# Patient Record
Sex: MALE | Race: WHITE | Employment: OTHER | URBAN - NONMETROPOLITAN AREA
[De-identification: names, ages, dates, MRNs, and addresses within clinical notes are randomized per-mention and may not be internally consistent; named-entity substitution may affect disease eponyms.]

---

## 2017-11-14 ENCOUNTER — ANCILLARY PROCEDURE (OUTPATIENT)
Dept: RADIOLOGY | Facility: CLINIC | Age: 67
End: 2017-11-14
Payer: COMMERCIAL

## 2017-11-14 PROCEDURE — 73552 X-RAY EXAM OF FEMUR 2/>: CPT | Mod: TC,LT | Performed by: PHYSICIAN ASSISTANT

## 2017-11-14 PROCEDURE — 73552 X-RAY EXAM OF FEMUR 2/>: CPT | Mod: 26,LT | Performed by: ORTHOPAEDIC SURGERY

## 2017-12-07 PROBLEM — S66.212D: Status: ACTIVE | Noted: 2017-12-07

## 2017-12-14 ENCOUNTER — APPOINTMENT (OUTPATIENT)
Dept: CARDIOLOGY | Facility: CLINIC | Age: 67
End: 2017-12-14
Payer: OTHER MISCELLANEOUS

## 2017-12-14 PROBLEM — I25.10 ATHEROSCLEROTIC HEART DISEASE OF NATIVE CORONARY ARTERY WITHOUT ANGINA PECTORIS: Status: ACTIVE | Noted: 2017-12-14

## 2017-12-14 PROCEDURE — 93000 ELECTROCARDIOGRAM COMPLETE: CPT | Performed by: FAMILY MEDICINE

## 2017-12-14 PROCEDURE — 85025 COMPLETE CBC W/AUTO DIFF WBC: CPT | Performed by: FAMILY MEDICINE

## 2017-12-14 PROCEDURE — 80053 COMPREHEN METABOLIC PANEL: CPT | Performed by: FAMILY MEDICINE

## 2017-12-26 ASSESSMENT — ACTIVITIES OF DAILY LIVING (ADL)
ASSISTIVE_DEVICES: EYEGLASSES;DENTURES PARTIAL
ADEQUATE_TO_COMPLETE_ADL: YES

## 2017-12-26 NOTE — PRE-PROCEDURE INSTRUCTIONS
Pre-Surgery Instructions:   Medication Instructions   • aspirin 81 mg EC tablet Stop taking 1 week prior to surgery/procedure   • atorvastatin (LIPITOR) 20 mg tablet Take morning of surgery/procedure   • metoprolol succinate XL (TOPROL-XL) 25 mg 24 hr tablet Take morning of surgery/procedure   • ramipril (ALTACE) 2.5 mg capsule Take morning of surgery/procedure   • UBIDECARENONE (COENZYME Q10 ORAL) Stop taking 1 week prior to surgery/procedure     Pre-op instructions and teaching completed with patient. Patient friend Frederick will accompany patient day of surgery. Patient instructed to take Lipitor, metoptolol, and Altace DOS. Will hold NSAIDS, vitamins, herbals, and any other blood thinning products until after surgery and md instruction, last dose to be 12/28/17. Reviewed orthopedic protocol and Alexis wipe instructions with patient. Call by surgery on arrival and NPO time 2-3 business days prior to procedure. Instructed to contact surgeon office if any illness or infection occurs and with any additional questions or concerns. Patient verbalizes understanding.

## 2018-01-02 ENCOUNTER — ANESTHESIA EVENT (OUTPATIENT)
Dept: GASTROENTEROLOGY | Facility: HOSPITAL | Age: 68
End: 2018-01-02
Payer: COMMERCIAL

## 2018-01-02 ENCOUNTER — HOSPITAL ENCOUNTER (OUTPATIENT)
Dept: PHYSICAL THERAPY | Facility: HOSPITAL | Age: 68
Setting detail: THERAPIES SERIES
Discharge: HOME | End: 2018-01-02
Payer: COMMERCIAL

## 2018-01-02 DIAGNOSIS — Z48.89 ENCOUNTER FOR OTHER SPECIFIED SURGICAL AFTERCARE (CODE): Primary | ICD-10-CM

## 2018-01-02 PROCEDURE — 97110 THERAPEUTIC EXERCISES: CPT | Performed by: PHYSICAL THERAPIST

## 2018-01-02 PROCEDURE — 97116 GAIT TRAINING THERAPY: CPT | Performed by: PHYSICAL THERAPIST

## 2018-01-02 NOTE — ANESTHESIA PREPROCEDURE EVALUATION
"Pre-Procedure Assessment    Patient: Juan Corona, male, 67 y.o.    Ht Readings from Last 1 Encounters:   12/14/17 1.676 m (5' 6\")     Wt Readings from Last 1 Encounters:   12/14/17 80.1 kg (176 lb 8 oz)       Last Vitals  BP      Temp      Pulse     Resp      SpO2         Problem list reviewed and Medical history reviewed    History of anesthetic complications: PONV         Airway   Mallampati: II  TM distance: >3 FB  Neck ROM: full      Dental    (+) lower dentures and partials    Pulmonary     breath sounds clear to auscultation  Cardiovascular   Exercise tolerance: good (4-7 METS)  (+) hypertension well controlled, CAD, DVT    ECG reviewed  Rhythm: regular  Rate: normal  ROS comment: 12/14/17  . Sinus rhythm  . Abnormal R-wave progression, early transition  * Diffuse ST elevation    10/17 Stress Test  Negative, EF 60%    Mental Status/Neuro/Psych    Pt is alert.      (+) arthritis,     GI/Hepatic/Renal    (+) GERD poorly controlled,     Endo/Other    (+) history of blood transfusion,   Abdominal                    Hematology   Lab Results   Component Value Date/Time    WBC 5.5 12/14/2017 09:35 AM    RBC 5.14 12/14/2017 09:35 AM    MCV 85.6 12/14/2017 09:35 AM    HGB 14.8 12/14/2017 09:35 AM    HCT 44.0 12/14/2017 09:35 AM     12/14/2017 09:35 AM      Coagulation No results found for: PT, APTT, INR   General Chemistry   Lab Results   Component Value Date/Time    CALCIUM 9.7 12/14/2017 09:35 AM    BUN 17 12/14/2017 09:35 AM    CREATININE 0.6 (L) 12/14/2017 09:35 AM    GLUCOSE 97 12/14/2017 09:35 AM     12/14/2017 09:35 AM    K 4.4 12/14/2017 09:35 AM    CO2 28 12/14/2017 09:35 AM     12/14/2017 09:35 AM     Anesthesia Plan    ASA 3   NPO status reviewed: > 8 hours    General         Induction: intravenous   Airway Planning: LMA          Plan for postoperative opioid use.    Anesthetic plan and risks discussed with patient.  Use of blood products discussed with patient whom consented to blood " products.

## 2018-01-02 NOTE — INTERDISCIPLINARY/THERAPY
[facility] PT Daily Treatment Note      Patient Name: Juan Corona  Date of Service: 1/2/2018  Visit Number: Visit Number: 13      Subjective:   Oleg continues to c/o pain in the region of the L hip flexor during activities that require hip flexion AROM.    Pain:  Pain Assessment  Pain Assessment: 0-10  Pain Score: 4 (0/10 at rest, 4-6/10 with repetitive hp flexion)      Objective:  HIP PROM:  · Extension: Right = unable to achieve neutral position prior to Tx, able to reach neutral position after ther ex, Left = 5 deg    Treatment:  THER EX:  · Hip flexor stretch: pt lying supine with proximal 2/3 of Left thigh on table, and remainder of Left leg off table, and Right leg flexed with foot on table.  From here contract-relax stretching and prolonged low load stretching was applied to the Left hip flexor  · NK Table knee extension, 3 sets, each set weight and knee flexion ROM increased  GAIT TRAINING:  · Verbal and visual cueing to facilitate greater Left knee flexion, and inhibit excessive Left hip flexion/circumduction       Rehab Potential/Assessment/Plan:  Oleg continues to c/o anterior Left hip discomfort during activities involving hip flexion.  His lack of hip extension + lack of knee extension causes him to both compensate and over-use his Left hip flexor.  Today I modified his ther ex to improve hip flexor flexibility, knee flexion ROM, and overall gait quality.      Patient continues to benefit from skilled PT.    At next session: improve hip extension ROM/flexibility, improve knee flexion ROM, improve gait quality.             Thank you for allowing us to share in the care of this patient. Please be advised the order we received does not include a specified duration of treatment, therefore, this order will only be good for 30 days. If you have any questions, recommendations, or further concerns regarding this patient, please feel free to contact me at [phone].  Signed by: JOSSIE ORTIZ, PT   1/2/2018  10:25 AM

## 2018-01-05 ENCOUNTER — ANESTHESIA (OUTPATIENT)
Dept: GASTROENTEROLOGY | Facility: HOSPITAL | Age: 68
End: 2018-01-05
Payer: COMMERCIAL

## 2018-01-05 ENCOUNTER — HOSPITAL ENCOUNTER (OUTPATIENT)
Facility: HOSPITAL | Age: 68
Setting detail: OUTPATIENT SURGERY
Discharge: HOME | End: 2018-01-05
Attending: ORTHOPAEDIC SURGERY | Admitting: ORTHOPAEDIC SURGERY
Payer: COMMERCIAL

## 2018-01-05 VITALS
SYSTOLIC BLOOD PRESSURE: 123 MMHG | BODY MASS INDEX: 28.28 KG/M2 | RESPIRATION RATE: 14 BRPM | OXYGEN SATURATION: 93 % | HEIGHT: 66 IN | HEART RATE: 74 BPM | DIASTOLIC BLOOD PRESSURE: 78 MMHG | TEMPERATURE: 98 F | WEIGHT: 176 LBS

## 2018-01-05 DIAGNOSIS — Z48.89 ENCOUNTER FOR POSTOPERATIVE CARE: Primary | ICD-10-CM

## 2018-01-05 PROCEDURE — (BLANK) HC RECOVERY PHASE-2 1ST 1/2 HOUR ACUITY LEVEL 1: Performed by: ORTHOPAEDIC SURGERY

## 2018-01-05 PROCEDURE — 6360000200 HC RX 636 W HCPCS (ALT 250 FOR IP): Performed by: NURSE ANESTHETIST, CERTIFIED REGISTERED

## 2018-01-05 PROCEDURE — (BLANK) HC RECOVERY PHASE-2 EACH ADDITIONAL 1/2 HOUR ACUITY LEVEL 1: Performed by: ORTHOPAEDIC SURGERY

## 2018-01-05 PROCEDURE — 6360000200 HC RX 636 W HCPCS (ALT 250 FOR IP): Performed by: NURSE PRACTITIONER

## 2018-01-05 PROCEDURE — 6360000200 HC RX 636 W HCPCS (ALT 250 FOR IP)

## 2018-01-05 PROCEDURE — 2500000500 HC SEVOFLURANE (15 MINS): Performed by: ORTHOPAEDIC SURGERY

## 2018-01-05 PROCEDURE — (BLANK) HC RECOVERY PHASE-1 1ST  HOUR ACUITY LEVEL 2: Performed by: ORTHOPAEDIC SURGERY

## 2018-01-05 PROCEDURE — 01810 ANES PX NRV MUSC F/ARM WRST: CPT | Performed by: NURSE ANESTHETIST, CERTIFIED REGISTERED

## 2018-01-05 PROCEDURE — 26412 REPAIR/GRAFT HAND TENDON: CPT | Mod: FA | Performed by: ORTHOPAEDIC SURGERY

## 2018-01-05 PROCEDURE — 6370000100 HC RX 637 (ALT 250 FOR IP): Performed by: PHYSICIAN ASSISTANT

## 2018-01-05 PROCEDURE — 26412 REPAIR/GRAFT HAND TENDON: CPT | Mod: AS,FA | Performed by: PHYSICIAN ASSISTANT

## 2018-01-05 PROCEDURE — 6370000100 HC RX 637 (ALT 250 FOR IP): Performed by: NURSE ANESTHETIST, CERTIFIED REGISTERED

## 2018-01-05 PROCEDURE — (BLANK) HC OR LEVEL 2 PROC EACH ADDITIONAL MIN: Performed by: ORTHOPAEDIC SURGERY

## 2018-01-05 PROCEDURE — 2580000300 HC RX 258: Performed by: NURSE PRACTITIONER

## 2018-01-05 PROCEDURE — (BLANK) HC OR LEVEL 2 PROC 1ST 15MIN: Performed by: ORTHOPAEDIC SURGERY

## 2018-01-05 PROCEDURE — 2500000200 HC RX 250 WO HCPCS: Performed by: ORTHOPAEDIC SURGERY

## 2018-01-05 RX ORDER — DIPHENHYDRAMINE HCL 25 MG
25 CAPSULE ORAL EVERY 4 HOURS PRN
Status: DISCONTINUED | OUTPATIENT
End: 2018-01-05 | Stop reason: HOSPADM

## 2018-01-05 RX ORDER — MORPHINE SULFATE 2 MG/ML
1-2 INJECTION, SOLUTION INTRAMUSCULAR; INTRAVENOUS
Status: DISCONTINUED | OUTPATIENT
Start: 2018-01-05 | End: 2018-01-05 | Stop reason: HOSPADM

## 2018-01-05 RX ORDER — MIDAZOLAM HYDROCHLORIDE 1 MG/ML
INJECTION, SOLUTION INTRAMUSCULAR; INTRAVENOUS AS NEEDED
Status: DISCONTINUED | OUTPATIENT
Start: 2018-01-05 | End: 2018-01-05 | Stop reason: SURG

## 2018-01-05 RX ORDER — FENTANYL CITRATE/PF 100MCG/2ML
SYRINGE (ML) INTRAVENOUS AS NEEDED
Status: DISCONTINUED | OUTPATIENT
Start: 2018-01-05 | End: 2018-01-05 | Stop reason: SURG

## 2018-01-05 RX ORDER — LIDOCAINE HYDROCHLORIDE 20 MG/ML
INJECTION, SOLUTION EPIDURAL; INFILTRATION; INTRACAUDAL; PERINEURAL AS NEEDED
Status: DISCONTINUED | OUTPATIENT
Start: 2018-01-05 | End: 2018-01-05 | Stop reason: SURG

## 2018-01-05 RX ORDER — BUPIVACAINE HYDROCHLORIDE 2.5 MG/ML
INJECTION, SOLUTION EPIDURAL; INFILTRATION; INTRACAUDAL AS NEEDED
Status: DISCONTINUED | OUTPATIENT
Start: 2018-01-05 | End: 2018-01-05 | Stop reason: HOSPADM

## 2018-01-05 RX ORDER — OXYCODONE AND ACETAMINOPHEN 5; 325 MG/1; MG/1
2 TABLET ORAL EVERY 4 HOURS PRN
Status: DISCONTINUED | OUTPATIENT
Start: 2018-01-05 | End: 2018-01-05 | Stop reason: HOSPADM

## 2018-01-05 RX ORDER — SODIUM CHLORIDE, SODIUM LACTATE, POTASSIUM CHLORIDE, CALCIUM CHLORIDE 600; 310; 30; 20 MG/100ML; MG/100ML; MG/100ML; MG/100ML
100 INJECTION, SOLUTION INTRAVENOUS CONTINUOUS
Status: DISCONTINUED | OUTPATIENT
Start: 2018-01-05 | End: 2018-01-05 | Stop reason: HOSPADM

## 2018-01-05 RX ORDER — FENTANYL CITRATE 50 UG/ML
50 INJECTION, SOLUTION INTRAMUSCULAR; INTRAVENOUS EVERY 5 MIN PRN
Status: CANCELLED | OUTPATIENT
Start: 2018-01-05

## 2018-01-05 RX ORDER — DIPHENHYDRAMINE HYDROCHLORIDE 50 MG/ML
25 INJECTION INTRAMUSCULAR; INTRAVENOUS ONCE AS NEEDED
Status: CANCELLED | OUTPATIENT
Start: 2018-01-05

## 2018-01-05 RX ORDER — ONDANSETRON 4 MG/1
4 TABLET, FILM COATED ORAL EVERY 6 HOURS PRN
Status: DISCONTINUED | OUTPATIENT
End: 2018-01-05 | Stop reason: HOSPADM

## 2018-01-05 RX ORDER — DEXAMETHASONE SODIUM PHOSPHATE 4 MG/ML
INJECTION, SOLUTION INTRA-ARTICULAR; INTRALESIONAL; INTRAMUSCULAR; INTRAVENOUS; SOFT TISSUE AS NEEDED
Status: DISCONTINUED | OUTPATIENT
Start: 2018-01-05 | End: 2018-01-05 | Stop reason: SURG

## 2018-01-05 RX ORDER — ONDANSETRON 4 MG/1
4 TABLET, ORALLY DISINTEGRATING ORAL EVERY 6 HOURS PRN
Status: DISCONTINUED | OUTPATIENT
End: 2018-01-05 | Stop reason: HOSPADM

## 2018-01-05 RX ORDER — ONDANSETRON HYDROCHLORIDE 2 MG/ML
INJECTION, SOLUTION INTRAVENOUS AS NEEDED
Status: DISCONTINUED | OUTPATIENT
Start: 2018-01-05 | End: 2018-01-05 | Stop reason: SURG

## 2018-01-05 RX ORDER — ONDANSETRON HYDROCHLORIDE 2 MG/ML
4 INJECTION, SOLUTION INTRAVENOUS EVERY 6 HOURS PRN
Status: DISCONTINUED | OUTPATIENT
End: 2018-01-05 | Stop reason: HOSPADM

## 2018-01-05 RX ORDER — ONDANSETRON HYDROCHLORIDE 2 MG/ML
4 INJECTION, SOLUTION INTRAVENOUS ONCE AS NEEDED
Status: CANCELLED | OUTPATIENT
Start: 2018-01-05

## 2018-01-05 RX ORDER — DIPHENHYDRAMINE HYDROCHLORIDE 50 MG/ML
25 INJECTION INTRAMUSCULAR; INTRAVENOUS EVERY 4 HOURS PRN
Status: DISCONTINUED | OUTPATIENT
End: 2018-01-05 | Stop reason: HOSPADM

## 2018-01-05 RX ORDER — HYDROMORPHONE HYDROCHLORIDE 1 MG/ML
0.5 INJECTION, SOLUTION INTRAMUSCULAR; INTRAVENOUS; SUBCUTANEOUS EVERY 5 MIN PRN
Status: CANCELLED | OUTPATIENT
Start: 2018-01-05

## 2018-01-05 RX ORDER — PROPOFOL 10 MG/ML
INJECTION, EMULSION INTRAVENOUS AS NEEDED
Status: DISCONTINUED | OUTPATIENT
Start: 2018-01-05 | End: 2018-01-05 | Stop reason: SURG

## 2018-01-05 RX ORDER — OXYCODONE AND ACETAMINOPHEN 5; 325 MG/1; MG/1
1-2 TABLET ORAL EVERY 4 HOURS PRN
Status: DISCONTINUED | OUTPATIENT
Start: 2018-01-05 | End: 2018-01-05 | Stop reason: HOSPADM

## 2018-01-05 RX ORDER — IPRATROPIUM BROMIDE AND ALBUTEROL SULFATE 2.5; .5 MG/3ML; MG/3ML
3 SOLUTION RESPIRATORY (INHALATION) ONCE AS NEEDED
Status: CANCELLED | OUTPATIENT
Start: 2018-01-05

## 2018-01-05 RX ORDER — LABETALOL HYDROCHLORIDE 5 MG/ML
5 INJECTION, SOLUTION INTRAVENOUS EVERY 5 MIN PRN
Status: CANCELLED | OUTPATIENT
Start: 2018-01-05

## 2018-01-05 RX ORDER — PROMETHAZINE HYDROCHLORIDE 25 MG/ML
12.5 INJECTION, SOLUTION INTRAMUSCULAR; INTRAVENOUS ONCE AS NEEDED
Status: CANCELLED | OUTPATIENT
Start: 2018-01-05

## 2018-01-05 RX ORDER — OXYCODONE AND ACETAMINOPHEN 5; 325 MG/1; MG/1
TABLET ORAL
Qty: 40 TABLET | Refills: 0 | Status: SHIPPED | OUTPATIENT
Start: 2018-01-05 | End: 2018-02-20 | Stop reason: WASHOUT

## 2018-01-05 RX ORDER — CEFAZOLIN SODIUM 10 G/1
1000 INJECTION, POWDER, FOR SOLUTION INTRAVENOUS ONCE
Status: COMPLETED | OUTPATIENT
Start: 2018-01-05 | End: 2018-01-05

## 2018-01-05 RX ORDER — DEXAMETHASONE SODIUM PHOSPHATE 4 MG/ML
4 INJECTION, SOLUTION INTRA-ARTICULAR; INTRALESIONAL; INTRAMUSCULAR; INTRAVENOUS; SOFT TISSUE ONCE AS NEEDED
Status: CANCELLED | OUTPATIENT
Start: 2018-01-05

## 2018-01-05 RX ORDER — MIDAZOLAM HYDROCHLORIDE 1 MG/ML
1 INJECTION, SOLUTION INTRAMUSCULAR; INTRAVENOUS EVERY 5 MIN PRN
Status: CANCELLED | OUTPATIENT
Start: 2018-01-05

## 2018-01-05 RX ORDER — SCOPOLAMINE 1 MG/3D
1 PATCH, EXTENDED RELEASE TRANSDERMAL
Status: DISCONTINUED | OUTPATIENT
Start: 2018-01-05 | End: 2018-01-05 | Stop reason: HOSPADM

## 2018-01-05 RX ORDER — SODIUM CHLORIDE, SODIUM LACTATE, POTASSIUM CHLORIDE, AND CALCIUM CHLORIDE .6; .31; .03; .02 G/100ML; G/100ML; G/100ML; G/100ML
500 INJECTION, SOLUTION INTRAVENOUS ONCE AS NEEDED
Status: CANCELLED | OUTPATIENT
Start: 2018-01-05

## 2018-01-05 RX ORDER — NALOXONE HYDROCHLORIDE 0.4 MG/ML
0.2 INJECTION, SOLUTION INTRAMUSCULAR; INTRAVENOUS; SUBCUTANEOUS AS NEEDED
Status: CANCELLED | OUTPATIENT
Start: 2018-01-05

## 2018-01-05 RX ORDER — ONDANSETRON 4 MG/1
4 TABLET, FILM COATED ORAL 4 TIMES DAILY PRN
Qty: 20 TABLET | Refills: 1 | Status: SHIPPED | OUTPATIENT
Start: 2018-01-05 | End: 2018-01-12

## 2018-01-05 RX ORDER — ACETAMINOPHEN 325 MG/1
325-650 TABLET ORAL EVERY 4 HOURS PRN
Status: DISCONTINUED | OUTPATIENT
Start: 2018-01-05 | End: 2018-01-05 | Stop reason: HOSPADM

## 2018-01-05 RX ADMIN — LIDOCAINE HYDROCHLORIDE 50 MG: 20 INJECTION, SOLUTION EPIDURAL; INFILTRATION; INTRACAUDAL; PERINEURAL at 07:27

## 2018-01-05 RX ADMIN — OXYCODONE HYDROCHLORIDE AND ACETAMINOPHEN 1 TABLET: 5; 325 TABLET ORAL at 09:45

## 2018-01-05 RX ADMIN — SODIUM CHLORIDE, POTASSIUM CHLORIDE, SODIUM LACTATE AND CALCIUM CHLORIDE: 600; 310; 30; 20 INJECTION, SOLUTION INTRAVENOUS at 08:25

## 2018-01-05 RX ADMIN — FENTANYL CITRATE 50 MCG: 50 INJECTION, SOLUTION INTRAMUSCULAR; INTRAVENOUS at 07:27

## 2018-01-05 RX ADMIN — DEXAMETHASONE SODIUM PHOSPHATE 8 MG: 4 INJECTION, SOLUTION INTRAMUSCULAR; INTRAVENOUS at 07:30

## 2018-01-05 RX ADMIN — SODIUM CHLORIDE, POTASSIUM CHLORIDE, SODIUM LACTATE AND CALCIUM CHLORIDE 100 ML/HR: 600; 310; 30; 20 INJECTION, SOLUTION INTRAVENOUS at 07:09

## 2018-01-05 RX ADMIN — FENTANYL CITRATE 50 MCG: 50 INJECTION, SOLUTION INTRAMUSCULAR; INTRAVENOUS at 07:47

## 2018-01-05 RX ADMIN — ONDANSETRON 4 MG: 2 INJECTION INTRAMUSCULAR; INTRAVENOUS at 07:18

## 2018-01-05 RX ADMIN — MIDAZOLAM HYDROCHLORIDE 2 MG: 1 INJECTION, SOLUTION INTRAMUSCULAR; INTRAVENOUS at 07:27

## 2018-01-05 RX ADMIN — SCOPALAMINE 1 PATCH: 1 PATCH, EXTENDED RELEASE TRANSDERMAL at 06:59

## 2018-01-05 RX ADMIN — CEFAZOLIN 1 G: 10 INJECTION, POWDER, FOR SOLUTION INTRAVENOUS at 07:30

## 2018-01-05 RX ADMIN — PROPOFOL 200 MG: 10 INJECTION, EMULSION INTRAVENOUS at 07:27

## 2018-01-05 ASSESSMENT — ENCOUNTER SYMPTOMS: EXERCISE TOLERANCE: GOOD (4-7 METS)

## 2018-01-05 NOTE — ANESTHESIA PROCEDURE NOTES
Airway  Date/Time: 1/5/2018 7:28 AM  Urgency: elective    Airway not difficult    General Information and Staff    Patient location during procedure: OR  CRNA: HUSSEIN PELAYO  Other anesthesia staff: KATTY VAUGHN  Performed: CRNA and other anesthesia staff     Indications and Patient Condition  Indications for airway management: anesthesia  Spontaneous Ventilation: absent  Sedation level: deep  Preoxygenated: yes  MILS maintained throughout  Mask difficulty assessment: 0 - not attempted    Final Airway Details  Final airway type: supraglottic airway      Successful airway: unique  Size 4    Placement verified by: chest auscultation, capnometry and palpation of cuff   Number of attempts at approach: 1

## 2018-01-05 NOTE — OP NOTE
DATE OF SERVICE:  January 5, 2018    PREOPERATIVE DIAGNOSIS:  Left thumb extensor pollicis longus rupture, traumatic.    POSTOPERATIVE DIAGNOSIS:  Left thumb extensor pollicis longus rupture, traumatic.    PROCEDURES PERFORMED:  Left thumb tendon transfer of extensor indices to extensor pollicis longus.    SURGEON:  Nabil Wolfe DO    ASSISTANT:  SHAUNNA Lindo provided essential assistance during the case including some  or all of the following critical tasks:  preparation of graft materials, careful  retractions of vital structures, safe manipulation of anatomic structures,  suctioning of body fluids, and wound closure.      COMPLICATIONS:  None.    ESTIMATED BLOOD LOSS:  Minimal.    ANESTHESIA:  General.    INDICATIONS FOR PROCEDURE:  Mr. Corona is a pleasant 67-year-old gentleman who is well known to me.  He  sustained a significant work-related injury, which he had a distal radius  fracture, which he underwent open reduction internal fixation.  Unfortunately,  sometime after his fracture, he was using his thumb.  He felt instant pain and a  pop, and after that time, he had inability to extend his thumb and use it for  his daily activities.  He was seen and evaluated and diagnosed with the above.   We discussed operative intervention, risks, benefits and alternatives.  No  guarantees were given and we proceeded as he wished.    PROCEDURE DETAILS:  Mr. Corona was visited in the preoperative holding area by myself.  All of his  questions were answered.  Again, no change from previous visits.  He was taken  back to the operating suite, placed in the supine position, and anesthetized  without difficulty.  His left upper extremity was prepped and draped in the  usual sterile manner.  An upper arm tourniquet was placed and insufflated when  appropriate.  I made 3 separate incisions over the hand and wrist.  The first  incision was over the extensor pollicis longus tendon.  Careful dissection was  taken down,  and it was freed up and found to have some significant laxity.  I  then made an incision over Andrew tubercle.  Careful dissection was taken down  to the extensor tendons.  The extensor pollicis longus tendon was then freed up  and verified have obvious rupture.  I then located the extensor indices and  extensor common tendons to the first digit.  Careful evaluation and localization  of that tendon was performed.  I made an incision over the index MCP joint.   Careful dissection was taken down verified the tendon.  I resected the extensor  indices tendon at the level of the MCP joint and retracted that out of the  dorsal wrist incision.  I then used a tendon passer to pass it to the primary  first incision.  I then used the pulvertaft weave technique at 90-degree  angles about 5 passes and sutured down with 4-0 Vicryl, several mattress-type  stitches and tenodesis stitches at the densities tendon.  The remaining tendons  were resected.  I placed him at maximum tension to the thumb.  He had good  excursion of the wrist and movement of the thumb with the wrist extension and  flexion.  I feel he had appropriate tension.  The incisions were then washed and  closed in the usual and sterile manner.  He was placed into a thumb spica splint  appropriately.  We will see him back in the office in 2 weeks' time.  At 2  weeks, we will take amount of the thumb Spika splint.  He will transition with  physical therapy to a removable thumb splint where he could do passive range of  motion to the thumb.

## 2018-01-05 NOTE — PERIOPERATIVE NURSING NOTE
Patient's friend that is taking him home is in the lobby waiting and didn't want to come back to pre-op bay when asked. Friend would still like to talk to Dr. Wolfe after surgery.

## 2018-01-05 NOTE — PERIOPERATIVE NURSING NOTE
PATIENT VSS THROUGHOUT RECOVERY. OXYGEN SATURATION STAYED 92% OR ABOVE ON RA.  PATIENT TOLERATED FOOD AND DRINK WELL. DENIED NAUSEA. PATIENT WAS RATING PAIN AS A 5. TOOK ONE PAIN PILL YET PATIENT STATED HIS PAIN WAS TOLERABLE. PATIENT COMMUNICATED WITH STAFF AND RESTED COMFORTABLY THROUGHOUT.    DISCHARGE INSTRUCTIONS WERE GIVEN TO PATIENT AND FRIEND. BOTH VERBALIZED UNDERSTANDING AND HAD NO FURTHER QUESTIONS.

## 2018-01-05 NOTE — ANESTHESIA POSTPROCEDURE EVALUATION
Patient: Juan Corona    Procedure Summary     Date:  01/05/18 Room / Location:  [facility] OR 01 / [facility] OR    Anesthesia Start:  0722 Anesthesia Stop:  0905    Procedure:  TENDON TRANSFER OF EXTENSOR INDICIS TO EXTENSOR POLLICIS LONGUS OF LEFT THUMB (Left ) Diagnosis:       Strain of extensor muscle, fascia and tendon of left thumb at wrist and hand level, subsequent encounter      (Strain of extensor muscle, fascia and tendon of left thumb at wrist and hand level, subsequent encounter [R22.492C])    Surgeon:  Nabil Wolfe DO Responsible Provider:  Rosalba Yeung CRNA    Anesthesia Type:  general ASA Status:  3          Anesthesia Type: general  Last vitals  /79 (01/05/18 0902)    Temp 36.8 °C (98.3 °F) (01/05/18 0902)    Pulse     Resp      SpO2      Pain Score        Anesthesia Post Evaluation    Patient location during evaluation: PACU  Patient participation: complete - patient participated  Level of consciousness: awake and alert  Pain management: adequate  Airway patency: patent  Anesthetic complications: no  Cardiovascular status: acceptable  Respiratory status: acceptable  Hydration status: acceptable  May dismiss recovered patient based on consultation with the appropriate physicians and/or meeting appropriate discharge criteria

## 2018-01-05 NOTE — DISCHARGE INSTRUCTIONS
Specific Instructions from your Doctor:  -Keep splint on the left hand at all times until your 1st post-op visit.   -No lifting, pushing, pulling, or grasping of more than 3 lbs with the left hand  -Work on range of motion of your fingers on the left hand 6x daily for 10 minutes to prevent swelling and stiffness      General Anesthesia Post-Operative Instructions    Due to surgery, you may feel tired or lightheaded and your judgment and motor abilities are going to be impaired for the next 24 hours. For your safety, please follow these instructions:  · DO NOT drive or operate heavy machinery for the next 24 hours.  · DO NOT use alcohol or sleeping pills for the next 24 hours.  · DO NOT make any critical decisions or sign any legal documents for the next 24 hours.  · A responsible adult needs to stay with you for the next 24 hours. Do not stay home alone.  · Limit your activity for the next 24 hours. You may return to your normal activities when your doctor gives you permission.     Anesthesia may cause nausea and vomiting in some individuals. To help prevent this, follow these tips:  · Start with water, clear liquids, and light foods, such as toast, crackers, or jello.  · If you are tolerating light foods without any problems, you may gradually increase diet as desired.  · If nausea or vomiting persists, call your doctor or come into the ER after clinic hours.    To help prevent blood clots, move your feet in a [place] or up and down 10-30 times an hour while sitting or lying down.    Call your doctor if you experience:  · A fever over a 100.4 F.   · If you notice pus or red streaks coming from your wound.  · If you notice increased redness or swelling around the wound.  · If you notice increasing bleeding from your wound.       Medications    You were given:    You may have more:      If you have any problems or questions you may call:    [facility]:  [phone]  [facility]:   [phone]  [facility]:  [phone]  [facility]:  [phone]  [facility]:  [phone]    Post Operative Instructions for Tendon Transfer Thumb    Wound Care  · Your surgical site is covered with a splint or cast. The splint or cast needs to remain clean, dry, and intact until your post operative appointment with your doctor.  · When taking a shower, you will need to wrap the cast or splint in a large plastic bag and then securely tape it in place so water will not get into the bag.   · If your cast or splint gets a little wet or damp, you may use a hair dryer on a low setting and sweep the hair dryer back and forth across the cast or splint to help it dry. If your cast or splint gets really wet, call your doctor.  · The skin that is inside the cast or splint may start to itch. DO NOT stick anything inside your cast or splint as it may cause the stitches to break or loosen up causing bleeding. You might also scratch your skin which could become infected and may spread to the surgical site.    Medications  · It is common to encounter more pain on the first or second day after surgery due to swelling.  · Using pain medication as directed will help to control the pain.  · It is important not to drink alcohol or drive while taking narcotic medications, such as Norco (hydrocodone) or Percocet (oxycodone).  · If you are taking Tylenol, don't take narcotics, such as Norco or Percocet, as these medications contain Tylenol.  · Pain medications can cause constipation. To help prevent constipation, you may take an over the counter stool softener or try a high fiber diet with lots of fluids.     Activity  · Elevation of the affected body part is very important after surgery to prevent fluid from pooling, which can cause swelling and pain.  · You may also apply ice to the affected area over the splint or cast. Apply the ice for 20-40 minutes and then  remove the ice for 20-40 minutes several times a day.   · To help keep blood circulating and prevent blood clots, wiggle your fingers or toes on the affected limb 10 times an hour. If you are able, you may pump your fingers into a fist 10 times an hour.  · If you notice that the cast or splint is getting very tight and painful, immediately elevate the affected body part and place ice on as well. If it doesn't get better within 15-20 minutes despite ice and elevation, call your doctor.  · If you notice that the fingers on the affected side become pale and cold, call your doctor right away.     Preventing Blood Clots  · Walking around will help the blood circulate and prevent blood clots.  · While sitting or lying down, move your feet in a [place] or pump your feet up and down 10-30 times an hour.  · If you notice redness, tenderness, or swelling in your calf muscles, call your doctor right away.  · If you are NOT taking a daily blood thinner, you may take a baby 81mg Aspirin.  · If you ARE taking a daily blood thinner, consult your doctor.     Call your Doctor if:  · You experience a fever over 100.4 F.  · You notice drainage from your incisions, or red streaks around your wound.  · Increased bleeding from the incisions.      Scopalamine Patch:  · Must be removed within 3 days post-op. May be removed sooner.  · Wear gloves or wash hands well with soap and water after removing patch.  · Dispose patch away from children/pets.

## 2018-01-05 NOTE — H&P (VIEW-ONLY)
Subjective      Chief Complaint   Patient presents with   • Pre-op Exam     pre-op visit for left thumb surgery with Dr. Wolfe on 1/5/17 at [facility].       Juan Corona is a 67 y.o. male and is here for a pre-operative risk assessment. Pre-op Exam (pre-op visit for left thumb surgery with Dr. Wolfe on 1/5/17 at [facility].)    Pt denies any specific concerns or complaints. Pt denies dyspnea on exertion, chest pain, or palpitations.  No hx of MI, CVA, or DVT.  Pt does have a hx of stent x 1 five years ago after having some episodes of angina.    Review of Systems   Constitutional: Negative for appetite change, chills, diaphoresis, fatigue, fever and unexpected weight change.   HENT: Positive for sore throat. Negative for congestion, dental problem, ear discharge, ear pain, hearing loss, mouth sores, nosebleeds, rhinorrhea, sinus pressure, tinnitus, trouble swallowing and voice change.    Eyes: Positive for visual disturbance. Negative for pain, discharge and redness.   Respiratory: Negative for cough, chest tightness, shortness of breath and wheezing.    Cardiovascular: Negative for chest pain, palpitations and leg swelling.   Gastrointestinal: Negative for abdominal pain, blood in stool, constipation, diarrhea, nausea and vomiting.        Heartburn   Endocrine: Negative for cold intolerance, heat intolerance, polydipsia and polyuria.   Genitourinary: Negative for difficulty urinating, dysuria, frequency and hematuria.   Musculoskeletal: Positive for joint swelling. Negative for arthralgias, back pain and myalgias.   Skin: Negative for rash and wound.   Allergic/Immunologic: Negative for environmental allergies.   Neurological: Negative for dizziness, weakness, numbness and headaches.   Hematological: Negative for adenopathy. Does not bruise/bleed easily.   Psychiatric/Behavioral: Negative for behavioral problems, dysphoric mood and sleep disturbance. The patient is not nervous/anxious.        The following portions of the  "patient's history were reviewed and updated as appropriate: allergies, current medications, past family history, past medical history, past social history, past surgical history and problem list.    Objective   /68 (BP Location: Left arm, Patient Position: Sitting, Cuff Size: Reg)   Pulse 68   Temp 37 °C (98.6 °F) (Temporal)   Resp 16   Ht 1.676 m (5' 6\")   Wt 80.1 kg (176 lb 8 oz)   SpO2 97%   BMI 28.49 kg/m²     Physical Exam   Constitutional: He is oriented to person, place, and time. He appears well-developed and well-nourished. No distress.   HENT:   Head: Normocephalic and atraumatic.   Right Ear: External ear normal.   Left Ear: External ear normal.   Nose: Nose normal.   Mouth/Throat: Oropharynx is clear and moist. No oropharyngeal exudate.   Eyes: Conjunctivae and EOM are normal. Pupils are equal, round, and reactive to light. Right eye exhibits no discharge. Left eye exhibits no discharge. No scleral icterus.   Neck: Neck supple. No tracheal deviation present. No thyromegaly present.   Cardiovascular: Normal rate, regular rhythm, normal heart sounds and intact distal pulses.  Exam reveals no gallop and no friction rub.    No murmur heard.  Pulmonary/Chest: Effort normal and breath sounds normal. No respiratory distress. He has no wheezes. He has no rales.   Abdominal: Soft. Bowel sounds are normal. He exhibits no distension and no mass. There is no tenderness.   Musculoskeletal: Normal range of motion. He exhibits no edema, tenderness or deformity.   Lymphadenopathy:     He has no cervical adenopathy.   Neurological: He is alert and oriented to person, place, and time. He has normal reflexes. He displays normal reflexes. No cranial nerve deficit.   Skin: Skin is warm and dry. No rash noted. He is not diaphoretic. No erythema. No pallor.   Psychiatric: He has a normal mood and affect. His behavior is normal. Judgment and thought content normal.   Nursing note and vitals reviewed.       Results " for orders placed or performed in visit on 12/14/17   CBC w/auto differential Blood, Venous   Result Value Ref Range    WBC 5.5 3.7 - 9.6 10*3/uL    RBC 5.14 4.10 - 5.80 10*6/µL    Hemoglobin 14.8 13.2 - 17.2 g/dL    Hematocrit 44.0 38.0 - 50.0 %    MCV 85.6 82.0 - 97.0 fL    MCH 28.9 (L) 29.0 - 34.0 pg    MCHC 33.7 32.0 - 36.0 g/dL    RDW 14.8 11.5 - 15.0 %    Platelets 132 130 - 350 10*3/uL    MPV 11.1 (H) 6.9 - 10.8 fL    Neutrophils% 58 46 - 70 %    Lymphocytes% 30 15 - 47 %    Monocytes% 10 5 - 13 %    Eosinophils% 2 0 - 3 %    Basophils% 1 0 - 2 %    Neutrophils Absolute 3.20 10*3/uL    Lymphocytes Absolute 1.70 10*3/uL    Monocytes Absolute 0.50 10*3/uL    Eosinophils Absolute 0.10 10*3/uL    Basophils Absolute 0.00 10*3/uL        Assessment/Plan          Diagnoses and all orders for this visit:    Preop examination  -     ECG 12 lead    Atherosclerosis of native coronary artery of native heart without angina pectoris  -     Comprehensive metabolic panel Blood, Venous  -     CBC w/auto differential Blood, Venous          1. Revised Cardiac Risk Index: 1 (0.9% risk)     2 . Post Op Respiratory Failure Risk: 0.26%    3. [facility] Activity Status Index: >6.6 METS    4. EKG: Sinus rhythm, abnormal R wave progression, diffuse ST elevation; abnormal EKG.     5. CBC/CMP: Normal    6. No further risk stratification needed prior to surgery      Wisam Seymour MD

## 2018-01-08 ENCOUNTER — HOSPITAL ENCOUNTER (OUTPATIENT)
Dept: PHYSICAL THERAPY | Facility: HOSPITAL | Age: 68
Setting detail: THERAPIES SERIES
Discharge: HOME | End: 2018-01-08
Payer: COMMERCIAL

## 2018-01-08 DIAGNOSIS — Z48.89 ENCOUNTER FOR OTHER SPECIFIED SURGICAL AFTERCARE (CODE): Primary | ICD-10-CM

## 2018-01-08 PROCEDURE — 97110 THERAPEUTIC EXERCISES: CPT | Mod: GP | Performed by: PHYSICAL THERAPIST

## 2018-01-08 NOTE — INTERDISCIPLINARY/THERAPY
[facility] PT Daily Treatment Note      Patient Name: Juan Corona  Date of Service: 1/8/2018  Visit Number: Visit Number: 14      Subjective:   Oleg states the pain in his Left hip is nearly resolved.  He reports low level pain in the Left thumb following recent surgery for a EHL tendon transfer.      Pain:  Pain Assessment  Pain Assessment: 0-10  Pain Score: 2  Pain Type: Surgical pain  Pain Location: Other (Comment) (Left thumb)  Pain Orientation: Left       Objective:  HIP PROM:  · Extension: Right = unable to achieve neutral position prior to Tx, able to reach neutral position after ther ex, Left = 5 deg  KNEE PROM:  · Seated with PT PROM : Left = 102 deg flexion  · Total Gym PROM: Left= 100 deg    Treatment:  THER EX:  · Hip flexor stretch: pt lying supine with proximal 2/3 of Left thigh on table, and remainder of Left leg off table, and Right leg flexed with foot on table.  From here contract-relax stretching and prolonged low load stretching was applied to the Left hip flexor  · Prone knee flexion: x 20 each  · Prone hip extension: 2 x 10 each  · SABA x 2 min  NK Table knee extension, 3 sets, 10 reps, 7.5 pounds    GAIT TRAINING:  · Verbal and visual cueing to facilitate greater Left knee flexion, and inhibit excessive Left hip flexion/circumduction       Rehab Potential/Assessment/Plan:  Oleg has responded very well to a combination of ther ex and activity avoidance to resolve Left hip flexor strain.  He continues to be mildly symptomatic, but the strain is resolving.  Today I taught him a simple stretch of the hip flexor in standing which he performed and tolerated well.  He has also recovered his knee flexion PROM which helps with his gait pattern.      Patient continues to benefit from skilled PT through 1/22/18.  Will complete PN on 1/22/18.      At next session: Resume quad, hamstring, and glut strengthening.  Give Oleg updated schedule.       Thank you for allowing us to share in the care of this  patient. Please be advised the order we received does not include a specified duration of treatment, therefore, this order will only be good for 30 days. If you have any questions, recommendations, or further concerns regarding this patient, please feel free to contact me at [phone].  Signed by: JOSSIE ORTIZ, PT  1/8/2018  8:34 AM

## 2018-01-12 ENCOUNTER — HOSPITAL ENCOUNTER (OUTPATIENT)
Dept: PHYSICAL THERAPY | Facility: HOSPITAL | Age: 68
Setting detail: THERAPIES SERIES
Discharge: HOME | End: 2018-01-12
Payer: COMMERCIAL

## 2018-01-12 DIAGNOSIS — Z48.89 ENCOUNTER FOR OTHER SPECIFIED SURGICAL AFTERCARE (CODE): Primary | ICD-10-CM

## 2018-01-12 PROCEDURE — 97110 THERAPEUTIC EXERCISES: CPT | Mod: GP | Performed by: PHYSICAL THERAPIST

## 2018-01-12 NOTE — INTERDISCIPLINARY/THERAPY
[facility] PT Daily Treatment Note      Patient Name: Juan Corona  Date of Service: 1/12/2018  Visit Number: Visit Number: 15      Subjective:   Oleg states his left hip continues to feel better.       Pain:  Pain Assessment  Pain Assessment: No/denies pain       Objective:  HIP PROM:  · Extension: Right = unable to achieve neutral position prior to Tx, able to reach neutral position after ther ex, Left = 5 deg  KNEE PROM:  · Seated with PT PROM : Left = 102 deg flexion  · Total Gym PROM: Left= 100 deg    Treatment:  THER EX:  · Hip flexor stretch: pt lying supine with proximal 2/3 of Left thigh on table, and remainder of Left leg off table, and Right leg flexed with foot on table.  From here contract-relax stretching and prolonged low load stretching was applied to the Left hip flexor  · Hip Abd in sidelying: 2 x 10  · Prone knee flexion: x 20 each  · Prone hip extension: 2 x 10 each  · SABA x 2 min  TOTAL GYM:  · 20 Reps, bilat LEs,   · L LE on, 2 x 10 reps  NK Table knee extension, 3 sets, 10 reps, 7.5 pounds    GAIT TRAINING:      Rehab Potential/Assessment/Plan:  I was able to resume Oleg's LE strengthening exercises today, as his Left hip flexor continues to feel better.       Patient continues to benefit from skilled PT through 1/22/18.  Will complete PN on 1/22/18.      At next session: Resume quad, hamstring, and glut strengthening.  Give Oleg updated schedule.       Thank you for allowing us to share in the care of this patient. Please be advised the order we received does not include a specified duration of treatment, therefore, this order will only be good for 30 days. If you have any questions, recommendations, or further concerns regarding this patient, please feel free to contact me at [phone].  Signed by: JOSSIE ORTIZ, PT  1/12/2018  8:32 AM

## 2018-01-15 ENCOUNTER — HOSPITAL ENCOUNTER (OUTPATIENT)
Dept: PHYSICAL THERAPY | Facility: HOSPITAL | Age: 68
Setting detail: THERAPIES SERIES
Discharge: HOME | End: 2018-01-15
Payer: COMMERCIAL

## 2018-01-15 DIAGNOSIS — Z48.89 ENCOUNTER FOR OTHER SPECIFIED SURGICAL AFTERCARE (CODE): Primary | ICD-10-CM

## 2018-01-15 PROCEDURE — 97110 THERAPEUTIC EXERCISES: CPT | Mod: GP | Performed by: PHYSICAL THERAPIST

## 2018-01-15 PROCEDURE — 97116 GAIT TRAINING THERAPY: CPT | Mod: GP | Performed by: PHYSICAL THERAPIST

## 2018-01-15 NOTE — INTERDISCIPLINARY/THERAPY
[facility] PT Daily Treatment Note      Patient Name: Juan Corona  Date of Service: 1/15/2018  Visit Number: Visit Number: 16      Subjective:   Oleg denies any discomfort at the moment.      Pain:  Pain Assessment  Pain Assessment: No/denies pain       Objective:  HIP PROM:  · Extension: Right = unable to achieve neutral position prior to Tx, able to reach neutral position after ther ex, Left = 5 deg  KNEE PROM:  · Seated with PT PROM : Left = 102 deg flexion  · Total Gym PROM: Left= 100 deg    Treatment:  THER EX:  · Hip flexor stretch: pt lying supine with proximal 2/3 of Left thigh on table, and remainder of Left leg off table, and Right leg flexed with foot on table.  From here contract-relax stretching and prolonged low load stretching was applied to the Left hip flexor  · Hip Abd in sidelying: 2 x 10  · Clamshell with orange band loop: 2 x 10  · Prone knee flexion: x 20 each  · Prone hip extension: 2 x 10 each  · SABA x 2 min  · Sidestepping in // bars w/o and w/ small hurdles   · Backward walking in // bars w/o and w/ small hurdles  TOTAL GYM: Level 7  · 20 Reps, bilat LEs,   · L LE on, 2 x 10 reps  NK TABLE:  · knee extension, 2 sets, 12 reps, 7.5 pounds    GAIT TRAINING:    Rehab Potential/Assessment/Plan:  Oleg continues to gradually improve.  I was able to progress strengthening & dynamic balance.  While completing dynamic balance in the // bars he reported provocation of his anterior Left hip pain.      Patient continues to benefit from skilled PT through 1/22/18.  Will complete PN on 1/22/18.      At next session: Cont current POC     Thank you for allowing us to share in the care of this patient. Please be advised the order we received does not include a specified duration of treatment, therefore, this order will only be good for 30 days. If you have any questions, recommendations, or further concerns regarding this patient, please feel free to contact me at [phone].  Signed by: JOSSIE  ANGEL, PT  1/15/2018  12:29 PM

## 2018-01-17 PROBLEM — S66.212D: Status: RESOLVED | Noted: 2017-12-07 | Resolved: 2018-01-17

## 2018-01-17 NOTE — PROGRESS NOTES
Subjective   Patient ID: Juan Corona is a 67 y.o. male.    Chief Complaint:  Chief Complaint   Patient presents with   • Left Wrist - Post-op     Presents for S/P Left thumb tendon transfer of extensor indices to extensor pollicis longus on 1/5/18 by Christopher   • Left Hand - Post-op   • Left Leg - Pain     Oleg is a pleasant 67-year-old man who comes in today for a 2 week postop visit after having a left thumb tendon transfer of the extensor indices to extensor pollicis longus by Dr. Nabil Wolfe on 1/5/2018.    Patient is currently rating his pain today at a 0/10.  He is taking nothing orally for pain.  Patient denies having any fevers, chills, night sweats, erythema, warmth, swelling, or drainage from his incisions over the left thumb and wrist.  Patient denies any numbness/tingling or paresthesias the left upper extremity.  Overall, he is doing quite well from his surgery 2 weeks ago.    Oleg also is 5+ months post-op from an ORIF of the left distal femur from a periprosthetic fracture and an ORIF of the left distal radius with Dr. Nabil Wolfe on 8/30/2017.     Patient sustained a workman's comp. injury to his left wrist and left distal femur after being struck by a log while working for the [place] [employer] on 8/30/2017.     Patient is currently rating his pain today at a 0/10. He is taking nothing orally for pain. Patient denies any new injury or trauma to his left leg or left arm.    Patient is describing some left groin pain specifically with hip flexion.  Patient denies any numbness/tingling or paresthesias of the left upper or lower extremities. He denies any pain over the left femur with ambulation. He has been working on passive range of motion of the left knee from 0-90° of flexion with Alejandro Henning DPT.  He has been seen John Bosworth, OT for his left distal radius fracture.  He is ambulating without the use of an assisted device.  He is planning a trip down to [place] in early March 2018.  He is voicing no  other complaints at this time.      Social History     Occupational History   • Not on file.     Social History Main Topics   • Smoking status: Never Smoker   • Smokeless tobacco: Never Used   • Alcohol use No   • Drug use: No   • Sexual activity: Not on file      Review of Systems   Constitutional: Positive for activity change. Negative for appetite change, chills, diaphoresis, fatigue and fever.   Musculoskeletal: Positive for arthralgias and myalgias. Negative for back pain, gait problem and joint swelling.   Skin: Negative for color change, pallor, rash and wound.   Neurological: Positive for weakness (left hip flexion). Negative for numbness.   Psychiatric/Behavioral: Negative for agitation, behavioral problems, confusion, decreased concentration, dysphoric mood and sleep disturbance. The patient is not nervous/anxious.              Objective   Ortho Exam        Oleg is a pleasant 67-year-old man who looks his stated age.  He is alert and cooperative in no acute distress secondary to pain.  Patient is well nourished and appropriately dressed.  Gait is normal.    Left thumb examination:  Thumb spica splint was removed today without difficulty.  Incisions were examined showing no erythema, warmth, swelling, or drainage.  There is mild ecchymosis noted.  Patient is able to hold his IP joint of the left thumb in full extension against light manual resistance today.  Sutures removed today without difficulty.  Patient is neurovascularly intact the left upper extremity.  He has normal active range of motion of the left elbow.  Patient's  strength is 4/5 in the left hand.     Left wrist examination:  Incision is healing well over the left volar wrist. There is no erythema, warmth, swelling, or drainage appreciated. Patient is neurovascularly intact in the left upper extremity. Patient has normal active range of motion of the left elbow, shoulder, and wrist.     Left knee/hip examination:  Incisions are well-healed  over the left lateral knee and thigh. There is no erythema, warmth, swelling, or drainage appreciated. Patient is neurovascularly intact the left lower extremity with a negative Homans sign bilaterally. Passive range of motion of the left knee is from terminal extension up through 100° of flexion. Collateral ligaments are stable at 0 and 30° of flexion with valgus/varus stressing. There is atrophy noted over the left quadriceps.  Patient has no pain with internal/external rotation of the left hip in a supine or seated position.  He has 5/5 strength with hip adduction and abduction.  Patient has 4/5 strength with hip flexion of the left hip against manual resistance. Passive range of motion of the right knee is from 0-110° of flexion.    Assessment   Diagnoses and all orders for this visit:    Aftercare following surgery of the musculoskeletal system  Comments:  Left thumb tendon transfer of the extensor indices to extensor pollicis longus by Dr. Nabil Wolfe on 1/5/2018    Encounter for removal of sutures  Comments:  Left thumb    Periprosthetic fracture around prosthetic joint, subsequent encounter  Comments:  Left distal femur fracture, proximal to Left TKA from workman's comp. injury on 8/30/2017  Orders:  -     X-ray femur 2 views left    Other closed extra-articular fracture of distal end of left radius with routine healing, subsequent encounter  Comments:  Closed left distal radius and ulna styloid fracture from workman's comp. injury on 8/30/2017  Orders:  -     X-ray wrist 3 or more views left; Future    Strain of extensor muscle, fascia and tendon of left thumb at wrist and hand level, subsequent encounter  Comments:  rupture of EPL on 9/8/2017 of the left thumb              Plan    Today, patient will be referred to occupational therapy for an Orthoplast splint of the left thumb with John Bosworth, OT.  He will wear the splint for approximately 6 weeks after surgery.  Patient will work on range of motion  exercises with OT.    Patient will continue to be full weightbearing on left lower extremity.  He will continue with Alejandro Henning PT for outpatient physical therapy for his left knee and left hip.  He will follow-up at 1 year out from his surgery for new x-rays of the left femur or sooner if he is having any problems or questions.  He will not meet his maximum medical improvement until 1 year out from his surgery for his left femur and left wrist.    Patient will continue to be off work for another month.    Follow-up here in the office in 1 month for recheck of your left thumb.  All questions were answered today.

## 2018-01-19 ENCOUNTER — HOSPITAL ENCOUNTER (OUTPATIENT)
Dept: OCCUPATIONAL THERAPY | Facility: HOSPITAL | Age: 68
Setting detail: THERAPIES SERIES
Discharge: HOME | End: 2018-01-19
Payer: COMMERCIAL

## 2018-01-19 ENCOUNTER — OFFICE VISIT NO LOS (OUTPATIENT)
Dept: ORTHOPEDIC SURGERY | Facility: CLINIC | Age: 68
End: 2018-01-19
Payer: COMMERCIAL

## 2018-01-19 ENCOUNTER — ANCILLARY PROCEDURE (OUTPATIENT)
Dept: RADIOLOGY | Facility: CLINIC | Age: 68
End: 2018-01-19
Payer: COMMERCIAL

## 2018-01-19 ENCOUNTER — HOSPITAL ENCOUNTER (OUTPATIENT)
Dept: PHYSICAL THERAPY | Facility: HOSPITAL | Age: 68
Setting detail: THERAPIES SERIES
Discharge: HOME | End: 2018-01-19
Payer: COMMERCIAL

## 2018-01-19 VITALS — SYSTOLIC BLOOD PRESSURE: 124 MMHG | BODY MASS INDEX: 28.73 KG/M2 | WEIGHT: 178 LBS | DIASTOLIC BLOOD PRESSURE: 76 MMHG

## 2018-01-19 DIAGNOSIS — Z47.89 AFTERCARE FOLLOWING SURGERY OF THE MUSCULOSKELETAL SYSTEM: ICD-10-CM

## 2018-01-19 DIAGNOSIS — S52.552D OTHER CLOSED EXTRA-ARTICULAR FRACTURE OF DISTAL END OF LEFT RADIUS WITH ROUTINE HEALING, SUBSEQUENT ENCOUNTER: ICD-10-CM

## 2018-01-19 DIAGNOSIS — M97.9XXD PERIPROSTHETIC FRACTURE AROUND PROSTHETIC JOINT, SUBSEQUENT ENCOUNTER: ICD-10-CM

## 2018-01-19 DIAGNOSIS — Z47.89 AFTERCARE FOLLOWING SURGERY OF THE MUSCULOSKELETAL SYSTEM: Primary | ICD-10-CM

## 2018-01-19 DIAGNOSIS — Z48.89 ENCOUNTER FOR OTHER SPECIFIED SURGICAL AFTERCARE (CODE): Primary | ICD-10-CM

## 2018-01-19 DIAGNOSIS — S66.212D STRAIN OF EXTENSOR MUSCLE, FASCIA AND TENDON OF LEFT THUMB AT WRIST AND HAND LEVEL, SUBSEQUENT ENCOUNTER: ICD-10-CM

## 2018-01-19 DIAGNOSIS — Z48.02 ENCOUNTER FOR REMOVAL OF SUTURES: ICD-10-CM

## 2018-01-19 PROCEDURE — 97140 MANUAL THERAPY 1/> REGIONS: CPT | Mod: GP | Performed by: PHYSICAL THERAPIST

## 2018-01-19 PROCEDURE — 97110 THERAPEUTIC EXERCISES: CPT | Mod: GP | Performed by: PHYSICAL THERAPIST

## 2018-01-19 PROCEDURE — 73110 X-RAY EXAM OF WRIST: CPT | Mod: 26 | Performed by: ORTHOPAEDIC SURGERY

## 2018-01-19 PROCEDURE — 99024 POSTOP FOLLOW-UP VISIT: CPT | Performed by: PHYSICIAN ASSISTANT

## 2018-01-19 PROCEDURE — 73110 X-RAY EXAM OF WRIST: CPT | Mod: TC,LT | Performed by: PHYSICIAN ASSISTANT

## 2018-01-19 PROCEDURE — 73552 X-RAY EXAM OF FEMUR 2/>: CPT | Mod: 26 | Performed by: ORTHOPAEDIC SURGERY

## 2018-01-19 PROCEDURE — 97165 OT EVAL LOW COMPLEX 30 MIN: CPT | Mod: GO | Performed by: OCCUPATIONAL THERAPIST

## 2018-01-19 PROCEDURE — 73552 X-RAY EXAM OF FEMUR 2/>: CPT | Mod: TC,LT | Performed by: PHYSICIAN ASSISTANT

## 2018-01-19 PROCEDURE — 29126 APPL SHORT ARM SPLINT DYN: CPT | Mod: GO | Performed by: OCCUPATIONAL THERAPIST

## 2018-01-19 ASSESSMENT — ENCOUNTER SYMPTOMS
DIAPHORESIS: 0
MYALGIAS: 1
CONFUSION: 0
SLEEP DISTURBANCE: 0
FEVER: 0
AGITATION: 0
CHILLS: 0
WEAKNESS: 1
APPETITE CHANGE: 0
FATIGUE: 0
DYSPHORIC MOOD: 0
DECREASED CONCENTRATION: 0
ARTHRALGIAS: 1
BACK PAIN: 0
JOINT SWELLING: 0
NERVOUS/ANXIOUS: 0
COLOR CHANGE: 0
NUMBNESS: 0
WOUND: 0
ACTIVITY CHANGE: 1

## 2018-01-19 ASSESSMENT — PAIN - FUNCTIONAL ASSESSMENT: PAIN_FUNCTIONAL_ASSESSMENT: NO/DENIES PAIN

## 2018-01-19 NOTE — INTERDISCIPLINARY/THERAPY
[address]  [place] [address]  [phone]    [facility] Occupational Therapy Initial Evaluation    Patient Name: Juan Corona  Referring Doctor: SHAUNNA Garcia  Date of Service: 1/19/2018    PRIMARY MEDICAL DIAGNOSIS: Z47.89 (ICD-10-CM) - Aftercare following surgery of the musculoskeletal system.    TREATMENT DIAGNOSIS:  Patient is status post left thumb tendon transfer from the extensor indices to the extensor pollicis longus with Dr Wolfe on 1-5-18.    SUBJECTIVE:     Profile and History: On 1/5/2018 patient had a surgical intervention with the transfer of his extensor indicates tendon to his extensor pollicis longus of his left hand and thumb.  Patient had this surgery secondary to a fall that was work-related.       Activities limited by Patient's compliant: Patient is limited with his functional use of his bilateral  upper extremity status post above-stated surgical intervention      Objective: Current treatment provided:  -fabricated  Thermoplastic splint for protection of above stated surgical intervention  -reviewed OT plan of care.    -Reviewed extensor indicis tendon to extensor pollicis longus tendon transfer protocol with the patient      Prior Level of Function: Independent    Pain: Patient reported no pain on this date    Past Medical History: See chart review    Medications: See chart review    Allergies: See chart review    Social, Occupational, and Leisure: Patient enjoys socializing with friends and family, watching TV.    Lifestyle and Living Environment: Patient lives alone in his own home    Activities Limited by Patient’s Complaint Include: Difficulties with bilateral upper extremity function secondary above-stated surgical intervention    Patient’s Goals for Therapy: Improve patient's use of his left hand.    OBJECTIVE:       ASSESSMENT OF OCCUPATIONAL PERFORMANCE:      ADL’S: Patient has difficulties with ADLs with but zippers and fasteners as he is unable to utilize  his left thumb at this time due to the above-stated surgical intervention          IADL’S: Patient has difficulties with IADL tasks as he has imminent functional use of his left hand at this time as stated above          UPPER EXTREMITY STATUS:  HAND DOMINANCE: Right    ROM: Right range of motion is within functional limits left was not tested at this time as he is only 2 weeks post surgical intervention for tendon transfer.    STRENGTH TESTING: Right within functional limits left not tested at this time as he is only 2 weeks post surgical intervention    SENSATION: Intact patient reports no sensory loss    MUSCLE TONE: Intact    VISION: Intact    COGNITION: Intact    PSYCHOSOCIAL:  Motivated to participate in rehab. Program    CURRENT EQUIPMENT AND NEEDS: Patient is in need of a custom fit thermoplasty splint    EDUCATION:    Patient educated on extensor indicis to extensor pollicis longus transfer protocol  Splint wearing and skin integrity evaluation    EVAL TIME DURATION: 15 min  TREATMENT TIME DURATION: splint 30 min  REHAB POTENTIAL: Good      PLAN OF CARE/CLINICAL DECISION MAKING/ASSESSMENT: Patient was seen for initial Occupational Therapy evaluation on this date secondary to surgical procedure for an extensor indicis to extensor pollicis longus transfer.  Patient was fit with a custom fit thermoplasty splint per physician order patient was instructed in wearing of splint as well as monitoring of skin integrity.  Reviewed and instructed patient on extensor indicis to extensor pollicis longus transfer protocol.  Patient is currently 2 weeks postop, he will need therapy 1 time a week for 2 weeks for monitoring of his splint positioning of his hand.  Began light passive range of motion.  Patient will then need therapeutic interventions 2-3 times per week per  protocol for range of motion, strengthening, and physical agent modalities as deemed appropriate.       PLAN:   Therapeutic exercise  Activities of daily  living  Therapeutic dynamic activity  Splinting  Manual therapy techniques  Physical agent modalities  Iontophoresis    FREQUENCY AND DURATION OF TREATMENT:  The patient will be seen for 30-45 minutes 1-3 times per week for 6-8 weeks.    FUNCTIONAL GOALS:  The following goals were established with the patient and agreed upon:    Short-term Goals:  Goals to be met in 2-3weeks  1.  Patient to be fit with custom fit thermoplasty splint per physician order.  2.  Patient to be educated on splint wearing and monitoring of skin integrity  3.  Patient to be educated on extensor indicis to extensor pollicis longus transfer protocol      Long-term Goals:  Goals to be met in 6-8 weeks  1.  Patient to regain full range of motion of his thumb and all movement patterns.  2.  Patient ability uses left hand for ADL tasks such as but zippers and fasteners with no difficulties  3.  Patient will regain power of grasp to 20 pounds or greater in his left hand  4.  Patient will regain functional strength in his left hand to be able to hold a full cup of coffee.    Thank you for allowing us to share in the care of this patient.  If you have any questions, recommendations, or further concerns regarding this patient, please feel free to contact me at [phone]    Respectfully,  Jon Bosworth, OTR/ANGE

## 2018-01-19 NOTE — PATIENT INSTRUCTIONS
-You may continue to be full weightbearing on the left lower extremity.  -Start occupational therapy with John Bosworth, OT for an Orthoplast splint on the left thumb and for range of motion exercises of the left thumb, hand, and wrist.  -Continue with outpatient physical therapy with Alejandro Henning DPT for left knee range of motion and left hip strengthening and range of motion.  -You are off work for another month.    -Follow-up here in the office in 1 month for a 6 week postop visit and recheck of your left thumb.

## 2018-01-19 NOTE — INTERDISCIPLINARY/THERAPY
[address] [place] [address]  [phone]    [facility] Physical Therapy Progress Note     Patient Name: Juan Corona  Referring practitioner: Nabil Wolfe DO; SHAUNNA Garcia  Date of Service: 1/19/2018   HICN: [id number]    Visits from Start of Care: Visit Number: 17    Primary Medical Diagnosis: Encounter for other specified surgical aftercare (CODE) [Z48.89]     Treatment Diagnosis: Imparied Left hip and knee ROM and strength causing altered gait and loss of function due to distal left femure Fx with ORIF on 8/30/17.      Subjective:  Oleg is pleased with the improvement in his left knee.  He continues to complain of left anterior hip discomfort during the motion of hip flexion such as when walking fast or ambulating up stairs.  Pain:  Pain Assessment  Pain Assessment: No/denies pain    Objective:  OBSERVATION:  · Integumentary: incisions well healed.  No swelling.    · Gait:  · Short Distance: normal pattern  · 6 min Walk:   · Stairs: normal pattern, w/ c/o discomfort during Left hip flexion    RANGE OF MOTION  · Flexion: Right = 100 deg, Left = 99 deg  · Knee Extension: Left = 8 deg    MANUAL STRENGTH:  · Hip Flexion: Right = 5/5, Left = 4+/5  · Hip Abduction: Right = 5/5, Left = 4+/5  · Hip Extension: Left active = cannot achieve neutral, PROM able to achieve 0 deg  · Knee Extension: Right = 5/5, Left = 4+/5  · Knee Flexion: Right = 4/5, Left = 4/5    Treatment:  MANUAL THERAPY: I provided passive hip flexor stretch, as well as anterior glides of the coxofemoral joint with patient prone  THERAPEUTIC EXERCISE: Repeated prior there ex from prior session.    Rehab Potential/Assessment/Plan:  Juan is progressing well following left distal fibular fracture with ORIF.  At this time he appears to have recovered his left knee range of motion.  He is nearly recovered his left knee strength for flexion and extension.  His endurance should continue to slowly improve in the lower extremity as well.   Because of his impaired left knee range of motion following the injury and surgery, he reaggravated his left hip flexor which had been previously strained.  The strain is improving over time.  Oleg should continuephysical therapy for the next 4 weeks to resolve the hip flexor strain.     Prognosis  Assessment: Decreased LE ROM, Decreased LE strength  Prognosis: Good  Plan  PT Frequency: 1-2x/wk  Treatment Duration : 4 weeks  Short Term PT Goals  Short Term PT Goal 1: Will be compliant with home program  Short Term PT Goal Progress 1: Met  Short Term PT Goal 2: Oleg will be compliant with activity restriction  Short Term PT Goal Progress 2: Met  Additional Short Term PT Goals: Yes  Long Term PT Goals  Long Term PT Goal 1: Oleg will display 5/5 left knee extension strength during manual muscle test  Long Term PT Goal Progress 1: Progressing  Long Term PT Goal 2: Oleg will ambulate 1 flight of stairs without an assistive device using an alternating gait pattern  Long Term PT Goal Progress 2: Progressing  Long Term PT Goal 3: Oleg will balance 30 seconds during left single leg stance  Long Term PT Goal Progress 3: Met  Long Term PT Goal 4: Oleg will display 95° of left knee active range of motion  Long Term PT Goal Progress 4: Met      Thank you for allowing us to share in the care of this patient. Please be advised the order we received does not include a specified duration of treatment, therefore, this order will only be good for 30 days. If you have any questions, recommendations, or further concerns regarding this patient, please feel free to contact me at [phone].  Signed by: JOSSIE ORTIZ, PT  1/19/2018  9:09 AM

## 2018-01-19 NOTE — LETTER
ORTHOPEDICS & SPORTS MEDICINE ORTHOPEDIC SURGERY  [address] [place] [address]  [phone]  Dept: [phone]  January 19, 2018     Patient: Juan Corona   YOB: 1950   Date of Visit: 1/19/2018       To Whom it May Concern:    As of 1/9/18, Juan is to remain medically off of work until assessment at his next appointment on, or around 2/19/18.          Sincerely,         SHAUNNA ROSALES        CC: No Recipients

## 2018-01-22 ENCOUNTER — HOSPITAL ENCOUNTER (OUTPATIENT)
Dept: PHYSICAL THERAPY | Facility: HOSPITAL | Age: 68
Setting detail: THERAPIES SERIES
Discharge: HOME | End: 2018-01-22
Payer: COMMERCIAL

## 2018-01-22 DIAGNOSIS — Z48.89 ENCOUNTER FOR OTHER SPECIFIED SURGICAL AFTERCARE (CODE): Primary | ICD-10-CM

## 2018-01-22 PROCEDURE — 97110 THERAPEUTIC EXERCISES: CPT | Mod: GP | Performed by: PHYSICAL THERAPIST

## 2018-01-22 PROCEDURE — 97140 MANUAL THERAPY 1/> REGIONS: CPT | Mod: GP | Performed by: PHYSICAL THERAPIST

## 2018-01-22 NOTE — INTERDISCIPLINARY/THERAPY
[facility] PT Daily Treatment Note      Patient Name: Juan Corona  Date of Service: 1/22/2018  Visit Number: Visit Number: 18      Subjective:   Oelg denies any discomfort today.  He completed his appointment with orthopedics last week as planned.      Pain:  Pain Assessment  Pain Assessment: No/denies pain       Objective:  HIP PROM:  · Extension: Right = unable to achieve neutral position prior to Tx, able to reach neutral position after ther ex, Left = 5 deg  KNEE PROM:  · Seated with PT PROM : Left = 102 deg flexion  · Total Gym PROM: Left= 100 deg    Treatment:  THER EX:  · Hip flexor stretch: pt lying supine with proximal 2/3 of Left thigh on table, and remainder of Left leg off table, and Right leg flexed with foot on table.  From here contract-relax stretching and prolonged low load stretching was applied to the Left hip flexor  · Hip Abd in sidelying: x 0  · Clamshell with orange band loop: x 0  · Prone knee flexion: x 0  · Prone hip extension: 2 x 10 L LE only  · SABA x 2 min  · Sidestepping in // bars- Not performed  · Backward walking in // bars w/o and w/ small hurdles  · TOTAL GYM: Level 7  · 20 Reps, bilat LEs,   · L LE on, 2 x 10 reps  · NK TABLE:  · knee extension, 2 sets, 10 reps, 10 pounds    MANUAL THERAPY:  · Prone Left hip flexor stretch with PA mob of Left hip, 10 x 10 sec hold  · Passive hip extension with pt prone x 20    Rehab Potential/Assessment/Plan:  Oleg continues benefit from ther ex and manual therapy to reduce Left hip flexor strain.  He should continue PT per my plan of care from the progress note on 1/19/22.    Patient continues to benefit from skilled PT    At next session: Cont current POC     Thank you for allowing us to share in the care of this patient. Please be advised the order we received does not include a specified duration of treatment, therefore, this order will only be good for 30 days. If you have any questions, recommendations, or further concerns regarding  this patient, please feel free to contact me at [phone].  Signed by: JOSSIE ORTIZ, PT  1/22/2018  8:37 AM

## 2018-01-24 ENCOUNTER — HOSPITAL ENCOUNTER (OUTPATIENT)
Dept: PHYSICAL THERAPY | Facility: HOSPITAL | Age: 68
Setting detail: THERAPIES SERIES
Discharge: HOME | End: 2018-01-24
Payer: COMMERCIAL

## 2018-01-24 ENCOUNTER — HOSPITAL ENCOUNTER (OUTPATIENT)
Dept: OCCUPATIONAL THERAPY | Facility: HOSPITAL | Age: 68
Setting detail: THERAPIES SERIES
Discharge: HOME | End: 2018-01-24
Payer: COMMERCIAL

## 2018-01-24 DIAGNOSIS — Z48.89 ENCOUNTER FOR OTHER SPECIFIED SURGICAL AFTERCARE (CODE): Primary | ICD-10-CM

## 2018-01-24 PROCEDURE — 97140 MANUAL THERAPY 1/> REGIONS: CPT | Mod: GO | Performed by: OCCUPATIONAL THERAPIST

## 2018-01-24 PROCEDURE — 97530 THERAPEUTIC ACTIVITIES: CPT | Mod: GO | Performed by: OCCUPATIONAL THERAPIST

## 2018-01-24 PROCEDURE — 97140 MANUAL THERAPY 1/> REGIONS: CPT | Mod: GP | Performed by: PHYSICAL THERAPIST

## 2018-01-24 PROCEDURE — 97110 THERAPEUTIC EXERCISES: CPT | Mod: GP | Performed by: PHYSICAL THERAPIST

## 2018-01-24 NOTE — INTERDISCIPLINARY/THERAPY
[facility] OT Daily Treatment Note     Patient Name: Juan Corona  Date of Service: 1/24/2018  Visit number 2  Subjective:  My splint is fitting well and I don't have any pain.    Pain:  Pain Assessment  Pain Assessment: No/denies pain    Objective:  -splint assessment: provided new strapping, splint fitting well with no skin irritation noted.  -light scar massage  -light AAROM IP joint of thumb      Assessment:   Splint fitting well, no skin irritation noted, reinforced Protocol to remain in splint 24/7 with only sensory/light massage and light scar massage recommended at this time.    Plan:  Splinting, there ex, there dyn, physical agent modalities as deemed appropriate.     Thank you for allowing us to share in the care of this patient.  If you have any questions, recommendations, or further concerns regarding this patient, please feel free to contact me at [phone]  Signed by: JON BOSWORTH, OTR  1/24/2018  3:17 PM

## 2018-01-24 NOTE — INTERDISCIPLINARY/THERAPY
[facility] PT Daily Treatment Note      Patient Name: Juan Corona  Date of Service: 1/24/2018  Visit Number: Visit Number: 19      Subjective:   Oleg states his Left hip flexor felt good after the prior PT Tx session, but the next day it felt the same again.      Pain:  Pain Assessment  Pain Assessment: No/denies pain       Objective:  HIP PROM:  · Extension: Left = 10 deg  KNEE PROM:  · Seated with PT PROM : Left = NT  · Total Gym PROM: Left= NT    Treatment:  THER EX:  · Hip flexor stretch: pt lying supine with proximal 2/3 of Left thigh on table, and remainder of Left leg off table, and Right leg flexed with foot on table.  From here contract-relax stretching and prolonged low load stretching was applied to the Left hip flexor  · Hip Abd in sidelying: x 0  · Clamshell with orange band loop: x 0  · Prone knee flexion: x 0  · Prone hip extension: 2 x 10 L LE only  · SBAA x 2 min  · Sidestepping in // bars- Not performed  · Backward walking in // bars w/o and w/ small hurdles  · TOTAL GYM: Level 7  · 20 Reps, bilat LEs,   · L LE on, 2 x 10 reps  · NK TABLE:  · knee extension, 2 sets, 10 reps, 10 pounds    MANUAL THERAPY:  · Prone Left hip flexor stretch with PA mob of Left hip, 10 x 10 sec hold  · Passive hip extension with pt prone x 20    Rehab Potential/Assessment/Plan:  Oleg continues to benefit from ther ex and manual therapy to reduce Left hip flexor strain.  After MT today his hip extension PROM increased to 10 deg.  Oleg denies pain with ambulation.  He only exercises hip flexor discomfort when the thigh is lifted higher, such as to ascend stairs.      Patient continues to benefit from skilled PT    At next session: next week attempt AAROM hip flexion in supine and standing.       Thank you for allowing us to share in the care of this patient. Please be advised the order we received does not include a specified duration of treatment, therefore, this order will only be good for 30 days. If you have any  questions, recommendations, or further concerns regarding this patient, please feel free to contact me at [phone].  Signed by: JOSSIE ORTIZ, PT  1/24/2018  3:12 PM

## 2018-01-30 ENCOUNTER — HOSPITAL ENCOUNTER (OUTPATIENT)
Dept: PHYSICAL THERAPY | Facility: HOSPITAL | Age: 68
Setting detail: THERAPIES SERIES
Discharge: HOME | End: 2018-01-30
Payer: COMMERCIAL

## 2018-01-30 DIAGNOSIS — Z48.89 ENCOUNTER FOR OTHER SPECIFIED SURGICAL AFTERCARE (CODE): Primary | ICD-10-CM

## 2018-01-30 PROCEDURE — 97110 THERAPEUTIC EXERCISES: CPT | Mod: GP | Performed by: PHYSICAL THERAPIST

## 2018-01-30 NOTE — INTERDISCIPLINARY/THERAPY
[facility] PT Daily Treatment Note      Patient Name: Juan Corona  Date of Service: 1/30/2018  Visit Number: Visit Number: 20      Subjective:   Oleg states his Left hip continues to feel better.      Pain:  Pain Assessment  Pain Assessment: No/denies pain       Objective:  HIP PROM:  · Extension: Left = 10 deg  KNEE PROM:  · Seated with PT PROM : Left = NT  · Total Gym PROM: Left= NT    Treatment:  THER EX:  Access Code: [email or web address]   URL: [email or web address]   Date: 01/30/2018   Prepared by: Alejandro Pineda     Exercises  Supine Hamstring Stretch - 5 hold - 20 reps - 1 sets - 1x daily  Prone Knee Flexion - 20 reps - 1 sets - 1x daily  Prone Hip Extension - 20 reps - 1 sets - 1x daily  Livan Stretch on Table - 10 reps - 1 sets - 1x daily  Sidelying Clamshell in Neutral - 20 reps - 1 sets - 1x daily  Sidelying Hip Abduction - 20 reps - 1 sets - 1x daily  Standing Heel Raise with Support - 20 reps - 1 sets - 1x daily  Single Leg Stance - 2 reps - 30 seconds - 1x daily  Standing Hip Flexion March - 10 reps - 1 sets - 1x daily  Squat with Chair Touch - 10 reps - 1 sets - 1x daily  Standing Hip Flexor Stretch - 5 hold - 10 reps - 1 sets - 1x daily  · TOTAL GYM: Level 7  · 20 Reps, bilat LEs,   · L LE on, 2 x 10 reps  · NK TABLE:  · knee extension, 2 sets, 10 reps, 10 pounds    MANUAL THERAPY:  · Prone Left hip flexor stretch with PA mob of Left hip, 10 x 10 sec hold  · Passive hip extension with pt prone x 20    Rehab Potential/Assessment/Plan:  Today I was able to progress Oleg's ther ex as planned.  I had him begin hip flexion AAROM, prone hip ext and prone knee flexion.      Patient continues to benefit from skilled PT    At next session: discuss response to ther ex.       Thank you for allowing us to share in the care of this patient. Please be advised the order we received does not include a specified duration of treatment, therefore, this order will only be good for 30 days. If you have any  questions, recommendations, or further concerns regarding this patient, please feel free to contact me at [phone].  Signed by: JOSSIE ORTIZ, PT  1/30/2018  9:23 AM

## 2018-02-02 ENCOUNTER — HOSPITAL ENCOUNTER (OUTPATIENT)
Dept: PHYSICAL THERAPY | Facility: HOSPITAL | Age: 68
Setting detail: THERAPIES SERIES
Discharge: HOME | End: 2018-02-02
Payer: COMMERCIAL

## 2018-02-02 ENCOUNTER — HOSPITAL ENCOUNTER (OUTPATIENT)
Dept: OCCUPATIONAL THERAPY | Facility: HOSPITAL | Age: 68
Setting detail: THERAPIES SERIES
Discharge: HOME | End: 2018-02-02
Payer: COMMERCIAL

## 2018-02-02 DIAGNOSIS — Z48.89 ENCOUNTER FOR OTHER SPECIFIED SURGICAL AFTERCARE (CODE): Primary | ICD-10-CM

## 2018-02-02 PROCEDURE — 97530 THERAPEUTIC ACTIVITIES: CPT | Mod: GO | Performed by: OCCUPATIONAL THERAPIST

## 2018-02-02 PROCEDURE — 97110 THERAPEUTIC EXERCISES: CPT | Mod: GO | Performed by: OCCUPATIONAL THERAPIST

## 2018-02-02 PROCEDURE — 97140 MANUAL THERAPY 1/> REGIONS: CPT | Mod: GP | Performed by: PHYSICAL THERAPIST

## 2018-02-02 PROCEDURE — 97110 THERAPEUTIC EXERCISES: CPT | Mod: GP | Performed by: PHYSICAL THERAPIST

## 2018-02-02 NOTE — INTERDISCIPLINARY/THERAPY
"[facility] PT Daily Treatment Note      Patient Name: Juan Corona  Date of Service: 2/2/2018  Visit Number: Visit Number: 21      Subjective:   Oleg states he completed the updated HEP w/o problems.  \"I know I'm doing better, b/c I don't have to awkwardly swing my leg to walk up the stairs.\"    Pain:  Pain Assessment  Pain Assessment: No/denies pain       Objective:  HIP PROM:  · Extension: Left = 10 deg  KNEE PROM:  · Seated with PT PROM : Left = NT  · Total Gym PROM: Left= NT    Treatment:  THER EX & GAIT TRAINING:  Access Code: [email or web address]   URL: [email or web address]   Date: 01/30/2018   Prepared by: Alejandro Pineda     Exercises  Supine Hamstring Stretch - 5 hold - 20 reps - 1 sets - 1x daily  Prone Knee Flexion - 20 reps - 1 sets - 1x daily  Prone Hip Extension - 20 reps - 1 sets - 1x daily  Livan Stretch on Table - 10 reps - 1 sets - 1x daily  Sidelying Clamshell in Neutral - 20 reps - 1 sets - 1x daily  Sidelying Hip Abduction - 20 reps - 1 sets - 1x daily  Standing Heel Raise with Support - 20 reps - 1 sets - 1x daily  Single Leg Stance - 2 reps - 30 seconds - 1x daily  Standing Hip Flexion March - 10 reps - 1 sets - 1x daily  Squat with Chair Touch - 10 reps - 1 sets - 1x daily  Standing Hip Flexor Stretch - 5 hold - 10 reps - 1 sets - 1x daily  · TOTAL GYM: Level 7  · 20 Reps, bilat LEs,   · L LE on, 2 x 10 reps  · NK TABLE:  · knee extension, 2 sets, 10 reps, 10 pounds  · Gait  · 100 meters on track  · 1 flight stairs w/o HRs    MANUAL THERAPY:  · Prone Left hip flexor stretch with PA mob of Left hip, 10 x 10 sec hold  · Passive hip extension with pt prone x 20    Rehab Potential/Assessment/Plan:  Today I continued Oleg's Tx as his Left hip flexor continues to improve.        Patient continues to benefit from skilled PT    At next session: cont Tx 1 time per week      Thank you for allowing us to share in the care of this patient. Please be advised the order we received does not include a " specified duration of treatment, therefore, this order will only be good for 30 days. If you have any questions, recommendations, or further concerns regarding this patient, please feel free to contact me at [phone].  Signed by: JOSSIE ORTIZ, PT  2/2/2018  9:47 AM

## 2018-02-02 NOTE — INTERDISCIPLINARY/THERAPY
[facility] OT Daily Treatment Note     Patient Name: Juan Corona  Date of Service: 2/2/2018  Visit number 3  Subjective:  I am at the end of 4 week post op today     Pain:  Pain Assessment  Pain Assessment: No/denies pain    Objective:  -light scar massage  -light AROM thumb ab/adduction, thumb opposition,   -Provided pt the extensor indicis to extensor pollicis longus transfer protocol. See scanned documents  -Moist heat (l) hand per protocol      Assessment:   Progressed to week 5 protocol with light AROM as indicated above.    Plan:  Splinting, there ex, there dyn, physical agent modalities as deemed appropriate.     Thank you for allowing us to share in the care of this patient.  If you have any questions, recommendations, or further concerns regarding this patient, please feel free to contact me at [phone]  Signed by: JON BOSWORTH, OTR  2/2/2018  10:21 AM

## 2018-02-05 ENCOUNTER — HOSPITAL ENCOUNTER (OUTPATIENT)
Dept: PHYSICAL THERAPY | Facility: HOSPITAL | Age: 68
Setting detail: THERAPIES SERIES
Discharge: HOME | End: 2018-02-05
Payer: COMMERCIAL

## 2018-02-05 ENCOUNTER — HOSPITAL ENCOUNTER (OUTPATIENT)
Dept: OCCUPATIONAL THERAPY | Facility: HOSPITAL | Age: 68
Setting detail: THERAPIES SERIES
Discharge: HOME | End: 2018-02-05
Payer: COMMERCIAL

## 2018-02-05 DIAGNOSIS — Z48.89 ENCOUNTER FOR OTHER SPECIFIED SURGICAL AFTERCARE (CODE): Primary | ICD-10-CM

## 2018-02-05 PROCEDURE — 97530 THERAPEUTIC ACTIVITIES: CPT | Mod: GO | Performed by: OCCUPATIONAL THERAPIST

## 2018-02-05 PROCEDURE — 97110 THERAPEUTIC EXERCISES: CPT | Mod: GO | Performed by: OCCUPATIONAL THERAPIST

## 2018-02-05 PROCEDURE — 97140 MANUAL THERAPY 1/> REGIONS: CPT | Mod: GP | Performed by: PHYSICAL THERAPIST

## 2018-02-05 PROCEDURE — 97110 THERAPEUTIC EXERCISES: CPT | Mod: GP | Performed by: PHYSICAL THERAPIST

## 2018-02-05 NOTE — INTERDISCIPLINARY/THERAPY
[facility] PT Daily Treatment Note      Patient Name: Juan Corona  Date of Service: 2/5/2018  Visit Number: Visit Number: 22      Subjective:   Oleg continues to feel better walking.      Pain:  Pain Assessment  Pain Assessment: No/denies pain       Objective:  HIP ROM:  · Extension: Left = 10 deg PROM  · Extension: Left = 0 deg AROM  · Flexion: Left = 90 deg PROM supine  · Flexion: Left = 60 deg AAROM standing  KNEE PROM:  · Seated with PT PROM : Left = NT  · Total Gym PROM: Left= NT    Treatment:  THER EX & GAIT TRAINING:  Access Code: [email or web address]   URL: [email or web address]   Date: 01/30/2018   Prepared by: Alejandro Pineda     Exercises  Supine Hamstring Stretch - 5 hold - 20 reps - 1 sets - 1x daily  Prone Knee Flexion - 20 reps - 1 sets - 1x daily  Prone Hip Extension - 20 reps - 1 sets - 1x daily  Livan Stretch on Table - 10 reps - 1 sets - 1x daily  Sidelying Clamshell in Neutral - 20 reps - 1 sets - 1x daily  Sidelying Hip Abduction - 20 reps - 1 sets - 1x daily  Standing Heel Raise with Support - 20 reps - 1 sets - 1x daily  Single Leg Stance - 2 reps - 30 seconds - 1x daily  Standing Hip Flexion March - 10 reps - 1 sets - 1x daily  Squat with Chair Touch - 10 reps - 1 sets - 1x daily  Standing Hip Flexor Stretch - 5 hold - 10 reps - 1 sets - 1x daily  · TOTAL GYM: Level 7  · 20 Reps, bilat LEs,   · L LE on, 2 x 10 reps  · NK TABLE:  · knee extension, 2 sets, 10 reps, 10 pounds  · Knee flexion, 2 sets, 10 reps, 15 lbs  · Gait  · 100 meters on track  · 1 flight stairs w/o HRs    MANUAL THERAPY:  · Prone Left hip flexor stretch with PA mob of Left hip, 10 x 10 sec hold  · Passive hip extension with pt prone x 20    Rehab Potential/Assessment/Plan:  Oleg's active hip flexion ROM is significantly improved.        Patient continues to benefit from skilled PT    At next session: cont Tx 1 time per week      Thank you for allowing us to share in the care of this patient. Please be advised the order we  received does not include a specified duration of treatment, therefore, this order will only be good for 30 days. If you have any questions, recommendations, or further concerns regarding this patient, please feel free to contact me at [phone].  Signed by: JOSSIE ORTIZ, PT  2/5/2018  8:31 AM

## 2018-02-05 NOTE — INTERDISCIPLINARY/THERAPY
[facility] OT Daily Treatment Note     Patient Name: Juan Corona  Date of Service: 2/5/2018  Visit number 3  Subjective:  I am getting more movement    Pain:  Pain Assessment  Pain Assessment: No/denies pain    Objective:  -Fluido therapy x 20 min  With temp ramped up to 108 deg with AROM exercises for thumb ab/adduction, flex/extension and oposition.  -light scar massage  -light AROM thumb ab/adduction, thumb opposition          Assessment:   Progressed to week 5 protocol with light AROM as indicated above.    Plan:  Splinting, there ex, there dyn, physical agent modalities as deemed appropriate.     Thank you for allowing us to share in the care of this patient.  If you have any questions, recommendations, or further concerns regarding this patient, please feel free to contact me at [phone]  Signed by: JON BOSWORTH, OTR  2/5/2018  9:55 AM

## 2018-02-07 ENCOUNTER — HOSPITAL ENCOUNTER (OUTPATIENT)
Dept: OCCUPATIONAL THERAPY | Facility: HOSPITAL | Age: 68
Setting detail: THERAPIES SERIES
Discharge: HOME | End: 2018-02-07
Payer: COMMERCIAL

## 2018-02-07 PROCEDURE — 97530 THERAPEUTIC ACTIVITIES: CPT | Mod: GO | Performed by: OCCUPATIONAL THERAPIST

## 2018-02-07 PROCEDURE — 97110 THERAPEUTIC EXERCISES: CPT | Mod: GO | Performed by: OCCUPATIONAL THERAPIST

## 2018-02-07 NOTE — INTERDISCIPLINARY/THERAPY
[facility] OT Daily Treatment Note     Patient Name: Juan Corona  Date of Service: 2/7/2018  Visit number 4  Subjective:  I think it's coming along    Pain:  Pain Assessment  Pain Assessment: No/denies pain    Objective:  -Fluido therapy x 20 min  With temp ramped up to 110 deg with AROM exercises for thumb ab/adduction, flex/extension and oposition.  -light scar massage  -light AROM thumb ab/adduction, thumb opposition  -AAROM thumb extension  -blocking AROM IP and MP joint of thumb    Assessment:   Progressed to week 5 protocol with light AROM as indicated above.    Plan:  Splinting, there ex, there dyn, physical agent modalities as deemed appropriate.     Thank you for allowing us to share in the care of this patient.  If you have any questions, recommendations, or further concerns regarding this patient, please feel free to contact me at [phone]  Signed by: JON BOSWORTH, OTR  2/7/2018  10:28 AM

## 2018-02-09 ENCOUNTER — TELEPHONE (OUTPATIENT)
Dept: CARDIOLOGY | Facility: CLINIC | Age: 68
End: 2018-02-09

## 2018-02-09 DIAGNOSIS — I49.3 PVC'S (PREMATURE VENTRICULAR CONTRACTIONS): Primary | ICD-10-CM

## 2018-02-09 DIAGNOSIS — E78.5 DYSLIPIDEMIA: ICD-10-CM

## 2018-02-09 RX ORDER — ATORVASTATIN CALCIUM 20 MG/1
20 TABLET, FILM COATED ORAL EVERY 24 HOURS
Qty: 90 TABLET | Refills: 3 | Status: SHIPPED | OUTPATIENT
Start: 2018-02-09 | End: 2022-11-14 | Stop reason: ALTCHOICE

## 2018-02-09 RX ORDER — METOPROLOL SUCCINATE 25 MG/1
25 TABLET, EXTENDED RELEASE ORAL DAILY
Qty: 90 TABLET | Refills: 3 | Status: SHIPPED | OUTPATIENT
Start: 2018-02-09 | End: 2019-01-17 | Stop reason: SDUPTHER

## 2018-02-09 NOTE — TELEPHONE ENCOUNTER
Patient called and would like metoprolol and atorvastatin filled.  He moved here from [place] and saw Dr. Lopez 1012/17.  Dr. Lopez has not prescribed those medications, will check if it is okay to refill.  He would like 90 days worth and to have them sent to [facility] in [place], phone [phone].  In the past they have mailed his medications out and he would like to continue with this service.

## 2018-02-09 NOTE — TELEPHONE ENCOUNTER
Patient had left message that he needed meds refilled.  Called patient, [phone], and left message for him to call my direct line with pharmacy preference and which meds need refilled.

## 2018-02-12 ENCOUNTER — HOSPITAL ENCOUNTER (OUTPATIENT)
Dept: PHYSICAL THERAPY | Facility: HOSPITAL | Age: 68
Setting detail: THERAPIES SERIES
Discharge: HOME | End: 2018-02-12
Payer: COMMERCIAL

## 2018-02-12 ENCOUNTER — HOSPITAL ENCOUNTER (OUTPATIENT)
Dept: OCCUPATIONAL THERAPY | Facility: HOSPITAL | Age: 68
Setting detail: THERAPIES SERIES
Discharge: HOME | End: 2018-02-12
Payer: COMMERCIAL

## 2018-02-12 DIAGNOSIS — Z48.89 ENCOUNTER FOR OTHER SPECIFIED SURGICAL AFTERCARE (CODE): Primary | ICD-10-CM

## 2018-02-12 PROCEDURE — 97110 THERAPEUTIC EXERCISES: CPT | Mod: GO | Performed by: OCCUPATIONAL THERAPIST

## 2018-02-12 PROCEDURE — 97530 THERAPEUTIC ACTIVITIES: CPT | Mod: GO | Performed by: OCCUPATIONAL THERAPIST

## 2018-02-12 PROCEDURE — 97140 MANUAL THERAPY 1/> REGIONS: CPT | Mod: GP | Performed by: PHYSICAL THERAPIST

## 2018-02-12 PROCEDURE — 97110 THERAPEUTIC EXERCISES: CPT | Mod: GP | Performed by: PHYSICAL THERAPIST

## 2018-02-12 NOTE — INTERDISCIPLINARY/THERAPY
[facility] Physical Therapy Discharge Note      Patient Name: Juan Corona  Referring Doctor: Nabil Wolfe DO  Date of Service: 2/12/2018   Number of Visits: Visit Number: 23    Primary Medical Diagnosis: Encounter for other specified surgical aftercare (CODE) [Z48.89]    Treatment Diagnosis: Impaired left hip and knee ROM and strength causing altered gait and loss of function due to distal left femur fracture with ORIF on 8/30/17.      Subjective:  Oleg denies any Left knee pain.  He is gradually returning to his prior left of function.      Pain:  Pain Assessment  Pain Assessment: No/denies pain      Objective:   OBSERVATION:  · Integumentary: incision well healed.    · Gait: decreased step length due to lack of hip extension bilat    HIP ROM:  · Extension: Left = 15 deg PROM, 8 deg AROM  · Flexion: Left = 90 deg PROM supine, 60 deg AROM in standing  · Abd: WFL  · Rotation: WFL    KNEE PROM:  · Seated Flexion with PT PROM : Left = 95 deg  · Supine AROM flex: Left = 88 deg    HIP MMT:  · FLEX: Left = 3+/5  · EXT: Left = 4/5  · ABD: 3+/5    KNEE MMT:  · FLEX: Left = 5/5  · EXT: Left = 5/5    Assessment & Plan:  Oleg has completed an episode of PT care following a traumatic injury at work.  He has recovered his prior Left knee ROM, and quad & hamstring strength.  Initially after his injury, his limited knee ROM caused an reccurance of Left hip flexor tendinopathy.  Over time, as his knee ROM improved, and with specific treatment, his Left hip flexor tendinopathy has improved significantly.  With time and continued performance of his home program, I expect full resolution of Left hip flexor tendinopathy with in the next 4 weeks.  Once the tendinopathy resolves, Oleg will have recovered his prior level of function, including his work status.  I would expect he could return to his prior work by May 2018.        Previously established goals:   Long Term PT Goals  Long Term PT Goal 1: Oleg will display 5/5 left knee  extension strength during MMT  Long Term PT Goal Progress 1: Met  Long Term PT Goal 2: Oleg will amb 1 flight of stairs w/o AD using alt. gait pattern  Long Term PT Goal Progress 2: Met  Long Term PT Goal 3: Oleg will balance 30 seconds on his L LE w/o hand support  Long Term PT Goal Progress 3: Met  Long Term PT Goal 4: Oleg will display 95 deg of Left knee flexion AROM  Long Term PT Goal Progress 4: Met       Thank you for allowing us to share in the care of this patient. If you have any questions, recommendations, or further concerns regarding this patient, please feel free to contact us at [phone].      Signed by: JOSSIE ORTIZ, PT  2/12/2018  8:52 AM

## 2018-02-12 NOTE — INTERDISCIPLINARY/THERAPY
[facility] OT Daily Treatment Note     Patient Name: Juan Corona  Date of Service: 2/12/2018  Visit number 4  Subjective:  I feel good about my thumb function    Pain: no pain reports this date       Objective:  -Fluido therapy x 20 min  With temp ramped up to 110 deg with AROM exercises for thumb ab/adduction, flex/extension and oposition.  -light scar massage  -light AROM thumb ab/adduction, thumb opposition  -AAROM thumb extension  -blocking AROM IP and MP joint of thumb    Assessment:   Progressed with light AAROM per protocol.    Plan:  Splinting, there ex, there dyn, physical agent modalities as deemed appropriate.     Thank you for allowing us to share in the care of this patient.  If you have any questions, recommendations, or further concerns regarding this patient, please feel free to contact me at [phone]  Signed by: JON BOSWORTH, OTR  2/12/2018  11:01 AM

## 2018-02-14 ENCOUNTER — HOSPITAL ENCOUNTER (OUTPATIENT)
Dept: OCCUPATIONAL THERAPY | Facility: HOSPITAL | Age: 68
Setting detail: THERAPIES SERIES
Discharge: HOME | End: 2018-02-14
Payer: COMMERCIAL

## 2018-02-14 PROCEDURE — 97110 THERAPEUTIC EXERCISES: CPT | Mod: GO | Performed by: OCCUPATIONAL THERAPIST

## 2018-02-14 PROCEDURE — 97530 THERAPEUTIC ACTIVITIES: CPT | Mod: GO | Performed by: OCCUPATIONAL THERAPIST

## 2018-02-14 NOTE — INTERDISCIPLINARY/THERAPY
[facility] OT Daily Treatment Note     Patient Name: Juan Corona  Date of Service: 2/14/2018  Visit number 5  Subjective:  I have a little swelling in the back of my hand today.    Pain: no pain reports this date  Pain Assessment  Pain Assessment: No/denies pain    Objective:  -light lymphatic massaged dorsal aspect of hand.  -light scar massage  -light AROM thumb ab/adduction, thumb opposition  -AAROM thumb extension  -blocking AROM IP and MP joint of thumb  -lymphatic compression wrap for dorsal hand edema.    Assessment:   Progressed with light AAROM per protocol.    Plan:  Splinting, there ex, there dyn, physical agent modalities as deemed appropriate.     Thank you for allowing us to share in the care of this patient.  If you have any questions, recommendations, or further concerns regarding this patient, please feel free to contact me at [phone]  Signed by: JON BOSWORTH, OTR  2/14/2018  12:23 PM

## 2018-02-14 NOTE — PROGRESS NOTES
Subjective   Patient ID: Juan Corona is a 67 y.o. male.    Chief Complaint:  Chief Complaint   Patient presents with   • Left Hand - Post-op     46 Days S/p Left thumb tendon transfer of extensor indices to extensor pollicis longus with Dr Wolfe. States he was seen at the [facility] ER . Evaluated by Dr Wolfe. Pain today 0/10.     Oleg is a pleasant 67-year-old man who comes in today for a 6+ week postop visit after having a left thumb tendon transfer of the extensor indices to extensor pollicis longus by Dr. Nabil Wolfe on 1/5/2018.    Patient was seen at the emergency room on 2/19/2018 for swelling in his left hand and forearm.  Dr. Wolfe was consulted and went to the emergency room for evaluation.  Patient was having swelling from his forearm down into his fingers.  This was related to his splint.  We will discontinue it at this time.     Patient is currently rating his pain today at a 0/10.  He is taking nothing orally for pain.  Patient denies having any fevers, chills, night sweats, erythema, warmth,  or drainage from his incisions over the left thumb and wrist.  Patient denies any numbness/tingling or paresthesias the left upper extremity.  He is having weakness of course with extension of his IP joint of the left thumb.  Overall, he is doing quite well from his surgery 6 weeks ago. He has not been back to work since his injury on 8/30/2017.      Oleg also is 6+ months post-op from an ORIF of the left distal femur from a periprosthetic fracture and an ORIF of the left distal radius with Dr. Nabil Wolfe on 8/30/2017.     Patient sustained a workman's comp. injury to his left wrist and left distal femur after being struck by a log while working for the [place] [employer] on 8/30/2017.     Patient is currently rating his pain today at a 0/10. He is taking nothing orally for pain. Patient denies any new injury or trauma to his left leg or left arm. Patient denies any numbness/tingling or paresthesias of the left upper or lower  extremities. He denies any pain over the left femur with ambulation. He does note some pain in the left groin with active flexion of the left hip.  He is ambulating without the use of an assisted device.    He is voicing no other complaints at this time.    Social History     Occupational History   • Not on file.     Social History Main Topics   • Smoking status: Never Smoker   • Smokeless tobacco: Never Used   • Alcohol use No   • Drug use: No   • Sexual activity: Not on file      Review of Systems   Constitutional: Positive for activity change. Negative for appetite change, chills, diaphoresis, fatigue and fever.   Gastrointestinal: Negative for constipation, diarrhea, nausea and vomiting.   Musculoskeletal: Positive for myalgias. Negative for arthralgias, back pain, gait problem and joint swelling.   Skin: Negative for color change, pallor, rash and wound.   Neurological: Positive for weakness. Negative for numbness.   Psychiatric/Behavioral: Negative for agitation, behavioral problems, confusion, decreased concentration, dysphoric mood, hallucinations and sleep disturbance. The patient is not nervous/anxious.              Objective   Ortho Exam      Oleg is a pleasant 67-year-old man who looks his stated age.  He is alert and cooperative in no acute distress secondary to pain.  Patient is well nourished and appropriately dressed.  Gait is normal.     Left thumb examination:  Thumb spica splint was removed today without difficulty.  Incisions were examined showing no erythema, warmth, swelling, or drainage.  Patient is able to hold his IP joint of the left thumb in  extension against light manual resistance today.   Patient is neurovascularly intact the left upper extremity.  He has normal active range of motion of the left elbow.  Patient's  strength is 4/5 in the left hand.      Left wrist examination:  Incision is healing well over the left volar wrist. There is no erythema, warmth, swelling, or drainage  appreciated. Patient is neurovascularly intact in the left upper extremity. Patient has normal active range of motion of the left elbow and wrist. Active range of motion of the left wrist is 45 degrees of flexion and 60 degrees of extension.      Left hip/knee examination:  Incisions are well-healed over the left lateral knee and thigh. There is no erythema, warmth, swelling, or drainage appreciated. Patient is neurovascularly intact the left lower extremity with a negative Homans sign bilaterally. Passive range of motion of the left knee is from terminal extension up through 110° of flexion. Collateral ligaments are stable at 0 and 30° of flexion with valgus/varus stressing. There is atrophy noted over the left quadriceps.  Patient has no pain with internal/external rotation of the left hip in a supine or seated position.  He has 5/5 strength with hip adduction and abduction.  Patient has 4/5 strength with hip flexion of the left hip against manual resistance. Passive range of motion of the right knee is from 0-110° of flexion.      Assessment   Diagnoses and all orders for this visit:    Aftercare following surgery of the musculoskeletal system  Comments:  Left thumb tendon transfer of the extensor indices to extensor pollicis longus by Dr. Nabil Wolfe on 1/5/2018    Periprosthetic fracture around internal prosthetic left knee joint, subsequent encounter  Comments:  Left distal femur fracture, proximal to Left TKA from workman's comp. injury on 8/30/2017    Other closed extra-articular fracture of distal end of left radius with routine healing, subsequent encounter  Comments:  Closed left distal radius and ulna styloid fracture from workman's comp. injury on 8/30/2017              Plan    Today, I recommended that the patient continue with occupational therapy for strengthening of his left thumb 2 times weekly over the next 6 weeks.  Patient was provided a new paper handout for therapy.  He was also provided a  order for a compressive hand garment for his left hand to help control his swelling.    Patient may return to sedentary duties at this time with no lifting, pushing, pulling, grasping of greater than 1-2 pounds.    Patient will follow up here in the office in 6 weeks for a 3 month postop visit and recheck of his left thumb.  All questions were answered today.

## 2018-02-16 ENCOUNTER — OFFICE VISIT (OUTPATIENT)
Dept: ORTHOPEDIC SURGERY | Facility: CLINIC | Age: 68
End: 2018-02-16
Payer: MEDICARE

## 2018-02-16 ENCOUNTER — HOSPITAL ENCOUNTER (OUTPATIENT)
Dept: OCCUPATIONAL THERAPY | Facility: HOSPITAL | Age: 68
Setting detail: THERAPIES SERIES
Discharge: HOME | End: 2018-02-16
Payer: COMMERCIAL

## 2018-02-16 ENCOUNTER — ANCILLARY PROCEDURE (OUTPATIENT)
Dept: RADIOLOGY | Facility: CLINIC | Age: 68
End: 2018-02-16
Payer: MEDICARE

## 2018-02-16 VITALS — WEIGHT: 183 LBS | BODY MASS INDEX: 29.54 KG/M2 | SYSTOLIC BLOOD PRESSURE: 110 MMHG | DIASTOLIC BLOOD PRESSURE: 70 MMHG

## 2018-02-16 DIAGNOSIS — M16.10 HIP ARTHRITIS: ICD-10-CM

## 2018-02-16 DIAGNOSIS — Z48.89 ENCOUNTER FOR POSTOPERATIVE CARE: Primary | ICD-10-CM

## 2018-02-16 PROCEDURE — 97110 THERAPEUTIC EXERCISES: CPT | Mod: GO | Performed by: OCCUPATIONAL THERAPIST

## 2018-02-16 PROCEDURE — 97530 THERAPEUTIC ACTIVITIES: CPT | Mod: GO | Performed by: OCCUPATIONAL THERAPIST

## 2018-02-16 PROCEDURE — 73502 X-RAY EXAM HIP UNI 2-3 VIEWS: CPT | Mod: 26 | Performed by: ORTHOPAEDIC SURGERY

## 2018-02-16 PROCEDURE — 99212 OFFICE O/P EST SF 10 MIN: CPT | Mod: PO | Performed by: PHYSICIAN ASSISTANT

## 2018-02-16 PROCEDURE — 99212 OFFICE O/P EST SF 10 MIN: CPT | Mod: 24 | Performed by: PHYSICIAN ASSISTANT

## 2018-02-16 PROCEDURE — 73502 X-RAY EXAM HIP UNI 2-3 VIEWS: CPT | Mod: LT,PO,FY

## 2018-02-16 ASSESSMENT — PAIN - FUNCTIONAL ASSESSMENT: PAIN_FUNCTIONAL_ASSESSMENT: NO/DENIES PAIN

## 2018-02-16 ASSESSMENT — ENCOUNTER SYMPTOMS
MYALGIAS: 1
NUMBNESS: 0

## 2018-02-16 NOTE — INTERDISCIPLINARY/THERAPY
[facility] Occupational Therapy Discharge Note      Patient Name: Juan Corona  Referring Doctor: Jim Rangel PA  Date of Service: 2/16/2018     Primary Medical Diagnosis: Z47.89 (ICD-10-CM) - Aftercare following surgery of the musculoskeletal system.  Treatment Diagnosis: Patient is status post left thumb tendon transfer from the extensor indices to the extensor pollicis longus with Dr Wolfe on 1-5-18.      This patient has been discharged from Occupational Therapy because:   Pt is traveling to [place] for 4-6 weeks     Visits from start of care:  Treatments were initiated on 1-19-18 and 8 treatments have been provided. Treatments ranged from 30 to 45 minutes, 1 to 2 times per week, and included:  Therapeutic exercise  Physical agent modalities  UE strengthening/ROM  Fluidotherapy  Splinting    Patient Compliance: good    Previously established goals:   Short-term Goals:  Goals to be met in 2-3weeks  1.  Patient to be fit with custom fit thermoplasty splint per physician order. Met  2.  Patient to be educated on splint wearing and monitoring of skin integrity. Met  3.  Patient to be educated on extensor indicis to extensor pollicis longus transfer protocol. Met        Long-term Goals:  Goals to be met in 6-8 weeks  1.  Patient to regain full range of motion of his thumb and all movement patterns. Not Met  2.  Patient ability uses left hand for ADL tasks such as but zippers and fasteners with no difficulties. Not met  3.  Patient will regain power of grasp to 20 pounds or greater in his left hand. Not Met  4.  Patient will regain functional strength in his left hand to be able to hold a full cup of coffee. Not Met  Current status:   -reviewed and educated on Extensor indicates to extensor pollicis longus transfer protocol  -Patient is currently pending his 6 week postoperative surgical intervention.  Continues to progress in his protocol for 5-8 week intervention strategies..  -Patient was educated on  progression of his protocol at 8 weeks.  Patient was provided with instructions for do's and don'ts strategies 8 week postoperative intervention.  Patient was also provided with the following home exercise program to begin on February 26, 2018    Access Code: [email or web address]   URL: [email or web address]   Date: 02/16/2018   Prepared by: Jon Bosworth     Exercises   Seated Wrist Extension with Dumbbell - 10 reps - 1 sets - 5 hold - 1x daily - 3x weekly   Seated Wrist Flexion with Dumbbell - 10 reps - 1 sets - 5 hold - 1x daily - 3x weekly   Seated Isometric Wrist Radial Deviation with Manual Resistance - 10 reps - 1 sets - 5 hold - 1x daily - 3x weekly   Seated Isometric Wrist Ulnar Deviation with Manual Resistance - 10 reps - 1 sets - 5 hold - 1x daily - 3x weekly   Seated Isometric Thumb Flexion - 10 reps - 1 sets - 5 hold - 1x daily - 3x weekly   Seated Isometric Thumb Extension with Manual Resistance - 10 reps - 1 sets - 5 hold - 1x daily - 3x weekly   Isometric Finger Flexion - 10 reps - 1 sets - 5 hold - 1x daily - 3x weekly   Finger Key  with Putty - 10 reps - 1 sets - 5 hold - 1x daily - 3x weekly   Putty Squeezes - 10 reps - 1 sets - 5 hold - 1x daily - 3x weekly   Finger Lumbricals with Putty - 10 reps - 1 sets - 5 hold - 1x daily - 3x weekly   3-Point Pinch with Putty - 10 reps - 1 sets - 5 hold - 1x daily - 3x weekly   Thumb MCP and IP Flexion with Putty - 10 reps - 1 sets - 5 hold - 1x daily - 3x weekly   Thumb Opposition with Putty - 10 reps - 1 sets - 5 hold - 1x daily - 3x weekly       Subjective:  I think having therapy in [place] might be a good idea    Objective:   Patient is currently discharged from occupational therapy on this date he is 6 weeks postoperative and progressing with his extensor indicates to extensor pollicis longus transfer protocol. It is recommended patient continue with therapy as he is discharged  to go to [place].  Patient will be progressing with his protocol to begin  strengthening in 2 weeks.  Patient has been provided with the above stated exercise program, however I do believe it is appropriate for him to have continued therapy.  Patient is experiencing some increased edema I believe that this is due to increased use with activity.  Patient reports he has been very careful with wearing his brace and trying to limit use of his right hand.    Education and Equipment:   -Thermoplasty thumb opposition splint    Discharge recommendations:   Continue with home exercise program as indicated.  Continue with outpatient occupational therapy to progress with extensor indicis to extensor pollicis longus transfer protocol.    Thank you for allowing us to share in the care of this patient. If you have any questions, recommendations, or further concerns regarding this patient, please feel free to contact us at [phone]      Signed by: JON BOSWORTH, OTR  2/16/2018  10:05 AM

## 2018-02-16 NOTE — PROGRESS NOTES
Subjective   Patient ID: Juan Corona is a 67 y.o. male.    Chief Complaint:  Chief Complaint   Patient presents with   • Left Hip - Follow-up     S/P Left total hip arthroplasty with Dr Strong on 3/21/17.   • Pain     Pain today 0/10. He does have pain when lifting his leg while in a seated position. No meds for pain.      HPI: patient is almost one year post left total hip , has no hip pain although has had some groin pain since his orif of the femur otherwise no concerns, he is going to therapy for the left left fracture     Social History     Occupational History   • Not on file.     Social History Main Topics   • Smoking status: Never Smoker   • Smokeless tobacco: Never Used   • Alcohol use No   • Drug use: No   • Sexual activity: Not on file      Review of Systems   Musculoskeletal: Positive for myalgias. Negative for gait problem.   Neurological: Negative for numbness.             Objective   Left Hip Exam     Tenderness   The patient is experiencing tenderness in the anterior.    Range of Motion   The patient has normal left hip ROM.    Tests   GAMA: negative                    Assessment   Diagnoses and all orders for this visit:    Encounter for postoperative care  -     X-ray hip 2 or more views left    Hip arthritis              Plan    Left hip xray does show total hip implant with no acute change, continue with therapy for the femur fracture, follow up in 2 years with repeat left hip xray

## 2018-02-19 ENCOUNTER — HOSPITAL ENCOUNTER (EMERGENCY)
Facility: HOSPITAL | Age: 68
Discharge: HOME | End: 2018-02-19
Attending: EMERGENCY MEDICINE
Payer: COMMERCIAL

## 2018-02-19 VITALS
SYSTOLIC BLOOD PRESSURE: 121 MMHG | WEIGHT: 170 LBS | RESPIRATION RATE: 16 BRPM | TEMPERATURE: 97.6 F | HEART RATE: 69 BPM | DIASTOLIC BLOOD PRESSURE: 85 MMHG | OXYGEN SATURATION: 94 % | BODY MASS INDEX: 27.32 KG/M2 | HEIGHT: 66 IN

## 2018-02-19 DIAGNOSIS — R60.9 EDEMA: Primary | ICD-10-CM

## 2018-02-19 LAB
ERYTHROCYTE [DISTWIDTH] IN BLOOD BY AUTOMATED COUNT: 15 % (ref 11.5–15)
HCT VFR BLD AUTO: 42.3 % (ref 38–50)
HGB BLD-MCNC: 14.4 G/DL (ref 13.2–17.2)
MCH RBC QN AUTO: 29.3 PG (ref 29–34)
MCHC RBC AUTO-ENTMCNC: 33.9 G/DL (ref 32–36)
MCV RBC AUTO: 86.4 FL (ref 82–97)
PLATELET # BLD AUTO: 130 10*3/UL (ref 130–350)
PMV BLD AUTO: 10.7 FL (ref 6.9–10.8)
RBC # BLD AUTO: 4.9 10*6/ΜL (ref 4.1–5.8)
WBC # BLD AUTO: 6.5 10*3/UL (ref 3.7–9.6)

## 2018-02-19 PROCEDURE — 85027 COMPLETE CBC AUTOMATED: CPT | Performed by: EMERGENCY MEDICINE

## 2018-02-19 PROCEDURE — 36415 COLL VENOUS BLD VENIPUNCTURE: CPT | Performed by: EMERGENCY MEDICINE

## 2018-02-19 PROCEDURE — 36600 WITHDRAWAL OF ARTERIAL BLOOD: CPT | Performed by: EMERGENCY MEDICINE

## 2018-02-19 PROCEDURE — 99282 EMERGENCY DEPT VISIT SF MDM: CPT | Performed by: EMERGENCY MEDICINE

## 2018-02-19 ASSESSMENT — ENCOUNTER SYMPTOMS
FEVER: 0
NUMBNESS: 0

## 2018-02-19 NOTE — DISCHARGE INSTRUCTIONS
Follow-up with orthopedic doctor tomorrow.  I suspect that the swelling is related to perhaps the brace and increased activity.  Do not wear the brace elevate her hand as much as possible activity as tolerated.  Follow-up orthopedic doctor tomorrow as scheduled.    I It's important to remember that if your symptoms worsen, are not improved or change you need to return to the emergency department and we would be very happy to see you again. It's also important that you follow-up with your Dr. for continued care.     Thank you for allowing me to care for you today.  You can call anytime if you have questions.  Ask for me and if I am working I will help you,  if not one of the nurses can help you. [phone]     Dr. Juan Mccormick M.D.

## 2018-02-20 ENCOUNTER — OFFICE VISIT NO LOS (OUTPATIENT)
Dept: ORTHOPEDIC SURGERY | Facility: CLINIC | Age: 68
End: 2018-02-20
Payer: COMMERCIAL

## 2018-02-20 VITALS — BODY MASS INDEX: 28.73 KG/M2 | DIASTOLIC BLOOD PRESSURE: 78 MMHG | SYSTOLIC BLOOD PRESSURE: 104 MMHG | WEIGHT: 178 LBS

## 2018-02-20 DIAGNOSIS — Z47.89 AFTERCARE FOLLOWING SURGERY OF THE MUSCULOSKELETAL SYSTEM: Primary | ICD-10-CM

## 2018-02-20 DIAGNOSIS — S52.552D OTHER CLOSED EXTRA-ARTICULAR FRACTURE OF DISTAL END OF LEFT RADIUS WITH ROUTINE HEALING, SUBSEQUENT ENCOUNTER: ICD-10-CM

## 2018-02-20 DIAGNOSIS — M97.12XD PERIPROSTHETIC FRACTURE AROUND INTERNAL PROSTHETIC LEFT KNEE JOINT, SUBSEQUENT ENCOUNTER: ICD-10-CM

## 2018-02-20 PROCEDURE — 99024 POSTOP FOLLOW-UP VISIT: CPT | Performed by: PHYSICIAN ASSISTANT

## 2018-02-20 ASSESSMENT — PAIN - FUNCTIONAL ASSESSMENT: PAIN_FUNCTIONAL_ASSESSMENT: NO/DENIES PAIN

## 2018-02-20 NOTE — LETTER
[facility]ICS [facility]TS [facility]INE ORTHOPEDIC SURGERY  [address] [place] [address]  [phone]  Dept: [phone]  February 20, 2018     Patient: Juan Corona   YOB: 1950   Date of Visit: 2/20/2018       To Whom It May Concern:    As of 2/20/18, Juan may return to work with the following restrictions: Sedentary work only with LEFT hand. NO pushing/pulling/gripping/lifting greater than 1-2 pounds with LEFT hand. All restrictions in place for 6 weeks.          Sincerely,        SHAUNNA ROSALES    CC: No Recipients

## 2018-02-20 NOTE — ED PROVIDER NOTES
HPI:  Chief Complaint   Patient presents with   • Hand Pain     s/p surgery on 1/5/18, states he started having increased pain last tuesday and some swelling. He states last night the pain got severe and increased swelling and warmth to left hand and wrist area.        67-year-old male complains of swelling of his left hand.  He did have a thumb tendon surgery months ago comes in today because of pain swelling to his hand.  He notices increased pain no redness no fever denies trauma.  He states he has been going to physical therapy and using it.  When I walked in the exam room I see that he has a splint on and I do note that the proximal band seems fairly tight in his midforearm and he notes swelling distal to this on his hand.  He denies any other complaints            HISTORY:  Past Medical History:   Diagnosis Date   • Arthritis    • Coronary artery disease     with stent    • Deep vein thrombosis (CMS/HCC)     in left leg    • GERD (gastroesophageal reflux disease)    • High cholesterol    • History of transfusion    • Hypertension    • PONV (postoperative nausea and vomiting)    • Wears partial dentures     lower        Past Surgical History:   Procedure Laterality Date   • HIP ARTHROPLASTY Left    • JOINT REPLACEMENT     • REPLACEMENT TOTAL KNEE BILATERAL Bilateral    • SHOULDER SURGERY  01/01/2015    RCR   • TENDON REPAIR Left 1/5/2018    Procedure: TENDON TRANSFER OF EXTENSOR INDICIS TO EXTENSOR POLLICIS LONGUS OF LEFT THUMB;  Surgeon: Nabil Wolfe DO;  Location: [facility] OR;  Service: Orthopedics;  Laterality: Left;   • TOTAL HIP ARTHROPLASTY Left 03/21/2017    With Dr. Strong   • TOTAL HIP ARTHROPLASTY  03/21/2017   • WRIST SURGERY         Family History   Problem Relation Age of Onset   • Other Mother      Malignant tumor of breast   • Arthritis Father    • Arthritis Sister    • Arthritis Brother    • Arthritis Maternal Grandmother    • Stroke Paternal Grandmother    • Stroke Paternal Grandfather         Social History   Substance Use Topics   • Smoking status: Never Smoker   • Smokeless tobacco: Never Used   • Alcohol use No       ROS:  Review of Systems   Constitutional: Negative for fever.   Neurological: Negative for numbness.       PE:  ED Triage Vitals [02/19/18 0928]   Temp Heart Rate Resp BP SpO2   36.4 °C (97.6 °F) 69 16 121/85 94 %      Temp Source Heart Rate Source Patient Position BP Location FiO2 (%)   Oral -- -- -- --       Physical Exam   Constitutional: He is oriented to person, place, and time. He appears well-developed and well-nourished.   Pulmonary/Chest: Effort normal.   Musculoskeletal:   Examination of his hand he does have some edema distal to mid forearm slightly warm to the touch not erythematous he has no proximal swelling good distal pulses are noted.  Incision is healed.  He does have moderate pain to palpation.  He has no proximal arm pain swelling whatsoever.  No lymphangitic spread is noted.   Neurological: He is alert and oriented to person, place, and time.   Nursing note and vitals reviewed.      ED LABS:  Labs Reviewed   CBC - Normal       Result Value    WBC 6.5      RBC 4.90      Hemoglobin 14.4      Hematocrit 42.3      MCV 86.4      MCH 29.3      MCHC 33.9      RDW 15.0      Platelets 130      MPV 10.7           ED IMAGES:  No orders to display       ED PROCEDURES:  Procedures    ED COURSE:  ED Course        MDM:  MDM  Number of Diagnoses or Management Options  Edema:   Diagnosis management comments: Did check a CBC CBC was normal.  Dr. Wolfe happy to be available cannot come evaluate the patient briefly evaluated emergency department felt as though there is no significant signs of infection at this time.  Clinically he has edema to his hand and distal forearm just distal to the proximal strap from the splint I suspect the splint is caused some constriction and swelling of the hand.  I will discharge home advised to quit wearing the splint elevate as much as possible he  is scheduled see orthopedist tomorrow encouraging follow-up tomorrow.  On exam I am not concerned that he has a DVT is no proximal arm swelling there is no signs of cellulitis or infection no fever and normal white count and he will be discharged home       Amount and/or Complexity of Data Reviewed  Clinical lab tests: reviewed    Patient Progress  Patient progress: stable      Final diagnoses:   [R60.9] Edema        Juan Mccormick Jr., MD  02/19/18 8435

## 2018-02-21 ASSESSMENT — ENCOUNTER SYMPTOMS
AGITATION: 0
BACK PAIN: 0
ACTIVITY CHANGE: 1
CHILLS: 0
ARTHRALGIAS: 0
FEVER: 0
DIAPHORESIS: 0
FATIGUE: 0
NUMBNESS: 0
WEAKNESS: 1
SLEEP DISTURBANCE: 0
CONFUSION: 0
DECREASED CONCENTRATION: 0
MYALGIAS: 1
DIARRHEA: 0
HALLUCINATIONS: 0
NERVOUS/ANXIOUS: 0
JOINT SWELLING: 0
DYSPHORIC MOOD: 0
CONSTIPATION: 0
WOUND: 0
NAUSEA: 0
COLOR CHANGE: 0
APPETITE CHANGE: 0
VOMITING: 0

## 2018-02-21 NOTE — PATIENT INSTRUCTIONS
-Continue outpatient occupational therapy twice weekly for range of motion and strengthening of the left thumb, hand, and wrist.  -Avoid any lifting, pushing, pulling, grasping of greater than 1-2 pounds the left upper extremity  -Follow-up here in the office in 6 weeks for a 3 month postop visit and recheck of your left thumb.

## 2018-04-03 ENCOUNTER — ANCILLARY PROCEDURE (OUTPATIENT)
Dept: RADIOLOGY | Facility: CLINIC | Age: 68
End: 2018-04-03
Payer: COMMERCIAL

## 2018-04-03 ENCOUNTER — OFFICE VISIT NO LOS (OUTPATIENT)
Dept: ORTHOPEDIC SURGERY | Facility: CLINIC | Age: 68
End: 2018-04-03
Payer: COMMERCIAL

## 2018-04-03 VITALS — WEIGHT: 181 LBS | DIASTOLIC BLOOD PRESSURE: 82 MMHG | BODY MASS INDEX: 29.21 KG/M2 | SYSTOLIC BLOOD PRESSURE: 122 MMHG

## 2018-04-03 DIAGNOSIS — M97.9XXD PERIPROSTHETIC FRACTURE AROUND PROSTHETIC JOINT, SUBSEQUENT ENCOUNTER: Primary | ICD-10-CM

## 2018-04-03 PROCEDURE — 99024 POSTOP FOLLOW-UP VISIT: CPT | Performed by: ORTHOPAEDIC SURGERY

## 2018-04-03 PROCEDURE — 73552 X-RAY EXAM OF FEMUR 2/>: CPT | Performed by: ORTHOPAEDIC SURGERY

## 2018-04-03 RX ORDER — DICLOFENAC SODIUM 10 MG/G
GEL TOPICAL
Qty: 1 TUBE | Refills: 1 | Status: SHIPPED | OUTPATIENT
Start: 2018-04-03 | End: 2019-08-06 | Stop reason: ALTCHOICE

## 2018-04-03 ASSESSMENT — PAIN - FUNCTIONAL ASSESSMENT: PAIN_FUNCTIONAL_ASSESSMENT: 0-10

## 2018-04-03 NOTE — PROGRESS NOTES
Worker's Compensation of the Left Thumb (S/P Left thumb tendon transfer of extensor indices to extensor pollicis longus with Dr Wolfe on 1/5/18. Patient sustained a workman's comp. injury to his left wrist and left distal femur after being struck by a log while working for the [employer] on 8/30/17); Worker's Compensation of the Left Thigh (S/P 1. ORIF of left distal femur periprosthetic fracture. 2. Left distal radius ORIF intraarticular, 2 segments.Aiden/Jim Rangel  8/30/17. Work comp injury from 8/30/17, works for [employer] was struck by a log).); Pain (States he has pain when he lifts his leg. He states 2.5 weeks ago he had a sudden squeezing pain in his thigh while walking up steps. Pain is 7/10 with painful activity. Tylenol for pain as needed. ); and Pain (States he has some difficulty with ROM in his hand. )      HPI:  Mr. Corona returns today to follow up on his left thumb and left distal femur periprosthetic fracture.  He had been doing physical therapa in [place]. There is no pain in the thumb but it does not work well.  He is still having pain in the thigh and also into the groin when he picks his leg up.      Past Medical History:   Diagnosis Date   • Arthritis    • Coronary artery disease     with stent    • Deep vein thrombosis (CMS/HCC)     in left leg    • GERD (gastroesophageal reflux disease)    • High cholesterol    • History of transfusion    • Hypertension    • PONV (postoperative nausea and vomiting)    • Wears partial dentures     lower      Past Surgical History:   Procedure Laterality Date   • HIP ARTHROPLASTY Left    • JOINT REPLACEMENT     • REPLACEMENT TOTAL KNEE BILATERAL Bilateral    • SHOULDER SURGERY  01/01/2015    RCR   • TENDON REPAIR Left 1/5/2018    Procedure: TENDON TRANSFER OF EXTENSOR INDICIS TO EXTENSOR POLLICIS LONGUS OF LEFT THUMB;  Surgeon: Nabil Wolfe DO;  Location: [facility] OR;  Service: Orthopedics;  Laterality: Left;   • TOTAL HIP ARTHROPLASTY Left 03/21/2017    With Dr. Strong    • TOTAL HIP ARTHROPLASTY  03/21/2017   • WRIST SURGERY       Prior to Admission medications    Medication Sig Start Date End Date Taking? Authorizing Provider   ACETAMINOPHEN (TYLENOL ORAL) Take by mouth.   Yes Historical Provider, MD   aspirin 81 mg EC tablet daily.   Yes Historical Provider, MD   atorvastatin (LIPITOR) 20 mg tablet Take 1 tablet (20 mg total) by mouth daily. 2/9/18  Yes Stanley Lopez MD   metoprolol succinate XL (TOPROL-XL) 25 mg 24 hr tablet Take 1 tablet (25 mg total) by mouth daily. 2/9/18  Yes Stanley Lopez MD   ramipril (ALTACE) 2.5 mg capsule Take 1 capsule (2.5 mg total) by mouth daily. 12/6/17  Yes Stanley Lopez MD   UBIDECARENONE (COENZYME Q10 ORAL) coenzyme Q10   Yes Historical Provider, MD     No Known Allergies  Social History     Social History   • Marital status: Single     Spouse name: N/A   • Number of children: N/A   • Years of education: N/A     Occupational History   • Not on file.     Social History Main Topics   • Smoking status: Never Smoker   • Smokeless tobacco: Never Used   • Alcohol use No   • Drug use: No   • Sexual activity: Not on file     Other Topics Concern   • Not on file     Social History Narrative   • No narrative on file     Family History   Problem Relation Age of Onset   • Other Mother      Malignant tumor of breast   • Arthritis Father    • Arthritis Sister    • Arthritis Brother    • Arthritis Maternal Grandmother    • Stroke Paternal Grandmother    • Stroke Paternal Grandfather        /82 (BP Location: Right arm, Patient Position: Sitting, Cuff Size: Large)   Wt 82.1 kg (181 lb)   BMI 29.21 kg/m²     Ortho Exam:  He lacks thumb IP extension.  He has some tenderness over his lateral condyle of his femur.     Assessment/Plan :  Mr. Corona returns today to follow up on his left wrist and left femur fracture.  He is not having pain in his thumb but he does still complain of pain in the thigh.  I reviewed the x-rays of this femur and everything  looks good and is unchanged. I explained to him that this pain should continue to improve.

## 2018-04-03 NOTE — LETTER
[facility]EDICS [facility]TS [facility] ORTHOPEDIC SURGERY  [address] [place] [address]  [phone]  Dept: [phone]  April 3, 2018     Patient: Juan Corona   YOB: 1950   Date of Visit: 4/3/2018       To Whom It May Concern:    It is my medical opinion that Juan Corona may continue work, as of 4/3/18, with the followingrestrictions: : Sedentary work only with LEFT hand. NO pushing/pulling/gripping/lifting greater than 1-2 pounds with LEFT rosenthal. All restrictions in place until changed, or cleared by Orthopedics.     If you have any questions or concerns, please don't hesitate to call.         Sincerely,        RONI RAMIREZ DO    CC: No Recipients

## 2018-04-11 ENCOUNTER — TELEPHONE (OUTPATIENT)
Dept: ORTHOPEDIC SURGERY | Facility: CLINIC | Age: 68
End: 2018-04-11

## 2018-04-11 DIAGNOSIS — M25.552 LEFT HIP PAIN: Primary | ICD-10-CM

## 2018-04-11 NOTE — TELEPHONE ENCOUNTER
Per Dr Wolfe, pt may be schedueld for a tsoas hip tendon injection. Will discuss with pt if he wants this with work comp, or primary insurance. Message left for pt to return call to the clinic.

## 2018-04-11 NOTE — TELEPHONE ENCOUNTER
Pt returned call to clinic and would like injection sent through work comp. Will attempt to re-authorize injection with work comp.

## 2018-04-24 ENCOUNTER — TELEPHONE (OUTPATIENT)
Dept: ORTHOPEDIC SURGERY | Facility: CLINIC | Age: 68
End: 2018-04-24

## 2018-04-24 NOTE — TELEPHONE ENCOUNTER
Spoke with authorization staff and report the injection has been approved.     Called pt and made aware that injection has been approved. He is asking to have Dr Wolfe call him and discuss the injection. Made him aware that he is work comp and work comp requires everything be documented visits. Scheduled pt to be seen to discuss possible injection and injection has been approved.

## 2018-05-15 ENCOUNTER — OFFICE VISIT NO LOS (OUTPATIENT)
Dept: ORTHOPEDIC SURGERY | Facility: CLINIC | Age: 68
End: 2018-05-15
Payer: COMMERCIAL

## 2018-05-15 ENCOUNTER — ANCILLARY PROCEDURE (OUTPATIENT)
Dept: ULTRASOUND IMAGING | Facility: CLINIC | Age: 68
End: 2018-05-15
Payer: COMMERCIAL

## 2018-05-15 VITALS — SYSTOLIC BLOOD PRESSURE: 120 MMHG | BODY MASS INDEX: 29.38 KG/M2 | WEIGHT: 182 LBS | DIASTOLIC BLOOD PRESSURE: 76 MMHG

## 2018-05-15 DIAGNOSIS — M76.12 PSOAS TENDINITIS OF LEFT SIDE: Primary | ICD-10-CM

## 2018-05-15 PROCEDURE — 20611 DRAIN/INJ JOINT/BURSA W/US: CPT | Performed by: ORTHOPAEDIC SURGERY

## 2018-05-15 PROCEDURE — 99214 OFFICE O/P EST MOD 30 MIN: CPT | Mod: 25 | Performed by: ORTHOPAEDIC SURGERY

## 2018-05-15 RX ADMIN — LIDOCAINE HYDROCHLORIDE: 10 INJECTION, SOLUTION EPIDURAL; INFILTRATION; INTRACAUDAL; PERINEURAL at 15:53

## 2018-05-15 RX ADMIN — BUPIVACAINE HYDROCHLORIDE: 5 INJECTION, SOLUTION EPIDURAL; INTRACAUDAL at 15:53

## 2018-05-15 RX ADMIN — BETAMETHASONE SODIUM PHOSPHATE AND BETAMETHASONE ACETATE 6 MG: 3; 3 INJECTION, SUSPENSION INTRA-ARTICULAR; INTRALESIONAL; INTRAMUSCULAR; SOFT TISSUE at 15:53

## 2018-05-15 ASSESSMENT — PAIN - FUNCTIONAL ASSESSMENT: PAIN_FUNCTIONAL_ASSESSMENT: NO/DENIES PAIN

## 2018-05-15 NOTE — PROGRESS NOTES
Injection/Arthrocentesis Joint/Tendon/Bursa  Date/Time: 5/15/2018 3:53 PM  Performed by: RONI RAMIREZ       Consent  Verbal consent was obtained from the patient. Written consent was not obtained from the patient. Risks, benefits, and alternatives were discussed. Consent given by patient. The patient states understanding of the procedure being performed. Patient ID confirmed verbally with patient.       Location Details  An injection was given to Juan in his hip (Psoas tendon). The injection site was the L greater trochanteric bursa.    Procedure Details   Patient was prepped and draped in the usual sterile fashion. Ethyl chloride spray was used for local anesthesia. A gauge needle was inserted into the L greater trochanteric bursa using a anterior approach under ultrasound guidance. L greater trochanteric bursa was injected with 6 mg betamethasone acet,sod phos 6 mg/mL; bupivacaine (PF) 0.5 % (5 mg/mL); lidocaine PF 10 mg/mL (1 %). Needle removed without complication.     Post-Procedure  Patient tolerated the procedure well with no immediate complications. A standard bandage was applied. Advised patient to avoid strenous activity for 24-48 hours. Advised patient to use ice, NSAIDs or acetaminophen for pain as needed.     Procedure Comments  12mg Celestone  4mL 0.5% marcaine  4mL 1% lidocaine

## 2018-05-16 RX ORDER — BETAMETHASONE SODIUM PHOSPHATE AND BETAMETHASONE ACETATE 3; 3 MG/ML; MG/ML
6 INJECTION, SUSPENSION INTRA-ARTICULAR; INTRALESIONAL; INTRAMUSCULAR; SOFT TISSUE
Status: DISCONTINUED | OUTPATIENT
Start: 2018-05-15 | End: 2020-02-05 | Stop reason: WASHOUT

## 2018-05-16 RX ORDER — BUPIVACAINE HYDROCHLORIDE 5 MG/ML
INJECTION, SOLUTION EPIDURAL; INTRACAUDAL
Status: DISCONTINUED | OUTPATIENT
Start: 2018-05-15 | End: 2020-02-05 | Stop reason: WASHOUT

## 2018-05-16 RX ORDER — LIDOCAINE HYDROCHLORIDE 10 MG/ML
INJECTION, SOLUTION EPIDURAL; INFILTRATION; INTRACAUDAL; PERINEURAL
Status: DISCONTINUED | OUTPATIENT
Start: 2018-05-15 | End: 2020-02-05 | Stop reason: WASHOUT

## 2018-05-16 NOTE — PROGRESS NOTES
Injections (Presents psoas hip tendon injection. Worker's Compensation of the Left Thumb (S/P Left thumb tendon transfer of extensor indices to extensor pollicis longus with Dr Wolfe on 1/5/18. Patient sustained a workman's comp. injury to his left wrist and left distal femur after being struck by a log while working for the [employer] on 8/30/17); Worker's Compensation of the Left Thigh (S/P 1. ORIF of left distal femur periprosthetic fracture. 2. Left distal radius ORIF intraarticular, 2 segments.Aiden/Jim Huff) and Injections (Pain level rates 0/10 today.  He does not take anything for pain. )      HPI:  Mr. Corona returns today to follow up on his left hip and left distal femur periprosthetic fracture.  He had been doing physical therapa in [place].   He is still having pain in the thigh and also into the groin when he picks his leg up.  Denies any other symptoms or issues at this time.  No change from previous visit including no improvement.    Past Medical History:   Diagnosis Date   • Arthritis    • Coronary artery disease     with stent    • Deep vein thrombosis (CMS/HCC)     in left leg    • GERD (gastroesophageal reflux disease)    • High cholesterol    • History of transfusion    • Hypertension    • PONV (postoperative nausea and vomiting)    • Wears partial dentures     lower      Past Surgical History:   Procedure Laterality Date   • HIP ARTHROPLASTY Left    • JOINT REPLACEMENT     • REPLACEMENT TOTAL KNEE BILATERAL Bilateral    • SHOULDER SURGERY  01/01/2015    RCR   • TENDON REPAIR Left 1/5/2018    Procedure: TENDON TRANSFER OF EXTENSOR INDICIS TO EXTENSOR POLLICIS LONGUS OF LEFT THUMB;  Surgeon: Nabil Wolfe DO;  Location: [facility]  Service: Orthopedics;  Laterality: Left;   • TOTAL HIP ARTHROPLASTY Left 03/21/2017    With Dr. Strong   • TOTAL HIP ARTHROPLASTY  03/21/2017   • WRIST SURGERY       Prior to Admission medications    Medication Sig Start Date End Date Taking? Authorizing Provider    ACETAMINOPHEN (TYLENOL ORAL) Take by mouth.   Yes Historical Provider, MD   aspirin 81 mg EC tablet daily.   Yes Historical Provider, MD   atorvastatin (LIPITOR) 20 mg tablet Take 1 tablet (20 mg total) by mouth daily. 2/9/18  Yes Stanley Lopez MD   metoprolol succinate XL (TOPROL-XL) 25 mg 24 hr tablet Take 1 tablet (25 mg total) by mouth daily. 2/9/18  Yes Stanley Lopez MD   ramipril (ALTACE) 2.5 mg capsule Take 1 capsule (2.5 mg total) by mouth daily. 12/6/17  Yes Stanley Lopez MD   UBIDECARENONE (COENZYME Q10 ORAL) coenzyme Q10   Yes Historical Provider, MD     No Known Allergies  Social History     Social History   • Marital status: Single     Spouse name: N/A   • Number of children: N/A   • Years of education: N/A     Occupational History   • Not on file.     Social History Main Topics   • Smoking status: Never Smoker   • Smokeless tobacco: Never Used   • Alcohol use No   • Drug use: No   • Sexual activity: Not on file     Other Topics Concern   • Not on file     Social History Narrative   • No narrative on file     Family History   Problem Relation Age of Onset   • Other Mother      Malignant tumor of breast   • Arthritis Father    • Arthritis Sister    • Arthritis Brother    • Arthritis Maternal Grandmother    • Stroke Paternal Grandmother    • Stroke Paternal Grandfather        /76 (BP Location: Right arm, Patient Position: Sitting, Cuff Size: Reg)   Wt 82.6 kg (182 lb)   BMI 29.38 kg/m²     Ortho Exam:  He lacks thumb IP extension.  He has some tenderness over his lateral condyle of his femur.     Assessment/Plan :  Mr. Corona returns today to follow up on his left wrist and left femur fracture well as some increasing left hip pain.  I did discuss with Dr. Strong regarding his total hip arthroplasty and he and I are in agreement that iliopsoas injection may give him some relief.  We perform that in the office today and he tolerated that well.  I explained to him that this pain should continue  to improve.  Hopefully he will get lasting improvement.  If he gets short-term improvement I would like him to follow-up for his total hip arthroplasty.   S/p BKA, vascular following.

## 2018-08-15 ENCOUNTER — ANCILLARY PROCEDURE (OUTPATIENT)
Dept: RADIOLOGY | Facility: CLINIC | Age: 68
End: 2018-08-15
Payer: COMMERCIAL

## 2018-08-15 ENCOUNTER — OFFICE VISIT (OUTPATIENT)
Dept: ORTHOPEDIC SURGERY | Facility: CLINIC | Age: 68
End: 2018-08-15
Payer: COMMERCIAL

## 2018-08-15 VITALS — WEIGHT: 181 LBS | BODY MASS INDEX: 29.21 KG/M2 | SYSTOLIC BLOOD PRESSURE: 138 MMHG | DIASTOLIC BLOOD PRESSURE: 84 MMHG

## 2018-08-15 DIAGNOSIS — S66.212D STRAIN OF EXTENSOR MUSCLE, FASCIA AND TENDON OF LEFT THUMB AT WRIST AND HAND LEVEL, SUBSEQUENT ENCOUNTER: ICD-10-CM

## 2018-08-15 DIAGNOSIS — M25.562 CHRONIC PAIN OF LEFT KNEE: ICD-10-CM

## 2018-08-15 DIAGNOSIS — Z47.89 AFTERCARE FOLLOWING SURGERY OF THE MUSCULOSKELETAL SYSTEM: ICD-10-CM

## 2018-08-15 DIAGNOSIS — M97.9XXD PERIPROSTHETIC FRACTURE AROUND PROSTHETIC JOINT, SUBSEQUENT ENCOUNTER: Primary | ICD-10-CM

## 2018-08-15 DIAGNOSIS — S52.552D OTHER CLOSED EXTRA-ARTICULAR FRACTURE OF DISTAL END OF LEFT RADIUS WITH ROUTINE HEALING, SUBSEQUENT ENCOUNTER: ICD-10-CM

## 2018-08-15 DIAGNOSIS — G89.29 CHRONIC PAIN OF LEFT KNEE: ICD-10-CM

## 2018-08-15 DIAGNOSIS — Z96.652 HISTORY OF TOTAL KNEE ARTHROPLASTY, LEFT: ICD-10-CM

## 2018-08-15 PROCEDURE — 73552 X-RAY EXAM OF FEMUR 2/>: CPT | Mod: LT,PO

## 2018-08-15 PROCEDURE — 99214 OFFICE O/P EST MOD 30 MIN: CPT | Mod: PO | Performed by: PHYSICIAN ASSISTANT

## 2018-08-15 PROCEDURE — 73564 X-RAY EXAM KNEE 4 OR MORE: CPT | Mod: LT,PO

## 2018-08-15 PROCEDURE — 73552 X-RAY EXAM OF FEMUR 2/>: CPT | Mod: LT | Performed by: ORTHOPAEDIC SURGERY

## 2018-08-15 PROCEDURE — 73564 X-RAY EXAM KNEE 4 OR MORE: CPT | Mod: LT | Performed by: ORTHOPAEDIC SURGERY

## 2018-08-15 PROCEDURE — 99214 OFFICE O/P EST MOD 30 MIN: CPT | Performed by: PHYSICIAN ASSISTANT

## 2018-08-15 ASSESSMENT — ENCOUNTER SYMPTOMS
AGITATION: 0
FATIGUE: 0
NAUSEA: 0
WOUND: 0
COLOR CHANGE: 0
CONSTIPATION: 0
VOMITING: 0
DECREASED CONCENTRATION: 0
CONFUSION: 0
NERVOUS/ANXIOUS: 0
FEVER: 0
JOINT SWELLING: 0
APPETITE CHANGE: 0
DIAPHORESIS: 0
SLEEP DISTURBANCE: 0
DYSPHORIC MOOD: 0
DIARRHEA: 0
BACK PAIN: 0
ACTIVITY CHANGE: 1
ARTHRALGIAS: 0
CHILLS: 0
HALLUCINATIONS: 0
WEAKNESS: 1
NUMBNESS: 0
MYALGIAS: 1

## 2018-08-15 ASSESSMENT — PAIN - FUNCTIONAL ASSESSMENT: PAIN_FUNCTIONAL_ASSESSMENT: NO/DENIES PAIN

## 2018-08-15 NOTE — LETTER
[facility]  [address] [place] [address]  [phone]  Dept: [phone]  August 15, 2018     Patient: Juan Corona   YOB: 1950   Date of Visit: 8/15/2018       To Whom It May Concern:    It is my medical opinion that Juan Corona may work with the following restrictions: No kneeling, stooping, avoid ladders and frequent stair use. May lift as tolerated with upper extremities.    If you have any questions or concerns, please don't hesitate to call.         Sincerely,        SHAUNNA ROSALES    CC: No Recipients

## 2018-08-15 NOTE — PROGRESS NOTES
Subjective   Patient ID: Juan Corona is a 68 y.o. male.    Chief Complaint:  Chief Complaint   Patient presents with   • Follow-up     F/U; Worker's Compensation of the Left Thumb (S/P Left thumb tendon transfer of extensor indices to extensor pollicis longus with Dr Wolfe on 1/5/18. Patient sustained a workman's comp. injury to his left wrist and left distal femur after being struck by a log while working for the [employer] on 8/30/17); Worker's Compensation of the Left Thigh (S/P 1. ORIF of left distal femur periprosthetic fracture. 2. Left distal radius ORIF intraarticular).    • Hip Pain     F/U L hip pain; Received a L hip injection on 5/15/18.  States the hip injection did not help very much. Pain level rates 0/10. Takes Tylenol as needed for pain.    • Worker's Compensation     States he was having some pain with the plate put in his thigh; concerned it may need removed. Concerned he doesn't have full ROM in that leg. States his thumb feels a lot better; concerned about how much he can lift. Pain level rates 0/10. Takes Tylenol as needed for pain.       LALA Dejesus is a pleasant 68-year-old man who comes in today, with Charanjit from Workmen's Compensation, for an 8 month postop visit after having a left thumb tendon transfer of the extensor indices to extensor pollicis longus by Dr. Nabil Wolfe on 1/5/2018 in [place], [place] and a 1 year follow-up visit on his ORIF of the left distal femur from a periprosthetic fracture and ORIF of the left distal radius with Dr. Nabil Wolfe on 8/30/2017 in [place], [place].    Patient sustained a workman's comp. injury to his left wrist and left distal femur after being struck by a log while working for the [employer] on 8/30/2017.    Patient is currently rating his pain today at a 0/10.  He is taking nothing orally for pain.  Patient denies having any fevers, chills, night sweats, erythema, warmth, or drainage from his incisions over the left thumb or wrist.  Patient denies any  numbness/tingling or paresthesias the left upper extremity.  He continues to have weakness with extension of his IP joint of the left thumb.  This is not improved over the past 4 months.  He is having problems with with ADL's due to the lack of  and pincer strength.  Patient denies having much problems at all with his left wrist.  He has normal range of motion in all planes but does describe some soreness with heavy lifting with supination.  He has not been back to work since his injury on 8/30/2017.  He is voicing no other complaints with his left thumb or wrist.      Patient is currently rating his pain today at a 0/10. He is taking Tylenol orally for pain for his left thigh.  Patient denies any new injury or trauma to his left leg. Patient denies any numbness/tingling or paresthesias of the left lower extremity. He denies any pain over the left femur with ambulation. He does note some pain in the left groin with active flexion of the left hip.  He is ambulating without the use of an assisted device. He has pain over the plate laterally over the distal femur.  This is aggravated by walking and standing.  He has intermittent limping due to pain.  He is voicing no other complaints at this time.  Overall, he has shown some slow progress with his left distal femur.  There has been discussion about removal of hardware of the left distal femur by Dr. Wolfe.    Patient has a iliopsoas injection into the left hip on 5/15/2018 under ultrasound guidance with Dr. Nabil Wolfe.  This gave him minimal to no improvement of his hip pain.  Patient had an elective left total hip arthroplasty with Dr. Gm Strong on 3/21/2017.  Patient was doing well from his surgery up until his fracture.  Social History     Occupational History   • Not on file.     Social History Main Topics   • Smoking status: Never Smoker   • Smokeless tobacco: Never Used   • Alcohol use No   • Drug use: No   • Sexual activity: Not on file      Review of  Systems   Constitutional: Positive for activity change. Negative for appetite change, chills, diaphoresis, fatigue and fever.   Gastrointestinal: Negative for constipation, diarrhea, nausea and vomiting.   Musculoskeletal: Positive for myalgias. Negative for arthralgias, back pain, gait problem and joint swelling.   Skin: Negative for color change, pallor, rash and wound.   Neurological: Positive for weakness. Negative for numbness.   Psychiatric/Behavioral: Negative for agitation, behavioral problems, confusion, decreased concentration, dysphoric mood, hallucinations and sleep disturbance. The patient is not nervous/anxious.              Objective   Ortho Exam        Oleg is a pleasant 67-year-old man who looks his stated age.  He is alert and cooperative in no acute distress secondary to pain.  Patient is well nourished and appropriately dressed.  Gait is normal.     Left thumb examination:   Incisions were examined showing no erythema, warmth, swelling, or drainage.  Patient is unable able to hold his IP joint of the left thumb in  extension against light manual resistance today.   Patient is neurovascularly intact the left upper extremity.  He has normal active range of motion of the left elbow.  Patient's  strength is 4+/5 in the left hand.      Left wrist examination:  Incision is healing well over the left volar wrist. There is no erythema, warmth, swelling, or drainage appreciated. Patient is neurovascularly intact in the left upper extremity. Patient has normal active range of motion of the left elbow.  Active range of motion of the left wrist is 45 degrees of flexion, 60 degrees of extension, 80° of supination, and 70° of pronation.   strength is 4+/5, flexion/extension of the left wrist is 5/5 strength.     Left hip/knee examination:  Incisions are well-healed over the left lateral knee and thigh. There is no erythema, warmth, swelling, or drainage appreciated. Patient is neurovascularly intact the  left lower extremity with a negative Homans sign bilaterally. Passive range of motion of the left knee is from terminal extension up through 110° of flexion.  Left knee is actually 0-100° of flexion.  Collateral ligaments are stable at 0 and 30° of flexion with valgus/varus stressing. There is atrophy noted over the left quadriceps.  Patient has no pain with internal/external rotation of the left hip in a supine or seated position.  He has 5/5 strength with hip adduction and abduction.  Patient has 4/5 strength with hip flexion of the left hip against manual resistance.  Patient has pain to palpation over the lateral distal femur over his plate under the IT band.    Assessment   Diagnoses and all orders for this visit:    Periprosthetic fracture around prosthetic joint, subsequent encounter  Comments:  Left distal femur fracture, proximal to Left TKA from workman's comp. injury on 8/30/2017  Orders:  -     X-ray femur 2 views left  -     X-ray knee 4 or more views left    Other closed extra-articular fracture of distal end of left radius with routine healing, subsequent encounter  Comments:  Closed left distal radius and ulna styloid fracture from workman's comp. injury on 8/30/2017    Aftercare following surgery of the musculoskeletal system  Comments:  Left thumb tendon transfer of the extensor indices to extensor pollicis longus by Dr. Nabil Wolfe on 1/5/2018    Strain of extensor muscle, fascia and tendon of left thumb at wrist and hand level, subsequent encounter  Comments:  rupture of EPL on 9/8/2017 of the left thumb    Chronic pain of left knee    History of total knee arthroplasty, left  Comments:  Left TKA in [place] 10 years ago              Plan    X-rays were accomplished of the left knee on 8/14/2018 showing a prior left knee arthroplasty.  There is a supracondylar femur fracture with plate and screws present laterally.  This appears to be well-healed.  There is a large osteophytes present anteriorly  over the left knee and over the posterior aspect of the left knee in the lateral view.  There is been no change in overall alignment of his fracture.    X-rays were accomplished of the left femur showing a left hip arthroplasty in excellent position in the AP and lateral views.  There is a left total knee arthroplasty with prior ORIF of the distal femur in satisfactory alignment.    Today, Oleg continues to have pain in his left distal femur.  This has slowly improved over time.  He is having more pain in his left hip which is not related to his prior work comp injury.      I have recommended that he follow-up with Dr. Strong who did his total hip arthroplasty for further evaluation and input on his treatment.  Patient underwent a steroid injection iliopsas  with Dr. Wolfe on 5/15/2018 with minimal improvement for his anterior left hip pain.  Patient voices understanding we will make an appointment on 8/22/2018 here in the office.    Patient is one year out from his ORIF of his left distal femur.  He continues to slowly improve and I do not believe he has reached his maximum medical improvement from his injury due to his ongoing pain, likely related from his plate and screws laterally.  I would wait another 3 months and see if symptoms improve with his left distal femur and knee.  Patient will be relocating to [place] and will need to follow-up with an orthopedic surgeon in late November 2018 for reevaluation and new x-rays and possible discussion of hardware removal of the distal femur if not improving.  Patient does not have enough time to heal up from a hardware removal surgery here in [place] prior to his moving to [place].    With regards to his ORIF of the left distal radius from 8/30/2018 and left thumb tendon transfer of the extensor indices to extensor pollicis longus by Dr. Nabil Wolfe on 1/5/2018, I believe that Oleg has reached maximum medical improvement from both of these surgeries.  Patient will be  set up for an impairment rating after he reaches maximum medical improvement from his left femur hopefully in 3 months for all 3 problems.  This may be set up in [place] per Workmen's Compensation.    Total time spent with patient today was 25 minutes of greater than 50% were discussion of his left distal femur pain, left hip pain, physical exam, review of x-rays, and discussion of conservative treatments and MMI.

## 2018-08-20 NOTE — PATIENT INSTRUCTIONS
-Follow-up with an orthopedic surgeon in [place] in 3 months for 15 month postop visit and recheck of your left distal thigh and discussion possible hardware removal at that time and maximum medical improvement from your fracture of your left femur  -Continue with your current work restrictions  -Follow-up here in the office on an as-needed basis

## 2018-08-22 ENCOUNTER — OFFICE VISIT (OUTPATIENT)
Dept: ORTHOPEDIC SURGERY | Facility: CLINIC | Age: 68
End: 2018-08-22
Payer: COMMERCIAL

## 2018-08-22 ENCOUNTER — TELEPHONE (OUTPATIENT)
Dept: ORTHOPEDIC SURGERY | Facility: CLINIC | Age: 68
End: 2018-08-22

## 2018-08-22 VITALS — BODY MASS INDEX: 29.05 KG/M2 | DIASTOLIC BLOOD PRESSURE: 64 MMHG | SYSTOLIC BLOOD PRESSURE: 114 MMHG | WEIGHT: 180 LBS

## 2018-08-22 DIAGNOSIS — M97.9XXD PERIPROSTHETIC FRACTURE AROUND PROSTHETIC JOINT, SUBSEQUENT ENCOUNTER: Primary | ICD-10-CM

## 2018-08-22 PROBLEM — M25.562 CHRONIC PAIN OF LEFT KNEE: Status: ACTIVE | Noted: 2018-08-22

## 2018-08-22 PROBLEM — M97.9XXA: Status: ACTIVE | Noted: 2018-08-22

## 2018-08-22 PROBLEM — Z96.652 HISTORY OF TOTAL KNEE ARTHROPLASTY, LEFT: Status: ACTIVE | Noted: 2018-08-22

## 2018-08-22 PROBLEM — S52.502D CLOSED FRACTURE OF LOWER END OF LEFT RADIUS WITH ROUTINE HEALING: Status: ACTIVE | Noted: 2018-08-22

## 2018-08-22 PROBLEM — G89.29 CHRONIC PAIN OF LEFT KNEE: Status: ACTIVE | Noted: 2018-08-22

## 2018-08-22 PROCEDURE — 99213 OFFICE O/P EST LOW 20 MIN: CPT | Performed by: ORTHOPAEDIC SURGERY

## 2018-08-22 ASSESSMENT — ENCOUNTER SYMPTOMS
VOMITING: 0
ARTHRALGIAS: 1
COUGH: 0
EYE PAIN: 0
AGITATION: 0
CHILLS: 0
HEMATURIA: 0
FEVER: 0
PALPITATIONS: 0
SEIZURES: 0
SORE THROAT: 0
DYSURIA: 0
SHORTNESS OF BREATH: 0
ABDOMINAL PAIN: 0
COLOR CHANGE: 0
BACK PAIN: 0

## 2018-08-22 ASSESSMENT — PAIN - FUNCTIONAL ASSESSMENT: PAIN_FUNCTIONAL_ASSESSMENT: 0-10

## 2018-08-22 NOTE — PROGRESS NOTES
Chief Complaint   Patient presents with   • Follow-up     F/u left total hip arthroplasty with Dr. Gm Strong on 3/21/2017. Patient underwent a steroid injection iliopsas  with Dr. Wolfe on 5/15/2018 with minimal improvement for his anterior left hip pain.  Biggest concern is he is unble to lift from the hip.  Pain level rates 8/10 with lifting.        HPI  This 68-year-old male seen for a follow-up on left hip pain.  He did receive a left iliopsoas tendon injection which she states gave fairly good relief however this was very short-lived.  The pain that he is experiencing now was not there prior to his work injury.    Past Medical History:   Diagnosis Date   • Arthritis    • Coronary artery disease     with stent    • Deep vein thrombosis (CMS/HCC)     in left leg    • GERD (gastroesophageal reflux disease)    • High cholesterol    • History of transfusion    • Hypertension    • PONV (postoperative nausea and vomiting)    • Wears partial dentures     lower      Past Surgical History:   Procedure Laterality Date   • HIP ARTHROPLASTY Left    • JOINT REPLACEMENT     • REPLACEMENT TOTAL KNEE BILATERAL Bilateral    • SHOULDER SURGERY  01/01/2015    RCR   • TENDON REPAIR Left 1/5/2018    Procedure: TENDON TRANSFER OF EXTENSOR INDICIS TO EXTENSOR POLLICIS LONGUS OF LEFT THUMB;  Surgeon: Nabil Wolfe DO;  Location: [facility];  Service: Orthopedics;  Laterality: Left;   • TOTAL HIP ARTHROPLASTY Left 03/21/2017    With Dr. Strong   • TOTAL HIP ARTHROPLASTY  03/21/2017   • WRIST SURGERY       Prior to Admission medications    Medication Sig Start Date End Date Taking? Authorizing Provider   ACETAMINOPHEN (TYLENOL ORAL) Take by mouth.    Historical Provider, MD   aspirin 81 mg EC tablet daily.    Historical Provider, MD   atorvastatin (LIPITOR) 20 mg tablet Take 1 tablet (20 mg total) by mouth daily. 2/9/18   Stanley Lopez MD   diclofenac sodium (VOLTAREN) 1 % gel Apply to affected area as directed  Patient not taking: Reported  on 8/15/2018  4/3/18   Nabil Wolfe DO   metoprolol succinate XL (TOPROL-XL) 25 mg 24 hr tablet Take 1 tablet (25 mg total) by mouth daily. 2/9/18   Stanley Lopez MD   ramipril (ALTACE) 2.5 mg capsule Take 1 capsule (2.5 mg total) by mouth daily. 12/6/17   Stanley Lopez MD   UBIDECARENONE (COENZYME Q10 ORAL) coenzyme Q10    Historical Provider, MD     No Known Allergies  Social History     Social History   • Marital status: Single     Spouse name: N/A   • Number of children: N/A   • Years of education: N/A     Occupational History   • Not on file.     Social History Main Topics   • Smoking status: Never Smoker   • Smokeless tobacco: Never Used   • Alcohol use No   • Drug use: No   • Sexual activity: Not on file     Other Topics Concern   • Not on file     Social History Narrative   • No narrative on file     Family History   Problem Relation Age of Onset   • Other Mother         Malignant tumor of breast   • Arthritis Father    • Arthritis Sister    • Arthritis Brother    • Arthritis Maternal Grandmother    • Stroke Paternal Grandmother    • Stroke Paternal Grandfather        /64 (BP Location: Right arm, Patient Position: Sitting, Cuff Size: Reg)   Wt 81.6 kg (180 lb)   BMI 29.05 kg/m²   Review of Systems   Constitutional: Negative for chills and fever.   HENT: Negative for ear pain and sore throat.    Eyes: Negative for pain and visual disturbance.   Respiratory: Negative for cough and shortness of breath.    Cardiovascular: Negative for chest pain and palpitations.   Gastrointestinal: Negative for abdominal pain and vomiting.   Genitourinary: Negative for dysuria and hematuria.   Musculoskeletal: Positive for arthralgias and gait problem. Negative for back pain.   Skin: Negative for color change and rash.   Neurological: Negative for seizures and syncope.   Psychiatric/Behavioral: Negative for agitation and behavioral problems.   All other systems reviewed and are negative.    Physical Exam    Constitutional: He is oriented to person, place, and time. He appears well-developed and well-nourished. No distress.   HENT:   Head: Normocephalic and atraumatic.   Eyes: Right eye exhibits no discharge. Left eye exhibits no discharge. No scleral icterus.   Neck: Normal range of motion. Neck supple.   Cardiovascular: Normal rate and regular rhythm.    Pulmonary/Chest: No respiratory distress. He has no wheezes.   Musculoskeletal: He exhibits tenderness. He exhibits no edema or deformity.   Neurological: He is alert and oriented to person, place, and time.   Skin: Skin is warm and dry. Capillary refill takes less than 2 seconds. No rash noted. He is not diaphoretic. No erythema. No pallor.   Psychiatric: He has a normal mood and affect. His behavior is normal.   Nursing note and vitals reviewed.    Ortho Exam  Pain with active flexion of the left hip.  No pain with passive flexion of the left hip.  Pain with resisted adduction of the left hip.  Assessment/Plan   Left iliopsoas tendinitis.  He was not having any of these symptoms prior to his work injury.  My recommendation is left iliopsoas tendon release.

## 2018-08-23 NOTE — TELEPHONE ENCOUNTER
Paulette spoke with work comp and they stated that they would review the notes that have been submitted and let us know what WC will decide about paying for the appointment yesterday, 8/22/18. I informed Juan of what Dr. Selby note states as a plan and he verbalized understanding.

## 2018-11-02 ENCOUNTER — HOSPITAL (OUTPATIENT)
Dept: OTHER | Age: 68
End: 2018-11-02
Attending: INTERNAL MEDICINE

## 2018-11-02 LAB
ALT SERPL-CCNC: 42 UNIT/L
ANION GAP SERPL CALC-SCNC: 16 MMOL/L (ref 10–20)
AST SERPL-CCNC: 25 UNIT/L
BUN SERPL-MCNC: 15 MG/DL (ref 6–20)
BUN/CREAT SERPL: 26 (ref 7–25)
CALCIUM SERPL-MCNC: 9.3 MG/DL (ref 8.4–10.2)
CHLORIDE: 105 MMOL/L (ref 98–107)
CHOLEST SERPL-MCNC: 129 MG/DL
CHOLEST/HDLC SERPL: 3 {RATIO}
CO2 SERPL-SCNC: 24 MMOL/L (ref 21–32)
CREAT SERPL-MCNC: 0.58 MG/DL (ref 0.67–1.17)
GLUCOSE SERPL-MCNC: 95 MG/DL (ref 65–99)
HDLC SERPL-MCNC: 43 MG/DL
LDLC SERPL CALC-MCNC: 43 MG/DL
NONHDLC SERPL-MCNC: 86 MG/DL
POTASSIUM SERPL-SCNC: 5 MMOL/L (ref 3.4–5.1)
SODIUM SERPL-SCNC: 140 MMOL/L (ref 135–145)
TRIGLYCERIDE (TRIGP): 214 MG/DL

## 2018-12-01 ENCOUNTER — PRIOR ORIGINAL RECORDS (OUTPATIENT)
Dept: HEALTH INFORMATION MANAGEMENT | Facility: OTHER | Age: 68
End: 2018-12-01

## 2019-01-01 ENCOUNTER — EXTERNAL RECORD (OUTPATIENT)
Dept: HEALTH INFORMATION MANAGEMENT | Facility: OTHER | Age: 69
End: 2019-01-01

## 2019-01-17 DIAGNOSIS — E78.49 OTHER HYPERLIPIDEMIA: Primary | ICD-10-CM

## 2019-01-17 DIAGNOSIS — I10 ESSENTIAL HYPERTENSION: ICD-10-CM

## 2019-01-17 DIAGNOSIS — I49.3 PVC'S (PREMATURE VENTRICULAR CONTRACTIONS): ICD-10-CM

## 2019-01-17 DIAGNOSIS — I25.10 CORONARY ARTERY DISEASE INVOLVING NATIVE CORONARY ARTERY OF NATIVE HEART WITHOUT ANGINA PECTORIS: ICD-10-CM

## 2019-01-17 DIAGNOSIS — E78.5 DYSLIPIDEMIA: ICD-10-CM

## 2019-01-17 RX ORDER — METOPROLOL SUCCINATE 25 MG/1
25 TABLET, EXTENDED RELEASE ORAL DAILY
Qty: 30 TABLET | Refills: 0 | Status: SHIPPED | OUTPATIENT
Start: 2019-01-17 | End: 2022-11-14 | Stop reason: ALTCHOICE

## 2019-01-17 RX ORDER — RAMIPRIL 2.5 MG/1
2.5 CAPSULE ORAL DAILY
Qty: 30 CAPSULE | Refills: 0 | Status: SHIPPED | OUTPATIENT
Start: 2019-01-17 | End: 2023-05-01 | Stop reason: SDUPTHER

## 2019-01-17 RX ORDER — ATORVASTATIN CALCIUM 20 MG/1
20 TABLET, FILM COATED ORAL EVERY 24 HOURS
OUTPATIENT
Start: 2019-01-17

## 2019-01-17 NOTE — TELEPHONE ENCOUNTER
Spoke with patient r/t filling statin. Patient told [place] all drugs to be filled at new pharmacy. Patient to call pharmacy in [place] for clarification.

## 2019-01-21 DIAGNOSIS — E78.5 DYSLIPIDEMIA: ICD-10-CM

## 2019-01-21 DIAGNOSIS — I49.3 PVC'S (PREMATURE VENTRICULAR CONTRACTIONS): ICD-10-CM

## 2019-01-22 RX ORDER — ATORVASTATIN CALCIUM 20 MG/1
20 TABLET, FILM COATED ORAL EVERY 24 HOURS
OUTPATIENT
Start: 2019-01-22

## 2019-01-22 RX ORDER — METOPROLOL SUCCINATE 25 MG/1
25 TABLET, EXTENDED RELEASE ORAL DAILY
OUTPATIENT
Start: 2019-01-22

## 2019-02-11 ENCOUNTER — HOSPITAL (OUTPATIENT)
Dept: OTHER | Age: 69
End: 2019-02-11

## 2019-02-11 LAB
ANALYZER ANC (IANC): ABNORMAL
ANALYZER ANC (IANC): ABNORMAL
BASOPHILS # BLD: 0 K/MCL (ref 0–0.3)
BASOPHILS # BLD: 0 THOUSAND/MCL (ref 0–0.3)
BASOPHILS NFR BLD: 0 %
BASOPHILS NFR BLD: 0 %
DIFFERENTIAL METHOD BLD: ABNORMAL
DIFFERENTIAL METHOD BLD: ABNORMAL
EOSINOPHIL # BLD: 0.2 K/MCL (ref 0.1–0.5)
EOSINOPHIL # BLD: 0.2 THOUSAND/MCL (ref 0.1–0.5)
EOSINOPHIL NFR BLD: 3 %
EOSINOPHIL NFR BLD: 3 %
ERYTHROCYTE [DISTWIDTH] IN BLOOD: 12.9 % (ref 11–15)
ERYTHROCYTE [DISTWIDTH] IN BLOOD: 12.9 % (ref 11–15)
ERYTHROCYTE [SEDIMENTATION RATE] IN BLOOD BY WESTERGREN METHOD: 5 MM/HR (ref 0–20)
ERYTHROCYTE [SEDIMENTATION RATE] IN BLOOD BY WESTERGREN METHOD: 5 MM/HR (ref 0–20)
HCT VFR BLD CALC: 44.2 % (ref 39–51)
HEMATOCRIT: 44.2 % (ref 39–51)
HGB BLD-MCNC: 15.1 G/DL (ref 13–17)
HGB BLD-MCNC: 15.1 GM/DL (ref 13–17)
IMM GRANULOCYTES # BLD AUTO: 0 K/MCL (ref 0–0.2)
IMM GRANULOCYTES # BLD AUTO: 0 THOUSAND/MCL (ref 0–0.2)
IMM GRANULOCYTES NFR BLD: 0 %
IMM GRANULOCYTES NFR BLD: 0 %
LYMPHOCYTES # BLD: 1.8 K/MCL (ref 1–4)
LYMPHOCYTES # BLD: 1.8 THOUSAND/MCL (ref 1–4)
LYMPHOCYTES NFR BLD: 31 %
LYMPHOCYTES NFR BLD: 31 %
MCH RBC QN AUTO: 30.1 PG (ref 26–34)
MCH RBC QN AUTO: 30.1 PG (ref 26–34)
MCHC RBC AUTO-ENTMCNC: 34.2 G/DL (ref 32–36.5)
MCHC RBC AUTO-ENTMCNC: 34.2 GM/DL (ref 32–36.5)
MCV RBC AUTO: 88 FL (ref 78–100)
MCV RBC AUTO: 88 FL (ref 78–100)
MONOCYTES # BLD: 0.8 K/MCL (ref 0.3–0.9)
MONOCYTES # BLD: 0.8 THOUSAND/MCL (ref 0.3–0.9)
MONOCYTES NFR BLD: 13 %
MONOCYTES NFR BLD: 13 %
NEUTROPHILS # BLD: 3.2 K/MCL (ref 1.8–7.7)
NEUTROPHILS # BLD: 3.2 THOUSAND/MCL (ref 1.8–7.7)
NEUTROPHILS NFR BLD: 53 %
NEUTROPHILS NFR BLD: 53 %
NEUTS SEG NFR BLD: ABNORMAL %
NEUTS SEG NFR BLD: ABNORMAL %
NRBC (NRBCRE): 0 /100 WBC
NRBC (NRBCRE): 0 /100 WBC
PLATELET # BLD: 118 K/MCL (ref 140–450)
PLATELET # BLD: 118 THOUSAND/MCL (ref 140–450)
RBC # BLD: 5.02 MIL/MCL (ref 4.5–5.9)
RBC # BLD: 5.02 MILLION/MCL (ref 4.5–5.9)
WBC # BLD: 6 K/MCL (ref 4.2–11)
WBC # BLD: 6 THOUSAND/MCL (ref 4.2–11)

## 2019-04-30 ENCOUNTER — HOSPITAL (OUTPATIENT)
Dept: OTHER | Age: 69
End: 2019-04-30
Attending: INTERNAL MEDICINE

## 2019-04-30 LAB
ALT SERPL-CCNC: 39 UNIT/L
ALT SERPL-CCNC: 39 UNITS/L
ANALYZER ANC (IANC): ABNORMAL
ANALYZER ANC (IANC): ABNORMAL
ANION GAP SERPL CALC-SCNC: 13 MMOL/L (ref 10–20)
ANION GAP SERPL CALC-SCNC: 13 MMOL/L (ref 10–20)
AST SERPL-CCNC: 23 UNIT/L
AST SERPL-CCNC: 23 UNITS/L
BASOPHILS # BLD: 0 K/MCL (ref 0–0.3)
BASOPHILS # BLD: 0 THOUSAND/MCL (ref 0–0.3)
BASOPHILS NFR BLD: 0 %
BASOPHILS NFR BLD: 0 %
BUN SERPL-MCNC: 13 MG/DL (ref 6–20)
BUN SERPL-MCNC: 13 MG/DL (ref 6–20)
BUN/CREAT SERPL: 22 (ref 7–25)
BUN/CREAT SERPL: 22 (ref 7–25)
CALCIUM SERPL-MCNC: 10.6 MG/DL (ref 8.4–10.2)
CALCIUM SERPL-MCNC: 10.6 MG/DL (ref 8.4–10.2)
CHLORIDE SERPL-SCNC: 107 MMOL/L (ref 98–107)
CHLORIDE: 107 MMOL/L (ref 98–107)
CHOLEST SERPL-MCNC: 127 MG/DL
CHOLEST SERPL-MCNC: 127 MG/DL
CHOLEST SERPL-MCNC: ABNORMAL MG/DL
CHOLEST/HDLC SERPL: 2.5 {RATIO}
CHOLEST/HDLC SERPL: 2.5 {RATIO}
CO2 SERPL-SCNC: 26 MMOL/L (ref 21–32)
CO2 SERPL-SCNC: 26 MMOL/L (ref 21–32)
CREAT SERPL-MCNC: 0.59 MG/DL (ref 0.67–1.17)
CREAT SERPL-MCNC: 0.59 MG/DL (ref 0.67–1.17)
DIFFERENTIAL METHOD BLD: ABNORMAL
DIFFERENTIAL METHOD BLD: ABNORMAL
EOSINOPHIL # BLD: 0.1 K/MCL (ref 0.1–0.5)
EOSINOPHIL # BLD: 0.1 THOUSAND/MCL (ref 0.1–0.5)
EOSINOPHIL NFR BLD: 2 %
EOSINOPHIL NFR BLD: 2 %
ERYTHROCYTE [DISTWIDTH] IN BLOOD: 13.6 % (ref 11–15)
ERYTHROCYTE [DISTWIDTH] IN BLOOD: 13.6 % (ref 11–15)
GLUCOSE SERPL-MCNC: 96 MG/DL (ref 65–99)
GLUCOSE SERPL-MCNC: 96 MG/DL (ref 65–99)
HCT VFR BLD CALC: 44 % (ref 39–51)
HDLC SERPL-MCNC: 50 MG/DL
HDLC SERPL-MCNC: 50 MG/DL
HDLC SERPL-MCNC: ABNORMAL MG/DL
HEMATOCRIT: 44 % (ref 39–51)
HGB BLD-MCNC: 14.8 G/DL (ref 13–17)
HGB BLD-MCNC: 14.8 GM/DL (ref 13–17)
IMM GRANULOCYTES # BLD AUTO: 0 K/MCL (ref 0–0.2)
IMM GRANULOCYTES # BLD AUTO: 0 THOUSAND/MCL (ref 0–0.2)
IMM GRANULOCYTES NFR BLD: 0 %
IMM GRANULOCYTES NFR BLD: 0 %
LDLC SERPL CALC-MCNC: 33 MG/DL
LDLC SERPL CALC-MCNC: 33 MG/DL
LDLC SERPL CALC-MCNC: ABNORMAL MG/DL
LENGTH OF FAST TIME PATIENT: 12 HR
LENGTH OF FAST TIME PATIENT: 12 HR
LENGTH OF FAST TIME PATIENT: 12 HRS
LENGTH OF FAST TIME PATIENT: 12 HRS
LYMPHOCYTES # BLD: 1.7 K/MCL (ref 1–4)
LYMPHOCYTES # BLD: 1.7 THOUSAND/MCL (ref 1–4)
LYMPHOCYTES NFR BLD: 27 %
LYMPHOCYTES NFR BLD: 27 %
MCH RBC QN AUTO: 30.8 PG (ref 26–34)
MCH RBC QN AUTO: 30.8 PG (ref 26–34)
MCHC RBC AUTO-ENTMCNC: 33.6 G/DL (ref 32–36.5)
MCHC RBC AUTO-ENTMCNC: 33.6 GM/DL (ref 32–36.5)
MCV RBC AUTO: 91.7 FL (ref 78–100)
MCV RBC AUTO: 91.7 FL (ref 78–100)
MONOCYTES # BLD: 0.7 K/MCL (ref 0.3–0.9)
MONOCYTES # BLD: 0.7 THOUSAND/MCL (ref 0.3–0.9)
MONOCYTES NFR BLD: 11 %
MONOCYTES NFR BLD: 11 %
NEUTROPHILS # BLD: 3.8 K/MCL (ref 1.8–7.7)
NEUTROPHILS # BLD: 3.8 THOUSAND/MCL (ref 1.8–7.7)
NEUTROPHILS NFR BLD: 60 %
NEUTROPHILS NFR BLD: 60 %
NEUTS SEG NFR BLD: ABNORMAL %
NEUTS SEG NFR BLD: ABNORMAL %
NONHDLC SERPL-MCNC: 77 MG/DL
NONHDLC SERPL-MCNC: 77 MG/DL
NONHDLC SERPL-MCNC: ABNORMAL MG/DL
NRBC (NRBCRE): 0 /100 WBC
NRBC (NRBCRE): 0 /100 WBC
PLATELET # BLD: 118 K/MCL (ref 140–450)
PLATELET # BLD: 118 THOUSAND/MCL (ref 140–450)
POTASSIUM SERPL-SCNC: 4.2 MMOL/L (ref 3.4–5.1)
POTASSIUM SERPL-SCNC: 4.2 MMOL/L (ref 3.4–5.1)
RBC # BLD: 4.8 MIL/MCL (ref 4.5–5.9)
RBC # BLD: 4.8 MILLION/MCL (ref 4.5–5.9)
SODIUM SERPL-SCNC: 142 MMOL/L (ref 135–145)
SODIUM SERPL-SCNC: 142 MMOL/L (ref 135–145)
TRIGL SERPL-MCNC: 222 MG/DL
TRIGL SERPL-MCNC: ABNORMAL MG/DL
TRIGLYCERIDE (TRIGP): 222 MG/DL
WBC # BLD: 6.4 K/MCL (ref 4.2–11)
WBC # BLD: 6.4 THOUSAND/MCL (ref 4.2–11)

## 2019-05-03 ENCOUNTER — TELEPHONE (OUTPATIENT)
Dept: SCHEDULING | Age: 69
End: 2019-05-03

## 2019-05-07 ENCOUNTER — HOSPITAL (OUTPATIENT)
Dept: OTHER | Age: 69
End: 2019-05-07
Attending: INTERNAL MEDICINE

## 2019-05-09 ENCOUNTER — LAB SERVICES (OUTPATIENT)
Dept: LAB | Age: 69
End: 2019-05-09

## 2019-05-09 ENCOUNTER — OFFICE VISIT (OUTPATIENT)
Dept: FAMILY MEDICINE | Age: 69
End: 2019-05-09

## 2019-05-09 DIAGNOSIS — I25.10 CORONARY ARTERY DISEASE INVOLVING NATIVE CORONARY ARTERY OF NATIVE HEART WITHOUT ANGINA PECTORIS: ICD-10-CM

## 2019-05-09 DIAGNOSIS — N40.1 BPH ASSOCIATED WITH NOCTURIA: ICD-10-CM

## 2019-05-09 DIAGNOSIS — M19.90 OSTEOARTHRITIS, LOCALIZED: ICD-10-CM

## 2019-05-09 DIAGNOSIS — E78.5 HYPERLIPIDEMIA, UNSPECIFIED HYPERLIPIDEMIA TYPE: ICD-10-CM

## 2019-05-09 DIAGNOSIS — E83.52 HYPERCALCEMIA: ICD-10-CM

## 2019-05-09 DIAGNOSIS — E83.52 HYPERCALCEMIA: Primary | ICD-10-CM

## 2019-05-09 DIAGNOSIS — R35.1 BPH ASSOCIATED WITH NOCTURIA: ICD-10-CM

## 2019-05-09 PROBLEM — K64.9 HEMORRHOIDS: Status: ACTIVE | Noted: 2019-05-09

## 2019-05-09 PROBLEM — N52.9 ED (ERECTILE DYSFUNCTION) OF ORGANIC ORIGIN: Status: ACTIVE | Noted: 2019-05-09

## 2019-05-09 PROBLEM — F41.9 ANXIETY DISORDER: Status: ACTIVE | Noted: 2019-05-09

## 2019-05-09 PROBLEM — K92.1 HEMATOCHEZIA: Status: ACTIVE | Noted: 2019-05-09

## 2019-05-09 PROCEDURE — 36415 COLL VENOUS BLD VENIPUNCTURE: CPT | Performed by: FAMILY MEDICINE

## 2019-05-09 PROCEDURE — 99214 OFFICE O/P EST MOD 30 MIN: CPT | Performed by: FAMILY MEDICINE

## 2019-05-09 RX ORDER — RAMIPRIL 2.5 MG/1
2.5 CAPSULE ORAL DAILY
COMMUNITY

## 2019-05-09 RX ORDER — METOPROLOL SUCCINATE 25 MG/1
25 TABLET, EXTENDED RELEASE ORAL DAILY
COMMUNITY

## 2019-05-09 RX ORDER — ATORVASTATIN CALCIUM 20 MG/1
20 TABLET, FILM COATED ORAL DAILY
COMMUNITY

## 2019-05-09 RX ORDER — UBIDECARENONE 200 MG
CAPSULE ORAL
COMMUNITY
End: 2020-11-03 | Stop reason: ALTCHOICE

## 2019-05-09 ASSESSMENT — ENCOUNTER SYMPTOMS
CHILLS: 0
TROUBLE SWALLOWING: 0
VOMITING: 0
FEVER: 0
SHORTNESS OF BREATH: 0
DIZZINESS: 0
POLYDIPSIA: 0
COUGH: 0
SORE THROAT: 0
HEADACHES: 0
NAUSEA: 0

## 2019-05-09 ASSESSMENT — PATIENT HEALTH QUESTIONNAIRE - PHQ9
SUM OF ALL RESPONSES TO PHQ9 QUESTIONS 1 AND 2: 0
2. FEELING DOWN, DEPRESSED OR HOPELESS: NOT AT ALL
1. LITTLE INTEREST OR PLEASURE IN DOING THINGS: NOT AT ALL
SUM OF ALL RESPONSES TO PHQ9 QUESTIONS 1 AND 2: 0

## 2019-05-09 ASSESSMENT — PAIN SCALES - GENERAL: PAINLEVEL: 0

## 2019-05-10 LAB
ANION GAP SERPL CALC-SCNC: 14 MMOL/L (ref 10–20)
BUN SERPL-MCNC: 17 MG/DL (ref 6–20)
BUN/CREAT SERPL: 24 (ref 7–25)
CALCIUM SERPL-MCNC: 10.2 MG/DL (ref 8.4–10.2)
CHLORIDE SERPL-SCNC: 107 MMOL/L (ref 98–107)
CO2 SERPL-SCNC: 23 MMOL/L (ref 21–32)
CREAT SERPL-MCNC: 0.72 MG/DL (ref 0.67–1.17)
FASTING STATUS PATIENT QL REPORTED: NORMAL HRS
GLUCOSE SERPL-MCNC: 87 MG/DL (ref 65–99)
POTASSIUM SERPL-SCNC: 4.5 MMOL/L (ref 3.4–5.1)
PTH-INTACT SERPL-MCNC: 26 PG/ML (ref 19–88)
SODIUM SERPL-SCNC: 139 MMOL/L (ref 135–145)

## 2019-06-06 ENCOUNTER — HOSPITAL (OUTPATIENT)
Dept: OTHER | Age: 69
End: 2019-06-06

## 2019-06-07 ENCOUNTER — TELEPHONE (OUTPATIENT)
Dept: SCHEDULING | Age: 69
End: 2019-06-07

## 2019-06-11 ENCOUNTER — OFFICE VISIT (OUTPATIENT)
Dept: FAMILY MEDICINE | Age: 69
End: 2019-06-11

## 2019-06-11 DIAGNOSIS — N40.1 BPH ASSOCIATED WITH NOCTURIA: ICD-10-CM

## 2019-06-11 DIAGNOSIS — E78.5 HYPERLIPIDEMIA, UNSPECIFIED HYPERLIPIDEMIA TYPE: ICD-10-CM

## 2019-06-11 DIAGNOSIS — R35.1 BPH ASSOCIATED WITH NOCTURIA: ICD-10-CM

## 2019-06-11 DIAGNOSIS — M79.89 LEG SWELLING: ICD-10-CM

## 2019-06-11 DIAGNOSIS — E83.52 HYPERCALCEMIA: ICD-10-CM

## 2019-06-11 DIAGNOSIS — S80.11XA CONTUSION OF RIGHT LOWER LEG, INITIAL ENCOUNTER: Primary | ICD-10-CM

## 2019-06-11 DIAGNOSIS — I25.10 CORONARY ARTERY DISEASE INVOLVING NATIVE CORONARY ARTERY OF NATIVE HEART WITHOUT ANGINA PECTORIS: ICD-10-CM

## 2019-06-11 DIAGNOSIS — M79.604 RIGHT LEG PAIN: ICD-10-CM

## 2019-06-11 DIAGNOSIS — S80.11XA HEMATOMA OF RIGHT LOWER EXTREMITY, INITIAL ENCOUNTER: ICD-10-CM

## 2019-06-11 DIAGNOSIS — M19.90 OSTEOARTHRITIS, LOCALIZED: ICD-10-CM

## 2019-06-11 DIAGNOSIS — D69.6 THROMBOCYTOPENIA (CMD): ICD-10-CM

## 2019-06-11 PROCEDURE — 99214 OFFICE O/P EST MOD 30 MIN: CPT | Performed by: FAMILY MEDICINE

## 2019-06-11 PROCEDURE — 36415 COLL VENOUS BLD VENIPUNCTURE: CPT | Performed by: FAMILY MEDICINE

## 2019-06-11 RX ORDER — CEPHALEXIN 500 MG/1
500 CAPSULE ORAL 4 TIMES DAILY
COMMUNITY
End: 2019-09-16 | Stop reason: ALTCHOICE

## 2019-06-11 ASSESSMENT — PAIN SCALES - GENERAL: PAINLEVEL: 7-8

## 2019-06-11 ASSESSMENT — ENCOUNTER SYMPTOMS
DIZZINESS: 0
SORE THROAT: 0
COUGH: 0
NAUSEA: 0
SHORTNESS OF BREATH: 0
NUMBNESS: 1
FEVER: 0
VOMITING: 0
TROUBLE SWALLOWING: 0
POLYDIPSIA: 0
CHILLS: 0
ADENOPATHY: 0

## 2019-06-11 ASSESSMENT — PATIENT HEALTH QUESTIONNAIRE - PHQ9
2. FEELING DOWN, DEPRESSED OR HOPELESS: NOT AT ALL
SUM OF ALL RESPONSES TO PHQ9 QUESTIONS 1 AND 2: 0
1. LITTLE INTEREST OR PLEASURE IN DOING THINGS: NOT AT ALL
SUM OF ALL RESPONSES TO PHQ9 QUESTIONS 1 AND 2: 0

## 2019-06-12 LAB
BASOPHILS # BLD AUTO: 0 K/MCL (ref 0–0.3)
BASOPHILS NFR BLD AUTO: 1 %
DIFFERENTIAL METHOD BLD: ABNORMAL
EOSINOPHIL # BLD AUTO: 0.1 K/MCL (ref 0.1–0.5)
EOSINOPHIL NFR SPEC: 2 %
ERYTHROCYTE [DISTWIDTH] IN BLOOD: 13.3 % (ref 11–15)
HCT VFR BLD CALC: 41.7 % (ref 39–51)
HGB BLD-MCNC: 14.1 G/DL (ref 13–17)
IMM GRANULOCYTES # BLD AUTO: 0 K/MCL (ref 0–0.2)
IMM GRANULOCYTES NFR BLD: 0 %
LYMPHOCYTES # BLD MANUAL: 1.6 K/MCL (ref 1–4)
LYMPHOCYTES NFR BLD MANUAL: 22 %
MCH RBC QN AUTO: 30.9 PG (ref 26–34)
MCHC RBC AUTO-ENTMCNC: 33.8 G/DL (ref 32–36.5)
MCV RBC AUTO: 91.4 FL (ref 78–100)
MONOCYTES # BLD MANUAL: 0.8 K/MCL (ref 0.3–0.9)
MONOCYTES NFR BLD MANUAL: 11 %
NEUTROPHILS # BLD: 4.7 K/MCL (ref 1.8–7.7)
NEUTROPHILS NFR BLD AUTO: 64 %
NRBC BLD MANUAL-RTO: 0 /100 WBC
PLATELET # BLD: 126 K/MCL (ref 140–450)
RBC # BLD: 4.56 MIL/MCL (ref 4.5–5.9)
WBC # BLD: 7.3 K/MCL (ref 4.2–11)

## 2019-06-17 ENCOUNTER — TELEPHONE (OUTPATIENT)
Dept: SCHEDULING | Age: 69
End: 2019-06-17

## 2019-08-06 ENCOUNTER — APPOINTMENT (OUTPATIENT)
Dept: RADIOLOGY | Facility: HOSPITAL | Age: 69
End: 2019-08-06
Attending: FAMILY MEDICINE
Payer: MEDICARE

## 2019-08-06 ENCOUNTER — HOSPITAL ENCOUNTER (EMERGENCY)
Facility: HOSPITAL | Age: 69
Discharge: HOME | End: 2019-08-06
Attending: FAMILY MEDICINE
Payer: MEDICARE

## 2019-08-06 VITALS
RESPIRATION RATE: 16 BRPM | WEIGHT: 170 LBS | HEIGHT: 66 IN | TEMPERATURE: 98.2 F | DIASTOLIC BLOOD PRESSURE: 74 MMHG | SYSTOLIC BLOOD PRESSURE: 113 MMHG | BODY MASS INDEX: 27.32 KG/M2 | OXYGEN SATURATION: 95 % | HEART RATE: 75 BPM

## 2019-08-06 DIAGNOSIS — R07.9 CHEST PAIN: Primary | ICD-10-CM

## 2019-08-06 LAB
ALBUMIN SERPL-MCNC: 4.5 G/DL (ref 3.5–5.3)
ALP SERPL-CCNC: 73 U/L (ref 45–115)
ALT SERPL-CCNC: 23 U/L (ref 0–52)
ANION GAP SERPL CALC-SCNC: 8 MMOL/L (ref 3–11)
AST SERPL-CCNC: 24 U/L (ref 0–39)
BASOPHILS # BLD AUTO: 0 10*3/UL
BASOPHILS NFR BLD AUTO: 1 % (ref 0–2)
BILIRUB SERPL-MCNC: 1.24 MG/DL (ref 0–1.4)
BUN SERPL-MCNC: 14 MG/DL (ref 7–25)
CALCIUM ALBUM COR SERPL-MCNC: 9.4 MG/DL (ref 8.6–10.3)
CALCIUM SERPL-MCNC: 9.8 MG/DL (ref 8.6–10.3)
CHLORIDE SERPL-SCNC: 106 MMOL/L (ref 98–107)
CO2 SERPL-SCNC: 23 MMOL/L (ref 21–32)
CREAT SERPL-MCNC: 0.6 MG/DL (ref 0.7–1.3)
EOSINOPHIL # BLD AUTO: 0.1 10*3/UL
EOSINOPHIL NFR BLD AUTO: 1 % (ref 0–3)
ERYTHROCYTE [DISTWIDTH] IN BLOOD BY AUTOMATED COUNT: 13.1 % (ref 11.5–15)
GFR SERPL CREATININE-BSD FRML MDRD: 103 ML/MIN/1.73M*2
GLUCOSE SERPL-MCNC: 101 MG/DL (ref 70–105)
HCT VFR BLD AUTO: 44.6 % (ref 38–50)
HGB BLD-MCNC: 15 G/DL (ref 13.2–17.2)
LYMPHOCYTES # BLD AUTO: 1.5 10*3/UL
LYMPHOCYTES NFR BLD AUTO: 25 % (ref 15–47)
MAGNESIUM SERPL-MCNC: 1.9 MG/DL (ref 1.8–2.4)
MCH RBC QN AUTO: 30.2 PG (ref 29–34)
MCHC RBC AUTO-ENTMCNC: 33.7 G/DL (ref 32–36)
MCV RBC AUTO: 89.7 FL (ref 82–97)
MONOCYTES # BLD AUTO: 0.7 10*3/UL
MONOCYTES NFR BLD AUTO: 12 % (ref 5–13)
NEUTROPHILS # BLD AUTO: 3.6 10*3/UL
NEUTROPHILS NFR BLD AUTO: 61 % (ref 46–70)
PLATELET # BLD AUTO: 133 10*3/UL (ref 130–350)
PMV BLD AUTO: 10.3 FL (ref 6.9–10.8)
POTASSIUM SERPL-SCNC: 4.2 MMOL/L (ref 3.5–5.1)
PROT SERPL-MCNC: 7.1 G/DL (ref 6–8.3)
RBC # BLD AUTO: 4.97 10*6/ΜL (ref 4.1–5.8)
SODIUM SERPL-SCNC: 137 MMOL/L (ref 135–145)
TROPONIN I SERPL-MCNC: <0.03 NG/ML
TROPONIN I SERPL-MCNC: <0.03 NG/ML
WBC # BLD AUTO: 6 10*3/UL (ref 3.7–9.6)

## 2019-08-06 PROCEDURE — 6360000200 HC RX 636 W HCPCS (ALT 250 FOR IP)

## 2019-08-06 PROCEDURE — 93010 ELECTROCARDIOGRAM REPORT: CPT | Performed by: FAMILY MEDICINE

## 2019-08-06 PROCEDURE — 85025 COMPLETE CBC W/AUTO DIFF WBC: CPT | Performed by: FAMILY MEDICINE

## 2019-08-06 PROCEDURE — 93005 ELECTROCARDIOGRAM TRACING: CPT | Performed by: FAMILY MEDICINE

## 2019-08-06 PROCEDURE — 80053 COMPREHEN METABOLIC PANEL: CPT | Performed by: FAMILY MEDICINE

## 2019-08-06 PROCEDURE — 96374 THER/PROPH/DIAG INJ IV PUSH: CPT

## 2019-08-06 PROCEDURE — 71046 X-RAY EXAM CHEST 2 VIEWS: CPT

## 2019-08-06 PROCEDURE — 99284 EMERGENCY DEPT VISIT MOD MDM: CPT | Performed by: FAMILY MEDICINE

## 2019-08-06 PROCEDURE — 84484 ASSAY OF TROPONIN QUANT: CPT | Performed by: FAMILY MEDICINE

## 2019-08-06 PROCEDURE — 36415 COLL VENOUS BLD VENIPUNCTURE: CPT | Performed by: FAMILY MEDICINE

## 2019-08-06 PROCEDURE — 71046 X-RAY EXAM CHEST 2 VIEWS: CPT | Mod: 26 | Performed by: RADIOLOGY

## 2019-08-06 PROCEDURE — 99282 EMERGENCY DEPT VISIT SF MDM: CPT | Mod: 25 | Performed by: FAMILY MEDICINE

## 2019-08-06 PROCEDURE — 83735 ASSAY OF MAGNESIUM: CPT | Performed by: FAMILY MEDICINE

## 2019-08-06 RX ORDER — KETOROLAC TROMETHAMINE 15 MG/ML
INJECTION, SOLUTION INTRAMUSCULAR; INTRAVENOUS
Status: COMPLETED
Start: 2019-08-06 | End: 2019-08-06

## 2019-08-06 RX ORDER — KETOROLAC TROMETHAMINE 15 MG/ML
15 INJECTION, SOLUTION INTRAMUSCULAR; INTRAVENOUS ONCE
Status: COMPLETED | OUTPATIENT
Start: 2019-08-06 | End: 2019-08-06

## 2019-08-06 RX ADMIN — KETOROLAC TROMETHAMINE 15 MG: 15 INJECTION, SOLUTION INTRAMUSCULAR; INTRAVENOUS at 13:23

## 2019-08-06 RX ADMIN — KETOROLAC TROMETHAMINE 15 MG: 15 INJECTION INTRAMUSCULAR; INTRAVENOUS at 13:23

## 2019-08-06 ASSESSMENT — ENCOUNTER SYMPTOMS
HEMATURIA: 0
VOMITING: 0
ARTHRALGIAS: 0
EYE PAIN: 0
CHILLS: 0
COUGH: 0
SHORTNESS OF BREATH: 0
DIZZINESS: 0
DYSURIA: 0
BACK PAIN: 0
FEVER: 0
PALPITATIONS: 0
COLOR CHANGE: 0
ABDOMINAL PAIN: 0
SEIZURES: 0
SORE THROAT: 0

## 2019-08-06 ASSESSMENT — PAIN DESCRIPTION - DESCRIPTORS: DESCRIPTORS: DULL

## 2019-08-06 NOTE — DISCHARGE INSTRUCTIONS
Motrin/Tylenol as needed for pain.  Follow-up if symptoms worsen or do not improve, otherwise as needed.

## 2019-08-06 NOTE — ED PROVIDER NOTES
HPI:  Chief Complaint   Patient presents with   • Chest Pain     chest pain started yesterday, no activity at time of pain       69-year-old male presents with chest pain.  States this started yesterday.  On and off since then.  He took some Tylenol last night which he thinks may have helped.  He denies any injury or trauma.  He does state that is been lifting his tailgate of his trailer for the last couple of days.  He is here for the [facility].  He has a history of coronary disease with a stent placed several years ago.  He denies any shortness of breath.  He denies any chest tightness or pressure.      History provided by:  Patient   used: No    Chest Pain   Pain location:  Substernal area  Pain quality: aching    Pain radiates to:  Does not radiate  Pain severity:  Mild  Onset quality:  Gradual  Duration:  2 days  Timing:  Intermittent  Progression:  Waxing and waning  Chronicity:  New  Context: breathing and movement    Relieved by:  None tried  Worsened by:  Deep breathing and movement  Ineffective treatments:  None tried  Associated symptoms: no abdominal pain, no back pain, no cough, no dizziness, no fever, no palpitations, no shortness of breath and no vomiting    Risk factors: coronary artery disease and hypertension        HISTORY:  Past Medical History:   Diagnosis Date   • Arthritis    • Coronary artery disease     with stent    • Deep vein thrombosis (CMS/HCC) (HCC)     in left leg    • GERD (gastroesophageal reflux disease)    • High cholesterol    • History of transfusion    • Hypertension    • PONV (postoperative nausea and vomiting)    • Wears partial dentures     lower        Past Surgical History:   Procedure Laterality Date   • HIP ARTHROPLASTY Left    • JOINT REPLACEMENT     • REPLACEMENT TOTAL KNEE BILATERAL Bilateral    • SHOULDER SURGERY  01/01/2015    RCR   • TENDON REPAIR Left 1/5/2018    Procedure: TENDON TRANSFER OF EXTENSOR INDICIS TO EXTENSOR POLLICIS LONGUS OF LEFT  THUMB;  Surgeon: Nabil Wolfe DO;  Location: [facility] OR;  Service: Orthopedics;  Laterality: Left;   • TOTAL HIP ARTHROPLASTY Left 03/21/2017    With Dr. Strong   • TOTAL HIP ARTHROPLASTY  03/21/2017   • WRIST SURGERY         Family History   Problem Relation Age of Onset   • Other Mother         Malignant tumor of breast   • Arthritis Father    • Arthritis Sister    • Arthritis Brother    • Arthritis Maternal Grandmother    • Stroke Paternal Grandmother    • Stroke Paternal Grandfather        Social History     Tobacco Use   • Smoking status: Never Smoker   • Smokeless tobacco: Never Used   Substance Use Topics   • Alcohol use: No   • Drug use: No         ROS:  Review of Systems   Constitutional: Negative for chills and fever.   HENT: Negative for ear pain and sore throat.    Eyes: Negative for pain and visual disturbance.   Respiratory: Negative for cough and shortness of breath.    Cardiovascular: Positive for chest pain. Negative for palpitations.   Gastrointestinal: Negative for abdominal pain and vomiting.   Genitourinary: Negative for dysuria and hematuria.   Musculoskeletal: Negative for arthralgias and back pain.   Skin: Negative for color change and rash.   Neurological: Negative for dizziness, seizures and syncope.   All other systems reviewed and are negative.      PE:  ED Triage Vitals   Temp Heart Rate Resp BP SpO2   08/06/19 1210 08/06/19 1205 08/06/19 1210 08/06/19 1205 08/06/19 1210   36.8 °C (98.2 °F) 79 16 135/93 98 %      Temp Source Heart Rate Source Patient Position BP Location FiO2 (%)   08/06/19 1210 -- -- -- --   Temporal           Physical Exam   Constitutional: He is oriented to person, place, and time. He appears well-developed and well-nourished.   HENT:   Head: Normocephalic and atraumatic.   Eyes: Pupils are equal, round, and reactive to light. Conjunctivae and EOM are normal.   Neck: Normal range of motion. Neck supple.   Cardiovascular: Normal rate, regular rhythm and normal heart sounds.    No murmur heard.  Pulmonary/Chest: Effort normal and breath sounds normal. No respiratory distress.   Abdominal: Soft. Bowel sounds are normal. There is no tenderness.   Musculoskeletal: Normal range of motion. He exhibits no edema.   Neurological: He is alert and oriented to person, place, and time.   Skin: Skin is warm and dry. Capillary refill takes less than 2 seconds.   Psychiatric: He has a normal mood and affect.   Nursing note and vitals reviewed.      ED LABS:  Labs Reviewed   COMPREHENSIVE METABOLIC PANEL - Abnormal       Result Value    Sodium 137      Potassium 4.2      Chloride 106      CO2 23      Anion Gap 8      BUN 14      Creatinine 0.60 (*)     Glucose 101      Calcium 9.8      AST 24      ALT (SGPT) 23      Alkaline Phosphatase 73      Total Protein 7.1      Albumin 4.5      Total Bilirubin 1.24      eGFR 103      Corrected Calcium 9.4      Narrative:     Estimated GFR calculated using the 2009 CKD-EPI creatinine equation.   TROPONIN I - Normal    Troponin I <0.030     MAGNESIUM - Normal    Magnesium 1.9     TROPONIN I - Normal    Troponin I <0.030     CBC WITH AUTO DIFFERENTIAL    WBC 6.0      RBC 4.97      Hemoglobin 15.0      Hematocrit 44.6      MCV 89.7      MCH 30.2      MCHC 33.7      RDW 13.1      Platelets 133      MPV 10.3      Neutrophils% 61      Lymphocytes% 25      Monocytes% 12      Eosinophils% 1      Basophils% 1      Neutrophils Absolute 3.60      Lymphocytes Absolute 1.50      Monocytes Absolute 0.70      Eosinophils Absolute 0.10      Basophils Absolute 0.00           ED IMAGES:  X-ray chest 2 views   Final Result   IMPRESSION:    1.  Asymmetric prominence to the interstitial markings and bronchial wall thickening in the left infrahilar region. See above comments. It's possible this is chronic for this patient but without comparison films acute process not excluded.   2.  Underlying hyperexpansion of the lungs.          ED PROCEDURES:  Procedures    ED COURSE:      69-year-old male presents with chest pain.  The patient is seen and examined.  Examination as detailed above but is significant for a 69-year-old who has no significant findings.  He does have a little bit of chest pain on palpation of his anterior chest wall.  EKG shows sinus rhythm rate of 75.  No acute changes this is compared to and is similar to EKG dated November of the 18.  Troponin is negative.  Patient given some Toradol with complete resolution of pain.  Second troponin was negative.  The patient is reassured.  He will be discharged home.  He will follow-up as needed.       MDM:  MDM  Number of Diagnoses or Management Options     Amount and/or Complexity of Data Reviewed  Clinical lab tests: reviewed and ordered  Tests in the radiology section of CPT®: ordered and reviewed        Final diagnoses:   [R07.9] Chest pain        Jeffrey Silverman MD  08/06/19 1517

## 2019-09-16 ENCOUNTER — OFFICE VISIT (OUTPATIENT)
Dept: FAMILY MEDICINE | Age: 69
End: 2019-09-16

## 2019-09-16 VITALS
HEART RATE: 64 BPM | OXYGEN SATURATION: 96 % | BODY MASS INDEX: 28.77 KG/M2 | WEIGHT: 179 LBS | DIASTOLIC BLOOD PRESSURE: 66 MMHG | TEMPERATURE: 98 F | SYSTOLIC BLOOD PRESSURE: 118 MMHG | HEIGHT: 66 IN | RESPIRATION RATE: 16 BRPM

## 2019-09-16 DIAGNOSIS — M79.89 RIGHT LEG SWELLING: ICD-10-CM

## 2019-09-16 DIAGNOSIS — S80.11XD CONTUSION OF RIGHT LOWER LEG, SUBSEQUENT ENCOUNTER: ICD-10-CM

## 2019-09-16 DIAGNOSIS — L03.90 CELLULITIS, UNSPECIFIED CELLULITIS SITE: ICD-10-CM

## 2019-09-16 DIAGNOSIS — M79.604 RIGHT LEG PAIN: Primary | ICD-10-CM

## 2019-09-16 PROBLEM — T14.8XXA CONTUSION: Status: ACTIVE | Noted: 2019-09-16

## 2019-09-16 PROBLEM — D69.6 THROMBOCYTOPENIA (CMD): Status: ACTIVE | Noted: 2019-09-16

## 2019-09-16 PROCEDURE — 36415 COLL VENOUS BLD VENIPUNCTURE: CPT | Performed by: FAMILY MEDICINE

## 2019-09-16 PROCEDURE — 99214 OFFICE O/P EST MOD 30 MIN: CPT | Performed by: FAMILY MEDICINE

## 2019-09-16 ASSESSMENT — ENCOUNTER SYMPTOMS
ROS SKIN COMMENTS: (SEE HPI)
DIZZINESS: 0
CHILLS: 0
HEADACHES: 0
SHORTNESS OF BREATH: 0
SORE THROAT: 0
POLYDIPSIA: 0
COUGH: 0
VOMITING: 0
NAUSEA: 0
FEVER: 0
NUMBNESS: 0
TROUBLE SWALLOWING: 0

## 2019-09-16 ASSESSMENT — PATIENT HEALTH QUESTIONNAIRE - PHQ9
SUM OF ALL RESPONSES TO PHQ9 QUESTIONS 1 AND 2: 0
SUM OF ALL RESPONSES TO PHQ9 QUESTIONS 1 AND 2: 0
2. FEELING DOWN, DEPRESSED OR HOPELESS: NOT AT ALL
1. LITTLE INTEREST OR PLEASURE IN DOING THINGS: NOT AT ALL

## 2019-09-17 DIAGNOSIS — D69.6 THROMBOCYTOPENIA (CMD): Primary | ICD-10-CM

## 2019-09-17 LAB
BASOPHILS # BLD AUTO: 0 K/MCL (ref 0–0.3)
BASOPHILS NFR BLD AUTO: 1 %
DIFFERENTIAL METHOD BLD: NORMAL
EOSINOPHIL # BLD AUTO: 0.1 K/MCL (ref 0.1–0.5)
EOSINOPHIL NFR SPEC: 2 %
ERYTHROCYTE [DISTWIDTH] IN BLOOD: 13.3 % (ref 11–15)
HCT VFR BLD CALC: 47 % (ref 39–51)
HGB BLD-MCNC: 15.4 G/DL (ref 13–17)
IMM GRANULOCYTES # BLD AUTO: 0 K/MCL (ref 0–0.2)
IMM GRANULOCYTES NFR BLD: 0 %
LYMPHOCYTES # BLD MANUAL: 1.7 K/MCL (ref 1–4)
LYMPHOCYTES NFR BLD MANUAL: 27 %
MCH RBC QN AUTO: 30 PG (ref 26–34)
MCHC RBC AUTO-ENTMCNC: 32.8 G/DL (ref 32–36.5)
MCV RBC AUTO: 91.6 FL (ref 78–100)
MONOCYTES # BLD MANUAL: 0.7 K/MCL (ref 0.3–0.9)
MONOCYTES NFR BLD MANUAL: 12 %
NEUTROPHILS # BLD: 3.6 K/MCL (ref 1.8–7.7)
NEUTROPHILS NFR BLD AUTO: 58 %
NRBC BLD MANUAL-RTO: 0 /100 WBC
PLATELET # BLD: NORMAL K/MCL (ref 140–450)
RBC # BLD: 5.13 MIL/MCL (ref 4.5–5.9)
WBC # BLD: 6.1 K/MCL (ref 4.2–11)

## 2019-09-24 ENCOUNTER — LAB SERVICES (OUTPATIENT)
Dept: FAMILY MEDICINE | Age: 69
End: 2019-09-24

## 2019-09-24 DIAGNOSIS — D69.6 THROMBOCYTOPENIA (CMD): ICD-10-CM

## 2019-09-24 PROCEDURE — 36415 COLL VENOUS BLD VENIPUNCTURE: CPT | Performed by: FAMILY MEDICINE

## 2019-09-25 LAB
BASOPHILS # BLD AUTO: 0 K/MCL (ref 0–0.3)
BASOPHILS NFR BLD AUTO: 0 %
DIFFERENTIAL METHOD BLD: ABNORMAL
EOSINOPHIL # BLD AUTO: 0.2 K/MCL (ref 0.1–0.5)
EOSINOPHIL NFR SPEC: 3 %
ERYTHROCYTE [DISTWIDTH] IN BLOOD: 13.4 % (ref 11–15)
HCT VFR BLD CALC: 45.9 % (ref 39–51)
HGB BLD-MCNC: 15.4 G/DL (ref 13–17)
IMM GRANULOCYTES # BLD AUTO: 0 K/MCL (ref 0–0.2)
IMM GRANULOCYTES NFR BLD: 1 %
LYMPHOCYTES # BLD MANUAL: 2 K/MCL (ref 1–4)
LYMPHOCYTES NFR BLD MANUAL: 32 %
MCH RBC QN AUTO: 30 PG (ref 26–34)
MCHC RBC AUTO-ENTMCNC: 33.6 G/DL (ref 32–36.5)
MCV RBC AUTO: 89.5 FL (ref 78–100)
MONOCYTES # BLD MANUAL: 0.6 K/MCL (ref 0.3–0.9)
MONOCYTES NFR BLD MANUAL: 10 %
NEUTROPHILS # BLD: 3.2 K/MCL (ref 1.8–7.7)
NEUTROPHILS NFR BLD AUTO: 54 %
NRBC BLD MANUAL-RTO: 0 /100 WBC
PLATELET # BLD: 126 K/MCL (ref 140–450)
RBC # BLD: 5.13 MIL/MCL (ref 4.5–5.9)
WBC # BLD: 6 K/MCL (ref 4.2–11)

## 2019-10-30 ENCOUNTER — HOSPITAL ENCOUNTER (OUTPATIENT)
Dept: LAB | Age: 69
Discharge: HOME | End: 2019-10-30
Attending: INTERNAL MEDICINE

## 2019-10-30 ENCOUNTER — TELEPHONE (OUTPATIENT)
Dept: SCHEDULING | Age: 69
End: 2019-10-30

## 2019-10-31 LAB
BASOPHILS # BLD AUTO: 0 K/MCL (ref 0–0.3)
BASOPHILS NFR BLD AUTO: 1 %
DIFFERENTIAL METHOD BLD: ABNORMAL
EOSINOPHIL # BLD AUTO: 0.2 K/MCL (ref 0.1–0.5)
EOSINOPHIL NFR SPEC: 3 %
ERYTHROCYTE [DISTWIDTH] IN BLOOD: 13.7 % (ref 11–15)
HCT VFR BLD CALC: 45.1 % (ref 39–51)
HGB BLD-MCNC: 14.9 G/DL (ref 13–17)
IMM GRANULOCYTES # BLD AUTO: 0 K/MCL (ref 0–0.2)
IMM GRANULOCYTES NFR BLD: 1 %
LYMPHOCYTES # BLD MANUAL: 2.2 K/MCL (ref 1–4)
LYMPHOCYTES NFR BLD MANUAL: 35 %
MCH RBC QN AUTO: 30.1 PG (ref 26–34)
MCHC RBC AUTO-ENTMCNC: 33 G/DL (ref 32–36.5)
MCV RBC AUTO: 91.1 FL (ref 78–100)
MONOCYTES # BLD MANUAL: 0.7 K/MCL (ref 0.3–0.9)
MONOCYTES NFR BLD MANUAL: 12 %
NEUTROPHILS # BLD: 3 K/MCL (ref 1.8–7.7)
NEUTROPHILS NFR BLD AUTO: 48 %
NRBC BLD MANUAL-RTO: 0 /100 WBC
PLATELET # BLD: 117 K/MCL (ref 140–450)
RBC # BLD: 4.95 MIL/MCL (ref 4.5–5.9)
WBC # BLD: 6.1 K/MCL (ref 4.2–11)

## 2019-10-31 PROCEDURE — 85025 COMPLETE CBC W/AUTO DIFF WBC: CPT

## 2019-11-01 LAB
DATE:: NORMAL
FAX NO.:: NORMAL
MRN: NORMAL
TIME (FTI1): 943

## 2020-01-01 ENCOUNTER — EXTERNAL RECORD (OUTPATIENT)
Dept: HEALTH INFORMATION MANAGEMENT | Facility: OTHER | Age: 70
End: 2020-01-01

## 2020-05-11 PROCEDURE — 85025 COMPLETE CBC W/AUTO DIFF WBC: CPT | Performed by: INTERNAL MEDICINE

## 2020-05-11 PROCEDURE — 99214 OFFICE O/P EST MOD 30 MIN: CPT | Performed by: INTERNAL MEDICINE

## 2020-05-19 ENCOUNTER — HOSPITAL ENCOUNTER (OUTPATIENT)
Dept: LAB | Age: 70
Discharge: HOME | End: 2020-05-19
Attending: INTERNAL MEDICINE

## 2020-05-19 DIAGNOSIS — E78.2 MIXED HYPERLIPIDEMIA: ICD-10-CM

## 2020-05-19 DIAGNOSIS — I25.10 CORONARY ARTERY DISEASE INVOLVING NATIVE CORONARY ARTERY OF NATIVE HEART WITHOUT ANGINA PECTORIS: ICD-10-CM

## 2020-05-19 DIAGNOSIS — Z95.5 STENTED CORONARY ARTERY: Primary | ICD-10-CM

## 2020-05-19 LAB
ALT SERPL-CCNC: 42 UNITS/L
ANION GAP SERPL CALC-SCNC: 16 MMOL/L (ref 10–20)
AST SERPL-CCNC: 31 UNITS/L
BASOPHILS # BLD: 0 K/MCL (ref 0–0.3)
BASOPHILS NFR BLD: 1 %
BUN SERPL-MCNC: 21 MG/DL (ref 6–20)
BUN/CREAT SERPL: 32 (ref 7–25)
CALCIUM SERPL-MCNC: 9.6 MG/DL (ref 8.4–10.2)
CHLORIDE SERPL-SCNC: 107 MMOL/L (ref 98–107)
CHOLEST SERPL-MCNC: 146 MG/DL
CHOLEST/HDLC SERPL: 3.6 {RATIO}
CO2 SERPL-SCNC: 24 MMOL/L (ref 21–32)
CREAT SERPL-MCNC: 0.65 MG/DL (ref 0.67–1.17)
DIFFERENTIAL METHOD BLD: ABNORMAL
EOSINOPHIL # BLD: 0.1 K/MCL (ref 0.1–0.5)
EOSINOPHIL NFR BLD: 2 %
ERYTHROCYTE [DISTWIDTH] IN BLOOD: 13.1 % (ref 11–15)
GLUCOSE SERPL-MCNC: 105 MG/DL (ref 65–99)
HCT VFR BLD CALC: 44.9 % (ref 39–51)
HDLC SERPL-MCNC: 41 MG/DL
HGB BLD-MCNC: 15.1 G/DL (ref 13–17)
IMM GRANULOCYTES # BLD AUTO: 0 K/MCL (ref 0–0.2)
IMM GRANULOCYTES NFR BLD: 0 %
LDLC SERPL CALC-MCNC: 62 MG/DL
LYMPHOCYTES # BLD: 1.9 K/MCL (ref 1–4)
LYMPHOCYTES NFR BLD: 32 %
MCH RBC QN AUTO: 30.4 PG (ref 26–34)
MCHC RBC AUTO-ENTMCNC: 33.6 G/DL (ref 32–36.5)
MCV RBC AUTO: 90.3 FL (ref 78–100)
MONOCYTES # BLD: 0.6 K/MCL (ref 0.3–0.9)
MONOCYTES NFR BLD: 11 %
NEUTROPHILS # BLD: 3.3 K/MCL (ref 1.8–7.7)
NEUTROPHILS NFR BLD: 54 %
NONHDLC SERPL-MCNC: 105 MG/DL
NRBC BLD MANUAL-RTO: 0 /100 WBC
PLATELET # BLD: 115 K/MCL (ref 140–450)
POTASSIUM SERPL-SCNC: 4.6 MMOL/L (ref 3.4–5.1)
RBC # BLD: 4.97 MIL/MCL (ref 4.5–5.9)
SODIUM SERPL-SCNC: 142 MMOL/L (ref 135–145)
TRIGL SERPL-MCNC: 215 MG/DL
WBC # BLD: 6 K/MCL (ref 4.2–11)

## 2020-05-19 PROCEDURE — 84450 TRANSFERASE (AST) (SGOT): CPT

## 2020-05-19 PROCEDURE — 80048 BASIC METABOLIC PNL TOTAL CA: CPT

## 2020-05-19 PROCEDURE — 84460 ALANINE AMINO (ALT) (SGPT): CPT

## 2020-05-19 PROCEDURE — 80061 LIPID PANEL: CPT

## 2020-05-19 PROCEDURE — 36415 COLL VENOUS BLD VENIPUNCTURE: CPT

## 2020-05-19 PROCEDURE — 85025 COMPLETE CBC W/AUTO DIFF WBC: CPT

## 2020-07-22 ENCOUNTER — APPOINTMENT (OUTPATIENT)
Dept: LAB | Facility: HOSPITAL | Age: 70
End: 2020-07-22
Payer: MEDICARE

## 2020-07-22 DIAGNOSIS — E78.2 MIXED HYPERLIPIDEMIA: ICD-10-CM

## 2020-07-22 LAB
CHOLEST SERPL-MCNC: 114 MG/DL (ref 0–199)
FASTING STATUS PATIENT QL REPORTED: YES
HDLC SERPL-MCNC: 39 MG/DL
LDLC SERPL CALC-MCNC: 41 MG/DL (ref 20–99)
LDLC SERPL DIRECT ASSAY-MCNC: 49 MG/DL
TRIGL SERPL-MCNC: 170 MG/DL

## 2020-07-22 PROCEDURE — 80061 LIPID PANEL: CPT

## 2020-07-22 PROCEDURE — 36415 COLL VENOUS BLD VENIPUNCTURE: CPT

## 2020-07-22 PROCEDURE — 83721 ASSAY OF BLOOD LIPOPROTEIN: CPT | Mod: 59

## 2020-11-03 ENCOUNTER — APPOINTMENT (OUTPATIENT)
Dept: GENERAL RADIOLOGY | Age: 70
End: 2020-11-03
Attending: EMERGENCY MEDICINE

## 2020-11-03 ENCOUNTER — TELEPHONE (OUTPATIENT)
Dept: SCHEDULING | Age: 70
End: 2020-11-03

## 2020-11-03 ENCOUNTER — HOSPITAL ENCOUNTER (OUTPATIENT)
Age: 70
Setting detail: OBSERVATION
Discharge: HOME | End: 2020-11-04
Attending: EMERGENCY MEDICINE | Admitting: INTERNAL MEDICINE

## 2020-11-03 DIAGNOSIS — R11.0 NAUSEA: ICD-10-CM

## 2020-11-03 DIAGNOSIS — R07.9 CHEST PAIN, UNSPECIFIED TYPE: Primary | ICD-10-CM

## 2020-11-03 DIAGNOSIS — Z20.822 SUSPECTED COVID-19 VIRUS INFECTION: ICD-10-CM

## 2020-11-03 LAB
ALBUMIN SERPL-MCNC: 3.9 G/DL (ref 3.6–5.1)
ALBUMIN/GLOB SERPL: 1.1 {RATIO} (ref 1–2.4)
ALP SERPL-CCNC: 77 UNITS/L (ref 45–117)
ALT SERPL-CCNC: 33 UNITS/L
ANION GAP SERPL CALC-SCNC: 13 MMOL/L (ref 10–20)
APTT PPP: 27 SEC (ref 22–32)
AST SERPL-CCNC: 25 UNITS/L
BASOPHILS # BLD: 0 K/MCL (ref 0–0.3)
BASOPHILS NFR BLD: 0 %
BILIRUB SERPL-MCNC: 1.1 MG/DL (ref 0.2–1)
BUN SERPL-MCNC: 13 MG/DL (ref 6–20)
BUN/CREAT SERPL: 23 (ref 7–25)
CALCIUM SERPL-MCNC: 9.4 MG/DL (ref 8.4–10.2)
CHLORIDE SERPL-SCNC: 105 MMOL/L (ref 98–107)
CO2 SERPL-SCNC: 24 MMOL/L (ref 21–32)
CREAT SERPL-MCNC: 0.56 MG/DL (ref 0.67–1.17)
DIFFERENTIAL METHOD BLD: NORMAL
EOSINOPHIL # BLD: 0.1 K/MCL (ref 0.1–0.5)
EOSINOPHIL NFR BLD: 2 %
ERYTHROCYTE [DISTWIDTH] IN BLOOD: 13.5 % (ref 11–15)
GLOBULIN SER-MCNC: 3.5 G/DL (ref 2–4)
GLUCOSE SERPL-MCNC: 93 MG/DL (ref 65–99)
HCT VFR BLD CALC: 45 % (ref 39–51)
HGB BLD-MCNC: 15.1 G/DL (ref 13–17)
IMM GRANULOCYTES # BLD AUTO: 0 K/MCL (ref 0–0.2)
IMM GRANULOCYTES NFR BLD: 0 %
INR PPP: 1
LIPASE SERPL-CCNC: 102 UNITS/L (ref 73–393)
LYMPHOCYTES # BLD: 1.9 K/MCL (ref 1–4)
LYMPHOCYTES NFR BLD: 26 %
MAGNESIUM SERPL-MCNC: 1.7 MG/DL (ref 1.7–2.4)
MCH RBC QN AUTO: 30.2 PG (ref 26–34)
MCHC RBC AUTO-ENTMCNC: 33.6 G/DL (ref 32–36.5)
MCV RBC AUTO: 90 FL (ref 78–100)
MONOCYTES # BLD: 0.8 K/MCL (ref 0.3–0.9)
MONOCYTES NFR BLD: 12 %
NEUTROPHILS # BLD: 4.2 K/MCL (ref 1.8–7.7)
NEUTROPHILS NFR BLD: 60 %
NRBC BLD MANUAL-RTO: 0 /100 WBC
PLATELET # BLD: 142 K/MCL (ref 140–450)
POTASSIUM SERPL-SCNC: 4.5 MMOL/L (ref 3.4–5.1)
PROT SERPL-MCNC: 7.4 G/DL (ref 6.4–8.2)
PROTHROMBIN TIME: 10.7 SEC (ref 9.7–11.8)
RBC # BLD: 5 MIL/MCL (ref 4.5–5.9)
SODIUM SERPL-SCNC: 137 MMOL/L (ref 135–145)
TROPONIN I SERPL HS-MCNC: <0.02 NG/ML
TROPONIN I SERPL HS-MCNC: <0.02 NG/ML
WBC # BLD: 7.1 K/MCL (ref 4.2–11)

## 2020-11-03 PROCEDURE — 10002800 HB RX 250 W HCPCS: Performed by: INTERNAL MEDICINE

## 2020-11-03 PROCEDURE — 83690 ASSAY OF LIPASE: CPT

## 2020-11-03 PROCEDURE — 71045 X-RAY EXAM CHEST 1 VIEW: CPT

## 2020-11-03 PROCEDURE — 85025 COMPLETE CBC W/AUTO DIFF WBC: CPT

## 2020-11-03 PROCEDURE — 93005 ELECTROCARDIOGRAM TRACING: CPT | Performed by: EMERGENCY MEDICINE

## 2020-11-03 PROCEDURE — 96372 THER/PROPH/DIAG INJ SC/IM: CPT

## 2020-11-03 PROCEDURE — G0378 HOSPITAL OBSERVATION PER HR: HCPCS

## 2020-11-03 PROCEDURE — 85730 THROMBOPLASTIN TIME PARTIAL: CPT

## 2020-11-03 PROCEDURE — 99284 EMERGENCY DEPT VISIT MOD MDM: CPT

## 2020-11-03 PROCEDURE — 80053 COMPREHEN METABOLIC PANEL: CPT

## 2020-11-03 PROCEDURE — 85610 PROTHROMBIN TIME: CPT

## 2020-11-03 PROCEDURE — U0003 INFECTIOUS AGENT DETECTION BY NUCLEIC ACID (DNA OR RNA); SEVERE ACUTE RESPIRATORY SYNDROME CORONAVIRUS 2 (SARS-COV-2) (CORONAVIRUS DISEASE [COVID-19]), AMPLIFIED PROBE TECHNIQUE, MAKING USE OF HIGH THROUGHPUT TECHNOLOGIES AS DESCRIBED BY CMS-2020-01-R: HCPCS

## 2020-11-03 PROCEDURE — 96361 HYDRATE IV INFUSION ADD-ON: CPT

## 2020-11-03 PROCEDURE — 83735 ASSAY OF MAGNESIUM: CPT

## 2020-11-03 PROCEDURE — 10004651 HB RX, NO CHARGE ITEM: Performed by: INTERNAL MEDICINE

## 2020-11-03 PROCEDURE — 84484 ASSAY OF TROPONIN QUANT: CPT

## 2020-11-03 PROCEDURE — 10002807 HB RX 258: Performed by: EMERGENCY MEDICINE

## 2020-11-03 PROCEDURE — 10002803 HB RX 637: Performed by: INTERNAL MEDICINE

## 2020-11-03 PROCEDURE — 10004651 HB RX, NO CHARGE ITEM: Performed by: EMERGENCY MEDICINE

## 2020-11-03 PROCEDURE — 96360 HYDRATION IV INFUSION INIT: CPT

## 2020-11-03 RX ORDER — METOPROLOL SUCCINATE 25 MG/1
25 TABLET, EXTENDED RELEASE ORAL DAILY
Status: DISCONTINUED | OUTPATIENT
Start: 2020-11-04 | End: 2020-11-04 | Stop reason: HOSPADM

## 2020-11-03 RX ORDER — ZINC SULFATE 50(220)MG
220 CAPSULE ORAL
Status: DISCONTINUED | OUTPATIENT
Start: 2020-11-03 | End: 2020-11-04 | Stop reason: HOSPADM

## 2020-11-03 RX ORDER — ONDANSETRON 2 MG/ML
4 INJECTION INTRAMUSCULAR; INTRAVENOUS EVERY 12 HOURS PRN
Status: DISCONTINUED | OUTPATIENT
Start: 2020-11-03 | End: 2020-11-04 | Stop reason: HOSPADM

## 2020-11-03 RX ORDER — LANOLIN ALCOHOL/MO/W.PET/CERES
9 CREAM (GRAM) TOPICAL NIGHTLY
Status: DISCONTINUED | OUTPATIENT
Start: 2020-11-03 | End: 2020-11-04 | Stop reason: HOSPADM

## 2020-11-03 RX ORDER — ACETAMINOPHEN 325 MG/1
650 TABLET ORAL EVERY 4 HOURS PRN
Status: DISCONTINUED | OUTPATIENT
Start: 2020-11-03 | End: 2020-11-04 | Stop reason: HOSPADM

## 2020-11-03 RX ORDER — CHOLECALCIFEROL (VITAMIN D3) 125 MCG
1000 CAPSULE ORAL DAILY
Status: DISCONTINUED | OUTPATIENT
Start: 2020-11-04 | End: 2020-11-04 | Stop reason: HOSPADM

## 2020-11-03 RX ORDER — ATORVASTATIN CALCIUM 20 MG/1
20 TABLET, FILM COATED ORAL DAILY
Status: DISCONTINUED | OUTPATIENT
Start: 2020-11-04 | End: 2020-11-04 | Stop reason: HOSPADM

## 2020-11-03 RX ORDER — LISINOPRIL 10 MG/1
10 TABLET ORAL DAILY
Status: DISCONTINUED | OUTPATIENT
Start: 2020-11-04 | End: 2020-11-04 | Stop reason: HOSPADM

## 2020-11-03 RX ORDER — 0.9 % SODIUM CHLORIDE 0.9 %
2 VIAL (ML) INJECTION EVERY 12 HOURS SCHEDULED
Status: DISCONTINUED | OUTPATIENT
Start: 2020-11-03 | End: 2020-11-04 | Stop reason: HOSPADM

## 2020-11-03 RX ORDER — ASCORBIC ACID 500 MG
1000 TABLET ORAL 3 TIMES DAILY
Status: DISCONTINUED | OUTPATIENT
Start: 2020-11-03 | End: 2020-11-04 | Stop reason: HOSPADM

## 2020-11-03 RX ORDER — ACETAMINOPHEN 325 MG/1
650 TABLET ORAL EVERY 4 HOURS PRN
Status: DISCONTINUED | OUTPATIENT
Start: 2020-11-03 | End: 2020-11-03

## 2020-11-03 RX ORDER — ENOXAPARIN SODIUM 100 MG/ML
40 INJECTION SUBCUTANEOUS DAILY
Status: DISCONTINUED | OUTPATIENT
Start: 2020-11-03 | End: 2020-11-04 | Stop reason: HOSPADM

## 2020-11-03 RX ORDER — ASPIRIN 81 MG/1
324 TABLET, CHEWABLE ORAL ONCE
Status: COMPLETED | OUTPATIENT
Start: 2020-11-03 | End: 2020-11-03

## 2020-11-03 RX ADMIN — SODIUM CHLORIDE, PRESERVATIVE FREE 2 ML: 5 INJECTION INTRAVENOUS at 20:36

## 2020-11-03 RX ADMIN — ASPIRIN 324 MG: 81 TABLET, CHEWABLE ORAL at 15:05

## 2020-11-03 RX ADMIN — OXYCODONE HYDROCHLORIDE AND ACETAMINOPHEN 1000 MG: 500 TABLET ORAL at 20:35

## 2020-11-03 RX ADMIN — SODIUM CHLORIDE 1000 ML: 9 INJECTION, SOLUTION INTRAVENOUS at 13:10

## 2020-11-03 RX ADMIN — ZINC SULFATE 220 MG (50 MG) CAPSULE 220 MG: CAPSULE at 20:35

## 2020-11-03 RX ADMIN — ENOXAPARIN SODIUM 40 MG: 40 INJECTION SUBCUTANEOUS at 20:36

## 2020-11-03 ASSESSMENT — COGNITIVE AND FUNCTIONAL STATUS - GENERAL
BECAUSE OF A PHYSICAL, MENTAL, OR EMOTIONAL CONDITION, DO YOU HAVE SERIOUS DIFFICULTY CONCENTRATING, REMEMBERING OR MAKING DECISIONS: NO
DO YOU HAVE DIFFICULTY DRESSING OR BATHING: NO
BECAUSE OF A PHYSICAL, MENTAL, OR EMOTIONAL CONDITION, DO YOU HAVE DIFFICULTY DOING ERRANDS ALONE: NO
ARE YOU DEAF OR DO YOU HAVE SERIOUS DIFFICULTY  HEARING: YES
DO YOU HAVE SERIOUS DIFFICULTY WALKING OR CLIMBING STAIRS: NO
ARE YOU BLIND OR DO YOU HAVE SERIOUS DIFFICULTY SEEING, EVEN WHEN WEARING GLASSES: YES

## 2020-11-03 ASSESSMENT — ENCOUNTER SYMPTOMS
DIZZINESS: 0
BLURRED VISION: 0
DEPRESSION: 0
CHILLS: 0
FEVER: 0
VOMITING: 0
COUGH: 0
ABDOMINAL PAIN: 0
HEADACHES: 0
SHORTNESS OF BREATH: 0
SORE THROAT: 1
NAUSEA: 1
DOUBLE VISION: 0

## 2020-11-03 ASSESSMENT — ACTIVITIES OF DAILY LIVING (ADL)
RECENT_DECLINE_ADL: NO
ADL_SCORE: 12
ADL_BEFORE_ADMISSION: INDEPENDENT
CHRONIC_PAIN_PRESENT: NO
ADL_SHORT_OF_BREATH: NO

## 2020-11-03 ASSESSMENT — LIFESTYLE VARIABLES
HOW MANY STANDARD DRINKS CONTAINING ALCOHOL DO YOU HAVE ON A TYPICAL DAY: 0,1 OR 2
AUDIT-C TOTAL SCORE: 3
HOW OFTEN DO YOU HAVE 6 OR MORE DRINKS ON ONE OCCASION: LESS THAN MONTHLY
ALCOHOL_USE_STATUS: NO OR LOW RISK WITH VALIDATED TOOL
HOW OFTEN DO YOU HAVE A DRINK CONTAINING ALCOHOL: 2 TO 4 TIMES PER MONTH

## 2020-11-03 ASSESSMENT — HEART SCORE
AGE: EQUAL OR GREATER THAN 65
HISTORY: MODERATELY SUSPICIOUS
HEART SCORE: 5
EKG: NORMAL
TROPONIN: EQUAL OR LESS THAN NORMAL LIMIT
RISK FACTORS: EQUAL OR GREATER  THAN 3 RISK FACTORS OR HISTORY OF ATHEROSCLEROTIC DISEASE

## 2020-11-03 ASSESSMENT — PAIN SCALES - GENERAL
PAINLEVEL_OUTOF10: 0
PAINLEVEL_OUTOF10: 1
PAINLEVEL_OUTOF10: 0

## 2020-11-03 ASSESSMENT — COLUMBIA-SUICIDE SEVERITY RATING SCALE - C-SSRS
2. HAVE YOU ACTUALLY HAD ANY THOUGHTS OF KILLING YOURSELF?: NO
6. HAVE YOU EVER DONE ANYTHING, STARTED TO DO ANYTHING, OR PREPARED TO DO ANYTHING TO END YOUR LIFE?: NO
1. WITHIN THE PAST MONTH, HAVE YOU WISHED YOU WERE DEAD OR WISHED YOU COULD GO TO SLEEP AND NOT WAKE UP?: NO
IS THE PATIENT ABLE TO COMPLETE C-SSRS: YES

## 2020-11-03 ASSESSMENT — PATIENT HEALTH QUESTIONNAIRE - PHQ9
IS PATIENT ABLE TO COMPLETE PHQ2 OR PHQ9: YES
CLINICAL INTERPRETATION OF PHQ2 SCORE: NO FURTHER SCREENING NEEDED
2. FEELING DOWN, DEPRESSED OR HOPELESS: NOT AT ALL
SUM OF ALL RESPONSES TO PHQ9 QUESTIONS 1 AND 2: 1
1. LITTLE INTEREST OR PLEASURE IN DOING THINGS: SEVERAL DAYS
CLINICAL INTERPRETATION OF PHQ9 SCORE: NO FURTHER SCREENING NEEDED
SUM OF ALL RESPONSES TO PHQ9 QUESTIONS 1 AND 2: 1

## 2020-11-04 VITALS
SYSTOLIC BLOOD PRESSURE: 121 MMHG | OXYGEN SATURATION: 97 % | RESPIRATION RATE: 16 BRPM | WEIGHT: 174.82 LBS | TEMPERATURE: 97.7 F | HEIGHT: 66 IN | BODY MASS INDEX: 28.1 KG/M2 | DIASTOLIC BLOOD PRESSURE: 75 MMHG | HEART RATE: 62 BPM

## 2020-11-04 PROBLEM — Z20.822 PERSON UNDER INVESTIGATION FOR COVID-19: Status: ACTIVE | Noted: 2020-11-04

## 2020-11-04 PROBLEM — R05.9 COUGH: Status: ACTIVE | Noted: 2020-11-04

## 2020-11-04 PROBLEM — R07.89 ATYPICAL CHEST PAIN: Status: ACTIVE | Noted: 2020-11-04

## 2020-11-04 LAB
ANION GAP SERPL CALC-SCNC: 11 MMOL/L (ref 10–20)
ATRIAL RATE (BPM): 66
ATRIAL RATE (BPM): 74
BASOPHILS # BLD: 0 K/MCL (ref 0–0.3)
BASOPHILS NFR BLD: 0 %
BUN SERPL-MCNC: 13 MG/DL (ref 6–20)
BUN/CREAT SERPL: 22 (ref 7–25)
CALCIUM SERPL-MCNC: 8.9 MG/DL (ref 8.4–10.2)
CHLORIDE SERPL-SCNC: 107 MMOL/L (ref 98–107)
CO2 SERPL-SCNC: 25 MMOL/L (ref 21–32)
CREAT SERPL-MCNC: 0.58 MG/DL (ref 0.67–1.17)
CRP SERPL-MCNC: <0.3 MG/DL
D DIMER PPP FEU-MCNC: 0.44 MG/L (FEU)
DIFFERENTIAL METHOD BLD: ABNORMAL
EOSINOPHIL # BLD: 0.2 K/MCL (ref 0.1–0.5)
EOSINOPHIL NFR BLD: 3 %
ERYTHROCYTE [DISTWIDTH] IN BLOOD: 13.5 % (ref 11–15)
FERRITIN SERPL-MCNC: 133 NG/ML (ref 26–388)
FIBRINOGEN PPP-MCNC: 258 MG/DL (ref 190–425)
GLUCOSE SERPL-MCNC: 92 MG/DL (ref 65–99)
HCT VFR BLD CALC: 41.7 % (ref 39–51)
HGB BLD-MCNC: 13.9 G/DL (ref 13–17)
IMM GRANULOCYTES # BLD AUTO: 0 K/MCL (ref 0–0.2)
IMM GRANULOCYTES NFR BLD: 0 %
LYMPHOCYTES # BLD: 1.6 K/MCL (ref 1–4)
LYMPHOCYTES NFR BLD: 27 %
MAGNESIUM SERPL-MCNC: 1.8 MG/DL (ref 1.7–2.4)
MCH RBC QN AUTO: 30.4 PG (ref 26–34)
MCHC RBC AUTO-ENTMCNC: 33.3 G/DL (ref 32–36.5)
MCV RBC AUTO: 91.2 FL (ref 78–100)
MONOCYTES # BLD: 0.7 K/MCL (ref 0.3–0.9)
MONOCYTES NFR BLD: 11 %
NEUTROPHILS # BLD: 3.4 K/MCL (ref 1.8–7.7)
NEUTROPHILS NFR BLD: 59 %
NRBC BLD MANUAL-RTO: 0 /100 WBC
P AXIS (DEGREES): 26
P AXIS (DEGREES): 29
PLATELET # BLD: 117 K/MCL (ref 140–450)
POTASSIUM SERPL-SCNC: 4.3 MMOL/L (ref 3.4–5.1)
PR-INTERVAL (MSEC): 152
PR-INTERVAL (MSEC): 154
PROCALCITONIN SERPL IA-MCNC: <0.05 NG/ML
QRS-INTERVAL (MSEC): 76
QRS-INTERVAL (MSEC): 88
QT-INTERVAL (MSEC): 388
QT-INTERVAL (MSEC): 412
QTC: 430
QTC: 432
R AXIS (DEGREES): 47
R AXIS (DEGREES): 48
RBC # BLD: 4.57 MIL/MCL (ref 4.5–5.9)
REPORT TEXT: NORMAL
REPORT TEXT: NORMAL
SARS-COV-2 RNA RESP QL NAA+PROBE: NOT DETECTED
SERVICE CMNT-IMP: NORMAL
SODIUM SERPL-SCNC: 139 MMOL/L (ref 135–145)
SPECIMEN SOURCE: NORMAL
T AXIS (DEGREES): 68
T AXIS (DEGREES): 70
TROPONIN I SERPL HS-MCNC: <0.02 NG/ML
VENTRICULAR RATE EKG/MIN (BPM): 66
VENTRICULAR RATE EKG/MIN (BPM): 74
WBC # BLD: 5.9 K/MCL (ref 4.2–11)

## 2020-11-04 PROCEDURE — 85379 FIBRIN DEGRADATION QUANT: CPT

## 2020-11-04 PROCEDURE — 84484 ASSAY OF TROPONIN QUANT: CPT

## 2020-11-04 PROCEDURE — 80048 BASIC METABOLIC PNL TOTAL CA: CPT

## 2020-11-04 PROCEDURE — 85025 COMPLETE CBC W/AUTO DIFF WBC: CPT

## 2020-11-04 PROCEDURE — 82728 ASSAY OF FERRITIN: CPT

## 2020-11-04 PROCEDURE — 85384 FIBRINOGEN ACTIVITY: CPT

## 2020-11-04 PROCEDURE — 83735 ASSAY OF MAGNESIUM: CPT

## 2020-11-04 PROCEDURE — 10004651 HB RX, NO CHARGE ITEM: Performed by: INTERNAL MEDICINE

## 2020-11-04 PROCEDURE — 84145 PROCALCITONIN (PCT): CPT

## 2020-11-04 PROCEDURE — 36415 COLL VENOUS BLD VENIPUNCTURE: CPT

## 2020-11-04 PROCEDURE — G0378 HOSPITAL OBSERVATION PER HR: HCPCS

## 2020-11-04 PROCEDURE — 10002803 HB RX 637: Performed by: INTERNAL MEDICINE

## 2020-11-04 PROCEDURE — 86140 C-REACTIVE PROTEIN: CPT

## 2020-11-04 RX ADMIN — METOPROLOL SUCCINATE 25 MG: 25 TABLET, EXTENDED RELEASE ORAL at 08:46

## 2020-11-04 RX ADMIN — LISINOPRIL 10 MG: 10 TABLET ORAL at 08:48

## 2020-11-04 RX ADMIN — Medication 1000 MCG: at 08:46

## 2020-11-04 RX ADMIN — OXYCODONE HYDROCHLORIDE AND ACETAMINOPHEN 1000 MG: 500 TABLET ORAL at 08:46

## 2020-11-04 RX ADMIN — ASPIRIN 81 MG: 81 TABLET, COATED ORAL at 08:46

## 2020-11-04 RX ADMIN — ZINC SULFATE 220 MG (50 MG) CAPSULE 220 MG: CAPSULE at 08:46

## 2020-11-04 RX ADMIN — ATORVASTATIN CALCIUM 20 MG: 20 TABLET, FILM COATED ORAL at 08:46

## 2020-11-04 ASSESSMENT — PAIN SCALES - GENERAL
PAINLEVEL_OUTOF10: 0
PAINLEVEL_OUTOF10: 0

## 2021-01-01 ENCOUNTER — EXTERNAL RECORD (OUTPATIENT)
Dept: HEALTH INFORMATION MANAGEMENT | Facility: OTHER | Age: 71
End: 2021-01-01

## 2021-01-11 ENCOUNTER — CASE MANAGEMENT (OUTPATIENT)
Dept: CARE COORDINATION | Age: 71
End: 2021-01-11

## 2021-01-13 ENCOUNTER — CASE MANAGEMENT (OUTPATIENT)
Dept: CARE COORDINATION | Age: 71
End: 2021-01-13

## 2021-01-13 SDOH — ECONOMIC STABILITY: HOUSING INSECURITY: ARE YOU WORRIED ABOUT LOSING YOUR HOUSING?: NO

## 2021-01-13 SDOH — ECONOMIC STABILITY: HOUSING INSECURITY: WHAT IS YOUR LIVING SITUATION TODAY?: I HAVE HOUSING

## 2021-01-13 ASSESSMENT — PATIENT HEALTH QUESTIONNAIRE - PHQ9
CLINICAL INTERPRETATION OF PHQ9 SCORE: NO FURTHER SCREENING NEEDED
1. LITTLE INTEREST OR PLEASURE IN DOING THINGS: NOT AT ALL
2. FEELING DOWN, DEPRESSED OR HOPELESS: NOT AT ALL
SUM OF ALL RESPONSES TO PHQ9 QUESTIONS 1 AND 2: 0
SUM OF ALL RESPONSES TO PHQ9 QUESTIONS 1 AND 2: 0
CLINICAL INTERPRETATION OF PHQ2 SCORE: NO FURTHER SCREENING NEEDED

## 2021-01-13 ASSESSMENT — ACTIVITIES OF DAILY LIVING (ADL)
EATING: INDEPENDENT
MOBILITY: NO IMPAIRMENT OF MUSCULOSKELETAL, FINE OR GROSS MOTOR SKILLS
DME_IN_USE: NO
FUNCTIONAL_EVALUATION: PERFORMS ADLS INDEPENDENTLY
DRESSING: INDEPENDENT
DRIVING: INDEPENDENT
TOILETING: INDEPENDENT
BATHING: INDEPENDENT
AMBULATION: INDEPENDENT
GROOMING: INDEPENDENT

## 2021-01-29 ENCOUNTER — CASE MANAGEMENT (OUTPATIENT)
Dept: CARE COORDINATION | Age: 71
End: 2021-01-29

## 2021-02-03 ENCOUNTER — CASE MANAGEMENT (OUTPATIENT)
Dept: CARE COORDINATION | Age: 71
End: 2021-02-03

## 2021-04-07 ENCOUNTER — LAB SERVICES (OUTPATIENT)
Dept: LAB | Age: 71
End: 2021-04-07
Attending: INTERNAL MEDICINE

## 2021-04-07 DIAGNOSIS — Z95.5 STENTED CORONARY ARTERY: ICD-10-CM

## 2021-04-07 DIAGNOSIS — E78.2 MIXED HYPERLIPIDEMIA: ICD-10-CM

## 2021-04-07 DIAGNOSIS — I25.10 ATHEROSCLEROSIS OF NATIVE CORONARY ARTERY OF NATIVE HEART WITHOUT ANGINA PECTORIS: Primary | ICD-10-CM

## 2021-04-07 LAB
ALT SERPL-CCNC: 37 UNITS/L
ANION GAP SERPL CALC-SCNC: 11 MMOL/L (ref 10–20)
AST SERPL-CCNC: 28 UNITS/L
BUN SERPL-MCNC: 18 MG/DL (ref 6–20)
BUN/CREAT SERPL: 26 (ref 7–25)
CALCIUM SERPL-MCNC: 9.2 MG/DL (ref 8.4–10.2)
CHLORIDE SERPL-SCNC: 106 MMOL/L (ref 98–107)
CHOLEST SERPL-MCNC: 130 MG/DL
CHOLEST/HDLC SERPL: 2.8 {RATIO}
CO2 SERPL-SCNC: 26 MMOL/L (ref 21–32)
CREAT SERPL-MCNC: 0.69 MG/DL (ref 0.67–1.17)
FASTING DURATION TIME PATIENT: ABNORMAL H
FASTING DURATION TIME PATIENT: ABNORMAL H
GFR SERPLBLD BASED ON 1.73 SQ M-ARVRAT: >90 ML/MIN/1.73M2
GLUCOSE SERPL-MCNC: 106 MG/DL (ref 65–99)
HDLC SERPL-MCNC: 46 MG/DL
LDLC SERPL CALC-MCNC: 50 MG/DL
NONHDLC SERPL-MCNC: 84 MG/DL
POTASSIUM SERPL-SCNC: 4.3 MMOL/L (ref 3.4–5.1)
SODIUM SERPL-SCNC: 139 MMOL/L (ref 135–145)
TRIGL SERPL-MCNC: 169 MG/DL

## 2021-04-07 PROCEDURE — 84460 ALANINE AMINO (ALT) (SGPT): CPT

## 2021-04-07 PROCEDURE — 80061 LIPID PANEL: CPT

## 2021-04-07 PROCEDURE — 80048 BASIC METABOLIC PNL TOTAL CA: CPT

## 2021-04-07 PROCEDURE — 84450 TRANSFERASE (AST) (SGOT): CPT

## 2021-04-07 PROCEDURE — 36415 COLL VENOUS BLD VENIPUNCTURE: CPT

## 2021-05-25 VITALS
DIASTOLIC BLOOD PRESSURE: 78 MMHG | SYSTOLIC BLOOD PRESSURE: 124 MMHG | TEMPERATURE: 98 F | OXYGEN SATURATION: 98 % | SYSTOLIC BLOOD PRESSURE: 124 MMHG | RESPIRATION RATE: 14 BRPM | WEIGHT: 177 LBS | BODY MASS INDEX: 28.77 KG/M2 | BODY MASS INDEX: 28.45 KG/M2 | HEART RATE: 78 BPM | DIASTOLIC BLOOD PRESSURE: 70 MMHG | RESPIRATION RATE: 16 BRPM | HEART RATE: 88 BPM | HEIGHT: 66 IN | WEIGHT: 179 LBS | OXYGEN SATURATION: 98 % | TEMPERATURE: 97.6 F | HEIGHT: 66 IN

## 2021-07-15 ENCOUNTER — TELEPHONE (OUTPATIENT)
Dept: SCHEDULING | Age: 71
End: 2021-07-15

## 2021-10-05 ENCOUNTER — OFFICE VISIT (OUTPATIENT)
Dept: FAMILY MEDICINE | Age: 71
End: 2021-10-05

## 2021-10-05 VITALS
HEART RATE: 82 BPM | WEIGHT: 187 LBS | OXYGEN SATURATION: 98 % | HEIGHT: 66 IN | BODY MASS INDEX: 30.05 KG/M2 | RESPIRATION RATE: 14 BRPM | SYSTOLIC BLOOD PRESSURE: 124 MMHG | DIASTOLIC BLOOD PRESSURE: 72 MMHG | TEMPERATURE: 97.4 F

## 2021-10-05 DIAGNOSIS — N40.1 BPH ASSOCIATED WITH NOCTURIA: ICD-10-CM

## 2021-10-05 DIAGNOSIS — M16.12 PRIMARY OSTEOARTHRITIS OF LEFT HIP: ICD-10-CM

## 2021-10-05 DIAGNOSIS — D69.6 THROMBOCYTOPENIA (CMD): ICD-10-CM

## 2021-10-05 DIAGNOSIS — R35.1 BPH ASSOCIATED WITH NOCTURIA: ICD-10-CM

## 2021-10-05 DIAGNOSIS — E78.5 HYPERLIPIDEMIA, UNSPECIFIED HYPERLIPIDEMIA TYPE: Primary | ICD-10-CM

## 2021-10-05 DIAGNOSIS — I25.10 CORONARY ARTERY DISEASE INVOLVING NATIVE CORONARY ARTERY OF NATIVE HEART WITHOUT ANGINA PECTORIS: ICD-10-CM

## 2021-10-05 DIAGNOSIS — F41.9 ANXIETY DISORDER, UNSPECIFIED TYPE: ICD-10-CM

## 2021-10-05 DIAGNOSIS — M19.90 OSTEOARTHRITIS, LOCALIZED: ICD-10-CM

## 2021-10-05 PROBLEM — E83.52 HYPERCALCEMIA: Status: RESOLVED | Noted: 2019-05-09 | Resolved: 2021-10-05

## 2021-10-05 PROBLEM — L03.90 CELLULITIS: Status: RESOLVED | Noted: 2019-09-16 | Resolved: 2021-10-05

## 2021-10-05 PROBLEM — M79.89 RIGHT LEG SWELLING: Status: RESOLVED | Noted: 2019-09-16 | Resolved: 2021-10-05

## 2021-10-05 PROBLEM — M79.604 RIGHT LEG PAIN: Status: RESOLVED | Noted: 2019-09-16 | Resolved: 2021-10-05

## 2021-10-05 PROBLEM — R05.9 COUGH: Status: RESOLVED | Noted: 2020-11-04 | Resolved: 2021-10-05

## 2021-10-05 PROBLEM — K92.1 HEMATOCHEZIA: Status: RESOLVED | Noted: 2019-05-09 | Resolved: 2021-10-05

## 2021-10-05 PROBLEM — T14.8XXA CONTUSION: Status: RESOLVED | Noted: 2019-09-16 | Resolved: 2021-10-05

## 2021-10-05 PROBLEM — R07.89 ATYPICAL CHEST PAIN: Status: RESOLVED | Noted: 2020-11-04 | Resolved: 2021-10-05

## 2021-10-05 PROCEDURE — 99214 OFFICE O/P EST MOD 30 MIN: CPT | Performed by: FAMILY MEDICINE

## 2021-10-05 PROCEDURE — 93000 ELECTROCARDIOGRAM COMPLETE: CPT | Performed by: FAMILY MEDICINE

## 2021-10-05 ASSESSMENT — PATIENT HEALTH QUESTIONNAIRE - PHQ9
CLINICAL INTERPRETATION OF PHQ2 SCORE: NO FURTHER SCREENING NEEDED
SUM OF ALL RESPONSES TO PHQ9 QUESTIONS 1 AND 2: 0
2. FEELING DOWN, DEPRESSED OR HOPELESS: NOT AT ALL
CLINICAL INTERPRETATION OF PHQ9 SCORE: NO FURTHER SCREENING NEEDED
1. LITTLE INTEREST OR PLEASURE IN DOING THINGS: NOT AT ALL
SUM OF ALL RESPONSES TO PHQ9 QUESTIONS 1 AND 2: 0

## 2021-10-05 ASSESSMENT — ENCOUNTER SYMPTOMS
VOMITING: 0
HEADACHES: 0
SORE THROAT: 0
CHILLS: 0
POLYDIPSIA: 0
COUGH: 0
NERVOUS/ANXIOUS: 0
FEVER: 0
ENDOCRINE COMMENTS: (SEE HPI)
ABDOMINAL PAIN: 0
DIZZINESS: 0
TROUBLE SWALLOWING: 0
EYE PAIN: 0
SHORTNESS OF BREATH: 0
NAUSEA: 0
NUMBNESS: 0

## 2021-10-05 ASSESSMENT — PAIN SCALES - GENERAL: PAINLEVEL: 0

## 2021-10-06 ENCOUNTER — NURSE ONLY (OUTPATIENT)
Dept: FAMILY MEDICINE | Age: 71
End: 2021-10-06

## 2021-10-06 DIAGNOSIS — M16.12 PRIMARY OSTEOARTHRITIS OF LEFT HIP: ICD-10-CM

## 2021-10-06 DIAGNOSIS — D69.6 THROMBOCYTOPENIA (CMD): ICD-10-CM

## 2021-10-06 DIAGNOSIS — F41.9 ANXIETY DISORDER, UNSPECIFIED TYPE: ICD-10-CM

## 2021-10-06 DIAGNOSIS — M19.90 OSTEOARTHRITIS, LOCALIZED: ICD-10-CM

## 2021-10-06 DIAGNOSIS — R35.1 BPH ASSOCIATED WITH NOCTURIA: ICD-10-CM

## 2021-10-06 DIAGNOSIS — I25.10 CORONARY ARTERY DISEASE INVOLVING NATIVE CORONARY ARTERY OF NATIVE HEART WITHOUT ANGINA PECTORIS: ICD-10-CM

## 2021-10-06 DIAGNOSIS — E78.5 HYPERLIPIDEMIA, UNSPECIFIED HYPERLIPIDEMIA TYPE: Primary | ICD-10-CM

## 2021-10-06 DIAGNOSIS — N40.1 BPH ASSOCIATED WITH NOCTURIA: ICD-10-CM

## 2021-10-06 PROCEDURE — 36415 COLL VENOUS BLD VENIPUNCTURE: CPT | Performed by: FAMILY MEDICINE

## 2021-10-06 PROCEDURE — 80061 LIPID PANEL: CPT | Performed by: CLINICAL MEDICAL LABORATORY

## 2021-10-06 PROCEDURE — 84443 ASSAY THYROID STIM HORMONE: CPT | Performed by: CLINICAL MEDICAL LABORATORY

## 2021-10-06 PROCEDURE — 85025 COMPLETE CBC W/AUTO DIFF WBC: CPT | Performed by: CLINICAL MEDICAL LABORATORY

## 2021-10-06 PROCEDURE — 80053 COMPREHEN METABOLIC PANEL: CPT | Performed by: CLINICAL MEDICAL LABORATORY

## 2021-10-06 PROCEDURE — 84153 ASSAY OF PSA TOTAL: CPT | Performed by: CLINICAL MEDICAL LABORATORY

## 2021-10-07 LAB
ALBUMIN SERPL-MCNC: 4.3 G/DL (ref 3.6–5.1)
ALBUMIN/GLOB SERPL: 1.3 {RATIO} (ref 1–2.4)
ALP SERPL-CCNC: 79 UNITS/L (ref 45–117)
ALT SERPL-CCNC: 43 UNITS/L
ANION GAP SERPL CALC-SCNC: 13 MMOL/L (ref 10–20)
AST SERPL-CCNC: 29 UNITS/L
BASOPHILS # BLD: 0 K/MCL (ref 0–0.3)
BASOPHILS NFR BLD: 1 %
BILIRUB SERPL-MCNC: 1.3 MG/DL (ref 0.2–1)
BUN SERPL-MCNC: 15 MG/DL (ref 6–20)
BUN/CREAT SERPL: 23 (ref 7–25)
CALCIUM SERPL-MCNC: 10.3 MG/DL (ref 8.4–10.2)
CHLORIDE SERPL-SCNC: 106 MMOL/L (ref 98–107)
CHOLEST SERPL-MCNC: 136 MG/DL
CHOLEST/HDLC SERPL: 2.5 {RATIO}
CO2 SERPL-SCNC: 24 MMOL/L (ref 21–32)
CREAT SERPL-MCNC: 0.66 MG/DL (ref 0.67–1.17)
DEPRECATED RDW RBC: 45.6 FL (ref 39–50)
EOSINOPHIL # BLD: 0.1 K/MCL (ref 0–0.5)
EOSINOPHIL NFR BLD: 2 %
ERYTHROCYTE [DISTWIDTH] IN BLOOD: 13.4 % (ref 11–15)
FASTING DURATION TIME PATIENT: ABNORMAL H
FASTING DURATION TIME PATIENT: ABNORMAL H
GFR SERPLBLD BASED ON 1.73 SQ M-ARVRAT: >90 ML/MIN
GLOBULIN SER-MCNC: 3.2 G/DL (ref 2–4)
GLUCOSE SERPL-MCNC: 95 MG/DL (ref 70–99)
HCT VFR BLD CALC: 46.6 % (ref 39–51)
HDLC SERPL-MCNC: 54 MG/DL
HGB BLD-MCNC: 15.7 G/DL (ref 13–17)
IMM GRANULOCYTES # BLD AUTO: 0 K/MCL (ref 0–0.2)
IMM GRANULOCYTES # BLD: 0 %
LDLC SERPL CALC-MCNC: 44 MG/DL
LYMPHOCYTES # BLD: 2.2 K/MCL (ref 1–4)
LYMPHOCYTES NFR BLD: 30 %
MCH RBC QN AUTO: 31 PG (ref 26–34)
MCHC RBC AUTO-ENTMCNC: 33.7 G/DL (ref 32–36.5)
MCV RBC AUTO: 91.9 FL (ref 78–100)
MONOCYTES # BLD: 0.9 K/MCL (ref 0.3–0.9)
MONOCYTES NFR BLD: 12 %
NEUTROPHILS # BLD: 4.1 K/MCL (ref 1.8–7.7)
NEUTROPHILS NFR BLD: 55 %
NONHDLC SERPL-MCNC: 82 MG/DL
NRBC BLD MANUAL-RTO: 0 /100 WBC
PLATELET # BLD AUTO: NORMAL 10*3/UL
POTASSIUM SERPL-SCNC: 4.6 MMOL/L (ref 3.4–5.1)
PROT SERPL-MCNC: 7.5 G/DL (ref 6.4–8.2)
PSA SERPL-MCNC: 0.59 NG/ML
RBC # BLD: 5.07 MIL/MCL (ref 4.5–5.9)
SODIUM SERPL-SCNC: 138 MMOL/L (ref 135–145)
TRIGL SERPL-MCNC: 188 MG/DL
TSH SERPL-ACNC: 3.08 MCUNITS/ML (ref 0.35–5)
WBC # BLD: 7.3 K/MCL (ref 4.2–11)

## 2021-10-20 ENCOUNTER — LAB SERVICES (OUTPATIENT)
Dept: LAB | Age: 71
End: 2021-10-20
Attending: INTERNAL MEDICINE

## 2021-10-20 DIAGNOSIS — I25.119 ATHEROSCLEROSIS OF NATIVE CORONARY ARTERY WITH ANGINA PECTORIS, UNSPECIFIED WHETHER NATIVE OR TRANSPLANTED HEART (CMD): Primary | ICD-10-CM

## 2021-10-20 DIAGNOSIS — Z95.5 STENTED CORONARY ARTERY: ICD-10-CM

## 2021-10-20 LAB
BASOPHILS # BLD: 0 K/MCL (ref 0–0.3)
BASOPHILS NFR BLD: 0 %
DEPRECATED RDW RBC: 44.3 FL (ref 39–50)
EOSINOPHIL # BLD: 0.2 K/MCL (ref 0–0.5)
EOSINOPHIL NFR BLD: 3 %
ERYTHROCYTE [DISTWIDTH] IN BLOOD: 13.3 % (ref 11–15)
HCT VFR BLD CALC: 45.5 % (ref 39–51)
HGB BLD-MCNC: 15.3 G/DL (ref 13–17)
IMM GRANULOCYTES # BLD AUTO: 0 K/MCL (ref 0–0.2)
IMM GRANULOCYTES # BLD: 1 %
LYMPHOCYTES # BLD: 1.9 K/MCL (ref 1–4)
LYMPHOCYTES NFR BLD: 31 %
MCH RBC QN AUTO: 30.4 PG (ref 26–34)
MCHC RBC AUTO-ENTMCNC: 33.6 G/DL (ref 32–36.5)
MCV RBC AUTO: 90.5 FL (ref 78–100)
MONOCYTES # BLD: 0.8 K/MCL (ref 0.3–0.9)
MONOCYTES NFR BLD: 12 %
NEUTROPHILS # BLD: 3.3 K/MCL (ref 1.8–7.7)
NEUTROPHILS NFR BLD: 53 %
NRBC BLD MANUAL-RTO: 0 /100 WBC
PLATELET # BLD AUTO: 113 K/MCL (ref 140–450)
RBC # BLD: 5.03 MIL/MCL (ref 4.5–5.9)
WBC # BLD: 6.1 K/MCL (ref 4.2–11)

## 2021-10-20 PROCEDURE — 36415 COLL VENOUS BLD VENIPUNCTURE: CPT

## 2021-10-20 PROCEDURE — 85025 COMPLETE CBC W/AUTO DIFF WBC: CPT

## 2021-11-06 ENCOUNTER — TELEPHONE (OUTPATIENT)
Dept: SCHEDULING | Age: 71
End: 2021-11-06

## 2021-11-08 ENCOUNTER — APPOINTMENT (OUTPATIENT)
Dept: URGENT CARE | Age: 71
End: 2021-11-08

## 2022-04-12 ENCOUNTER — NURSE TRIAGE (OUTPATIENT)
Dept: SCHEDULING | Age: 72
End: 2022-04-12

## 2022-04-13 ENCOUNTER — V-VISIT (OUTPATIENT)
Dept: FAMILY MEDICINE | Age: 72
End: 2022-04-13

## 2022-04-13 DIAGNOSIS — U07.1 COVID-19 VIRUS DETECTED: Primary | ICD-10-CM

## 2022-04-13 PROCEDURE — 99213 OFFICE O/P EST LOW 20 MIN: CPT | Performed by: NURSE PRACTITIONER

## 2022-04-13 ASSESSMENT — COGNITIVE AND FUNCTIONAL STATUS - GENERAL
DO YOU HAVE SERIOUS DIFFICULTY WALKING OR CLIMBING STAIRS: NO
DO YOU HAVE DIFFICULTY DRESSING OR BATHING: NO
BECAUSE OF A PHYSICAL, MENTAL, OR EMOTIONAL CONDITION, DO YOU HAVE DIFFICULTY DOING ERRANDS ALONE: NO
BECAUSE OF A PHYSICAL, MENTAL, OR EMOTIONAL CONDITION, DO YOU HAVE SERIOUS DIFFICULTY CONCENTRATING, REMEMBERING OR MAKING DECISIONS: NO

## 2022-04-13 ASSESSMENT — ENCOUNTER SYMPTOMS
PSYCHIATRIC NEGATIVE: 1
SORE THROAT: 1
NEUROLOGICAL NEGATIVE: 1
COUGH: 1
CONSTITUTIONAL NEGATIVE: 1
GASTROINTESTINAL NEGATIVE: 1

## 2022-04-13 ASSESSMENT — PATIENT HEALTH QUESTIONNAIRE - PHQ9
CLINICAL INTERPRETATION OF PHQ2 SCORE: NO FURTHER SCREENING NEEDED
1. LITTLE INTEREST OR PLEASURE IN DOING THINGS: NOT AT ALL
SUM OF ALL RESPONSES TO PHQ9 QUESTIONS 1 AND 2: 0
2. FEELING DOWN, DEPRESSED OR HOPELESS: NOT AT ALL
SUM OF ALL RESPONSES TO PHQ9 QUESTIONS 1 AND 2: 0

## 2022-04-21 ENCOUNTER — TELEPHONE (OUTPATIENT)
Dept: SCHEDULING | Age: 72
End: 2022-04-21

## 2022-04-22 ENCOUNTER — LAB SERVICES (OUTPATIENT)
Dept: LAB | Age: 72
End: 2022-04-22
Attending: INTERNAL MEDICINE

## 2022-04-22 DIAGNOSIS — I25.10 ATHEROSCLEROTIC HEART DISEASE OF NATIVE CORONARY ARTERY WITHOUT ANGINA PECTORIS: Primary | ICD-10-CM

## 2022-04-22 DIAGNOSIS — Z95.5 PRESENCE OF CORONARY ANGIOPLASTY IMPLANT AND GRAFT: ICD-10-CM

## 2022-04-22 LAB
ALT SERPL-CCNC: 48 UNITS/L
ANION GAP SERPL CALC-SCNC: 15 MMOL/L (ref 10–20)
AST SERPL-CCNC: 35 UNITS/L
BUN SERPL-MCNC: 20 MG/DL (ref 6–20)
BUN/CREAT SERPL: 30 (ref 7–25)
CALCIUM SERPL-MCNC: 9 MG/DL (ref 8.4–10.2)
CHLORIDE SERPL-SCNC: 108 MMOL/L (ref 98–107)
CHOLEST SERPL-MCNC: 113 MG/DL
CHOLEST/HDLC SERPL: 3.2 {RATIO}
CO2 SERPL-SCNC: 24 MMOL/L (ref 21–32)
CREAT SERPL-MCNC: 0.66 MG/DL (ref 0.67–1.17)
FASTING DURATION TIME PATIENT: ABNORMAL H
FASTING DURATION TIME PATIENT: ABNORMAL H
GFR SERPLBLD BASED ON 1.73 SQ M-ARVRAT: >90 ML/MIN
GLUCOSE SERPL-MCNC: 95 MG/DL (ref 70–99)
HDLC SERPL-MCNC: 35 MG/DL
LDLC SERPL CALC-MCNC: 46 MG/DL
NONHDLC SERPL-MCNC: 78 MG/DL
POTASSIUM SERPL-SCNC: 4.3 MMOL/L (ref 3.4–5.1)
SODIUM SERPL-SCNC: 143 MMOL/L (ref 135–145)
TRIGL SERPL-MCNC: 161 MG/DL

## 2022-04-22 PROCEDURE — 84460 ALANINE AMINO (ALT) (SGPT): CPT

## 2022-04-22 PROCEDURE — 84450 TRANSFERASE (AST) (SGOT): CPT

## 2022-04-22 PROCEDURE — 36415 COLL VENOUS BLD VENIPUNCTURE: CPT

## 2022-04-22 PROCEDURE — 80048 BASIC METABOLIC PNL TOTAL CA: CPT

## 2022-04-22 PROCEDURE — 80061 LIPID PANEL: CPT

## 2022-04-25 ENCOUNTER — EXTERNAL RECORD (OUTPATIENT)
Dept: HEALTH INFORMATION MANAGEMENT | Facility: OTHER | Age: 72
End: 2022-04-25

## 2022-05-05 ENCOUNTER — EXTERNAL RECORD (OUTPATIENT)
Dept: HEALTH INFORMATION MANAGEMENT | Facility: OTHER | Age: 72
End: 2022-05-05

## 2022-07-20 ENCOUNTER — TELEPHONE (OUTPATIENT)
Dept: CALL CENTER | Facility: HOSPITAL | Age: 72
End: 2022-07-20

## 2022-07-22 ENCOUNTER — HOSPITAL ENCOUNTER (OUTPATIENT)
Dept: RADIOLOGY | Facility: HOSPITAL | Age: 72
Discharge: HOME | End: 2022-07-22
Payer: MEDICARE

## 2022-07-22 ENCOUNTER — CONSULT (OUTPATIENT)
Dept: ORTHOPEDIC SURGERY | Facility: CLINIC | Age: 72
End: 2022-07-22
Payer: MEDICARE

## 2022-07-22 VITALS
WEIGHT: 185 LBS | OXYGEN SATURATION: 94 % | RESPIRATION RATE: 22 BRPM | BODY MASS INDEX: 29.73 KG/M2 | HEART RATE: 77 BPM | HEIGHT: 66 IN | SYSTOLIC BLOOD PRESSURE: 132 MMHG | DIASTOLIC BLOOD PRESSURE: 84 MMHG

## 2022-07-22 DIAGNOSIS — M25.511 RIGHT SHOULDER PAIN, UNSPECIFIED CHRONICITY: ICD-10-CM

## 2022-07-22 DIAGNOSIS — M19.011 ARTHRITIS OF SHOULDER REGION, RIGHT: Primary | ICD-10-CM

## 2022-07-22 PROCEDURE — 6360000200 HC RX 636 W HCPCS (ALT 250 FOR IP): Performed by: ORTHOPAEDIC SURGERY

## 2022-07-22 PROCEDURE — 99203 OFFICE O/P NEW LOW 30 MIN: CPT | Performed by: ORTHOPAEDIC SURGERY

## 2022-07-22 PROCEDURE — 73030 X-RAY EXAM OF SHOULDER: CPT | Mod: 26,RT | Performed by: ORTHOPAEDIC SURGERY

## 2022-07-22 PROCEDURE — G0463 HOSPITAL OUTPT CLINIC VISIT: HCPCS | Performed by: ORTHOPAEDIC SURGERY

## 2022-07-22 PROCEDURE — 73030 X-RAY EXAM OF SHOULDER: CPT | Mod: RT

## 2022-07-22 RX ORDER — TRIAMCINOLONE ACETONIDE 40 MG/ML
80 INJECTION, SUSPENSION INTRA-ARTICULAR; INTRAMUSCULAR
Status: COMPLETED | OUTPATIENT
Start: 2022-07-22 | End: 2022-07-22

## 2022-07-22 RX ORDER — ROPIVACAINE HYDROCHLORIDE 5 MG/ML
INJECTION, SOLUTION EPIDURAL; INFILTRATION; PERINEURAL
Status: COMPLETED | OUTPATIENT
Start: 2022-07-22 | End: 2022-07-22

## 2022-07-22 RX ORDER — NAPROXEN SODIUM 220 MG
220 TABLET ORAL AS NEEDED
COMMUNITY
End: 2022-11-14 | Stop reason: ALTCHOICE

## 2022-07-22 RX ADMIN — TRIAMCINOLONE ACETONIDE 80 MG: 40 INJECTION, SUSPENSION INTRA-ARTICULAR; INTRAMUSCULAR at 11:14

## 2022-07-22 RX ADMIN — ROPIVACAINE HYDROCHLORIDE: 5 INJECTION EPIDURAL; INFILTRATION; PERINEURAL at 11:14

## 2022-07-22 ASSESSMENT — PAIN - FUNCTIONAL ASSESSMENT: PAIN_FUNCTIONAL_ASSESSMENT: 0-10

## 2022-07-22 ASSESSMENT — PAIN DESCRIPTION - DESCRIPTORS: DESCRIPTORS: SHARP;TIGHTNESS

## 2022-07-22 NOTE — LETTER
[facility] ORTHOPEDIC SURGERY  [address] [facility] [place] [address]  [phone]  Dept: [phone]  07/22/22      Wisam Seymour MD  [address] [facility] [place] [address]        To: Wisam Seymour MD      Thank you for referring your patient, Juan Corona, to receive care in my office. I have enclosed a summary of the care provided to Juan on 07/22/22.    Please contact me with any questions you may have regarding the visit.    Sincerely,         Unruly Neil MD  [address] [facility] [place] [address]  [phone]    CC: No Recipients

## 2022-07-22 NOTE — PROGRESS NOTES
Patient ID: Juan Corona    History of Present Illness:  This patient is a 72 y.o.  male here for evaluation treatment of right shoulder pain.  Patient states he had a rotator cuff repair done 5 years ago that was unsuccessful now having pain that has gotten significantly worse over the last 2 weeks no new injury that he can recall      Location right shoulder quality ache grind severity moderate to severe duration worse over the last weeks timing lifting reaching modifying factors none    Previous notes reviewed from 10/5/2021, no changes in PMH,SH,FH,PSH,or ROS    Previous radiographs from today have been personally review and agree with findings as documented    Past Medical History:  Past Medical History:   Diagnosis Date   • Arthritis    • Coronary artery disease     with stent    • Deep vein thrombosis (CMS/HCC) (HCC)     in left leg    • GERD (gastroesophageal reflux disease)    • High cholesterol    • History of transfusion    • Hypertension    • PONV (postoperative nausea and vomiting)    • Wears partial dentures     lower        Active Medications:  Current Outpatient Medications on File Prior to Visit   Medication Sig Dispense Refill   • metoprolol succinate XL (TOPROL-XL) 25 mg 24 hr tablet Take 1 tablet (25 mg total) by mouth daily 30 tablet 0   • ramipril (ALTACE) 2.5 mg capsule Take 1 capsule (2.5 mg total) by mouth daily 30 capsule 0   • ACETAMINOPHEN (TYLENOL ORAL) Take by mouth.     • atorvastatin (LIPITOR) 20 mg tablet Take 1 tablet (20 mg total) by mouth daily. 90 tablet 3   • aspirin 81 mg EC tablet daily.     • naproxen sodium (Aleve) 220 mg tablet Take 220 mg by mouth as needed for pain scale 1-3/10 Patient taking PRN for shoulder pain, R side     • UBIDECARENONE (COENZYME Q10 ORAL) coenzyme Q10       No current facility-administered medications on file prior to visit.       Medication Allergies:  No Known Allergies    Social History:  Social History     Tobacco Use   • Smoking status:  Never Smoker   • Smokeless tobacco: Never Used   Substance Use Topics   • Alcohol use: No   • Drug use: No       Family History:  Family History   Problem Relation Age of Onset   • Other Mother         Malignant tumor of breast   • Arthritis Father    • Arthritis Sister    • Arthritis Brother    • Arthritis Maternal Grandmother    • Stroke Paternal Grandmother    • Stroke Paternal Grandfather        Past Surgical History:  Past Surgical History:   Procedure Laterality Date   • HIP ARTHROPLASTY Left    • JOINT REPLACEMENT     • REPLACEMENT TOTAL KNEE BILATERAL Bilateral    • SHOULDER SURGERY  01/01/2015    RCR   • TENDON REPAIR Left 1/5/2018    Procedure: TENDON TRANSFER OF EXTENSOR INDICIS TO EXTENSOR POLLICIS LONGUS OF LEFT THUMB;  Surgeon: Nabil Wolfe DO;  Location: [facility];  Service: Orthopedics;  Laterality: Left;   • TOTAL HIP ARTHROPLASTY Left 03/21/2017    With Dr. Strong   • TOTAL HIP ARTHROPLASTY  03/21/2017   • WRIST SURGERY         Review of Systems:  Positive for  above     Constitutional: Negative for unexpected weight change, chills, fever, and fatigue.   Eyes: Negative for vision problems, dry eyes, eye pain, and watery eyes.   Ears/Nose/Throat: Negative for headache, difficulty swallowing, nose bleeds, ringing in the ears, and earaches   Cardiovascular: Negative for chest pain, palpitations, and fainting.   Respiratory: Negative for shortness of breath, pain with breathing, wheezing, cough, chest tightness, and snoring  Gastrointestinal: Negative for heartburn, nausea or vomiting, constipation, diarrhea, and bloody or tarry stools   Genitourinary: Negative for urinary frequency, urinary urgency, urinary bleeding, difficult or painful urination, and flank pain.  Musculoskeletal: Right shoulder pain  Skin: Negative for skin changes, poor healing, rashes, itching, and redness.  Neurological: Negative for numbness or tingling, unsteady gait, dizziness, tremors, and seizures.  Hematologic: Negative for easy  or excessive bleeding and easy bruising  Endocrine: Negative for excessive thirst or urination and heat or cold intolerance  Allergic:  Negative for reaction to foods or environment.     Physical Examination:  Vitals:    07/22/22 1034   BP: 132/84   Pulse: 77   Resp: 22   SpO2: 94%     General: Awake and alert, Oriented ×3, in no acute distress.No Constitutional symptoms.  Respiratory: Nonlabored breathing Cardio: normal pulses Gi: no complaints of abdominal pain Skin: no lesions or open wounds Neuro : normal sensation Psych: appropriate responses Heme: no immuno suppression    Orthopedic exam: Very pleasant gentleman inspection of the shoulder shows no evidence of redness or swelling no deformity the AC joint of the biceps, he has difficulty with forward flexion and abduction past 110 degrees there is palpable crepitus with range of motion and weakness and pain with supraspinatus strength testing    Radiology:  RADIOGRAPH: Radiograph shows severe osteoarthritis        Diagnosis:  Rotator cuff arthropathy right shoulder    Discussion and Plan:  Discussed with patient that options are injection management for pain versus shoulder arthroplasty    Decided on trying the injection today and seeing how much pain relief and how long it lasts    Patient also does having some lower back pain which radiates into his hips and thighs and given a business card to contact Dr. Maravilla if he wishes to have his lower back pain evaluated    A voice recognition program was used to aid in documentation of this record. Sometimes words are not printed exactly as they were spoken.  While efforts were made to carefully edit and correct any inaccuracies, some errors may be present; please take these into context.  Please contact the provider's office if you have any questions or concerns          Unruly Neil MD

## 2022-07-22 NOTE — PROGRESS NOTES
Large Jt Arthrocentesis/Injection: R glenohumeral on 7/22/2022 11:14 AM  Indications: pain  Details: 22 G needle, lateral approach  Medications: ropivacaine 5 mg/mL (0.5 %); 80 mg triamcinolone acetonide 40 mg/mL    Right shoulder injection  8 cc of ropivacaine 0.5%    [id number]  Procedure, treatment alternatives, risks and benefits explained, specific risks discussed. Consent was given by the patient. Immediately prior to procedure a time out was called to verify the correct patient, procedure, equipment, support staff and site/side marked as required. Patient was prepped and draped in the usual sterile fashion.

## 2022-10-03 ENCOUNTER — EXTERNAL RECORD (OUTPATIENT)
Dept: HEALTH INFORMATION MANAGEMENT | Facility: OTHER | Age: 72
End: 2022-10-03

## 2022-11-14 ENCOUNTER — OFFICE VISIT (OUTPATIENT)
Dept: URGENT CARE | Facility: CLINIC | Age: 72
End: 2022-11-14
Payer: MEDICARE

## 2022-11-14 VITALS
DIASTOLIC BLOOD PRESSURE: 85 MMHG | RESPIRATION RATE: 18 BRPM | OXYGEN SATURATION: 95 % | SYSTOLIC BLOOD PRESSURE: 145 MMHG | HEART RATE: 77 BPM | TEMPERATURE: 97.8 F

## 2022-11-14 DIAGNOSIS — B02.29 POSTHERPETIC NEURALGIA: Primary | ICD-10-CM

## 2022-11-14 DIAGNOSIS — B02.9 ACUTE PAIN ASSOCIATED WITH HERPES ZOSTER: ICD-10-CM

## 2022-11-14 PROCEDURE — G0463 HOSPITAL OUTPT CLINIC VISIT: HCPCS | Performed by: NURSE PRACTITIONER

## 2022-11-14 PROCEDURE — 99213 OFFICE O/P EST LOW 20 MIN: CPT | Performed by: NURSE PRACTITIONER

## 2022-11-14 RX ORDER — ATORVASTATIN CALCIUM 20 MG/1
20 TABLET, FILM COATED ORAL DAILY
COMMUNITY
End: 2023-05-01 | Stop reason: SDUPTHER

## 2022-11-14 RX ORDER — GABAPENTIN 100 MG/1
100 CAPSULE ORAL 3 TIMES DAILY
Qty: 30 CAPSULE | Refills: 0 | Status: SHIPPED | OUTPATIENT
Start: 2022-11-14 | End: 2023-05-01 | Stop reason: ALTCHOICE

## 2022-11-14 RX ORDER — PNV NO.95/FERROUS FUM/FOLIC AC 28MG-0.8MG
1 TABLET ORAL DAILY
COMMUNITY
End: 2024-04-26 | Stop reason: ALTCHOICE

## 2022-11-14 RX ORDER — ICOSAPENT ETHYL 1 G/1
1 CAPSULE ORAL 2 TIMES DAILY
COMMUNITY
Start: 2022-10-26 | End: 2023-05-01 | Stop reason: ALTCHOICE

## 2022-11-14 RX ORDER — METOPROLOL SUCCINATE 25 MG/1
25 TABLET, EXTENDED RELEASE ORAL DAILY
COMMUNITY
End: 2023-05-01 | Stop reason: SDUPTHER

## 2022-11-14 RX ORDER — ACETAMINOPHEN 160 MG/5ML
200 SUSPENSION, ORAL (FINAL DOSE FORM) ORAL DAILY
COMMUNITY
End: 2023-05-01 | Stop reason: ALTCHOICE

## 2022-11-14 RX ORDER — VALACYCLOVIR HYDROCHLORIDE 1 G/1
1000 TABLET, FILM COATED ORAL 3 TIMES DAILY
Qty: 21 TABLET | Refills: 0 | Status: SHIPPED | OUTPATIENT
Start: 2022-11-14 | End: 2023-05-01 | Stop reason: ALTCHOICE

## 2022-11-14 ASSESSMENT — ENCOUNTER SYMPTOMS
SORE THROAT: 0
DIARRHEA: 0
COUGH: 0
EYE PAIN: 0
SHORTNESS OF BREATH: 0
FEVER: 0
CHEST TIGHTNESS: 0
ACTIVITY CHANGE: 0
NAUSEA: 0
VOMITING: 0
NECK STIFFNESS: 0
SINUS PAIN: 0
TROUBLE SWALLOWING: 0
CHOKING: 0
BACK PAIN: 0
ABDOMINAL PAIN: 0
NECK PAIN: 0

## 2022-11-14 NOTE — PROGRESS NOTES
Subjective      Juan Corona is a 72 y.o. male who presents for he thinks he may have shingles.  He has had pain extending from mid lumbar back around to lower flank area.  Has been ongoing for 5 days.  He states he had shingles when he was living in [place].  It is and was of the same location as today's pain is.  Also felt as what it does today.  He was treated with 2 medications at that time one of them was gabapentin.  He did have a rash at that time.  He states the gabapentin was very helpful for his pain.  He states no back injury.      HPI    The following have been reviewed and updated as appropriate in this visit:          No Known Allergies  Current Outpatient Medications   Medication Sig Dispense Refill    cyanocobalamin 100 mcg tablet Take 1 tablet by mouth daily      icosapent ethyL (VASCEPA) 1 gram capsule capsule Take 1 g by mouth 2 (two) times a day      atorvastatin (LIPITOR) 20 mg tablet Take 20 mg by mouth daily      metoprolol succinate XL (TOPROL-XL) 25 mg 24 hr tablet Take 25 mg by mouth daily      coenzyme Q10 (CO Q-10) 200 mg capsule Take 200 mg by mouth daily      ramipril (ALTACE) 2.5 mg capsule Take 1 capsule (2.5 mg total) by mouth daily 30 capsule 0    aspirin 81 mg EC tablet daily.      valACYclovir (Valtrex) 1 gram tablet Take 1 tablet (1,000 mg total) by mouth 3 (three) times a day for 7 days 21 tablet 0    gabapentin (NEURONTIN) 100 mg capsule Take 1 capsule (100 mg total) by mouth 3 (three) times a day for 10 days 30 capsule 0     No current facility-administered medications for this visit.     Past Medical History:   Diagnosis Date    Arthritis     Coronary artery disease     with stent     Deep vein thrombosis (CMS/HCC) (HCC)     in left leg     GERD (gastroesophageal reflux disease)     High cholesterol     History of transfusion     Hypertension     PONV (postoperative nausea and vomiting)     Wears partial dentures     lower      Past Surgical History:   Procedure  Laterality Date    HIP ARTHROPLASTY Left     JOINT REPLACEMENT      REPLACEMENT TOTAL KNEE BILATERAL Bilateral     SHOULDER SURGERY  01/01/2015    RCR    TENDON REPAIR Left 1/5/2018    Procedure: TENDON TRANSFER OF EXTENSOR INDICIS TO EXTENSOR POLLICIS LONGUS OF LEFT THUMB;  Surgeon: Nabil Wolfe DO;  Location: [facility] OR;  Service: Orthopedics;  Laterality: Left;    TOTAL HIP ARTHROPLASTY Left 03/21/2017    With Dr. Strong    TOTAL HIP ARTHROPLASTY  03/21/2017    WRIST SURGERY       Family History   Problem Relation Age of Onset    Other Mother         Malignant tumor of breast    Arthritis Father     Arthritis Sister     Arthritis Brother     Arthritis Maternal Grandmother     Stroke Paternal Grandmother     Stroke Paternal Grandfather      Social History     Socioeconomic History    Marital status: Single   Tobacco Use    Smoking status: Never    Smokeless tobacco: Never   Substance and Sexual Activity    Alcohol use: No    Drug use: No     Social Determinants of Health     Tobacco Use: Low Risk     Smoking Tobacco Use: Never    Smokeless Tobacco Use: Never       Review of Systems   Constitutional:  Negative for activity change and fever.   HENT:  Negative for congestion, ear pain, sinus pain, sore throat and trouble swallowing.    Eyes:  Negative for pain.   Respiratory:  Negative for cough, choking, chest tightness and shortness of breath.    Cardiovascular:  Negative for chest pain.   Gastrointestinal:  Negative for abdominal pain, diarrhea, nausea and vomiting.   Musculoskeletal:  Negative for back pain, neck pain and neck stiffness.        Pain is located on his back, however it is postherpetic or herpes zoster type pain.  Feels the same as it had previously.   Skin:  Negative for rash.     Objective   /85   Pulse 77   Temp 36.6 °C (97.8 °F) (Temporal)   Resp 18   SpO2 95%     Physical Exam  Constitutional:       General: He is not in acute distress.     Appearance: Normal appearance.   HENT:      Head:  Normocephalic.   Eyes:      General:         Right eye: No discharge.         Left eye: No discharge.      Extraocular Movements: Extraocular movements intact.   Cardiovascular:      Rate and Rhythm: Normal rate and regular rhythm.      Heart sounds: Normal heart sounds. No murmur heard.  Pulmonary:      Effort: Pulmonary effort is normal. No respiratory distress.      Breath sounds: Normal breath sounds. No stridor. No wheezing, rhonchi or rales.   Chest:      Chest wall: No tenderness.   Musculoskeletal:         General: Normal range of motion.      Cervical back: Normal range of motion and neck supple. No rigidity.   Skin:     General: Skin is warm and dry.      Comments: L1,  L2 and corresponding flank area is tender.  Patient states feels tingly and painful.  No rash.  Painful with palpation.   Neurological:      Mental Status: He is alert.       No results found for this or any previous visit (from the past 24 hour(s)).    Assessment/Plan   Diagnoses and all orders for this visit:    Postherpetic neuralgia    Acute pain associated with herpes zoster  -     valACYclovir (Valtrex) 1 gram tablet; Take 1 tablet (1,000 mg total) by mouth 3 (three) times a day for 7 days  -     gabapentin (NEURONTIN) 100 mg capsule; Take 1 capsule (100 mg total) by mouth 3 (three) times a day for 10 days    No rash noted today.  However given his history decided to prescribe for him today.  Advised that gabapentin can make you drowsy and to not do any safety sensitive activities while taking.  There are no Patient Instructions on file for this visit.    Lorna Oliva, CNP

## 2023-02-14 ENCOUNTER — TELEPHONE (OUTPATIENT)
Dept: FAMILY MEDICINE | Facility: CLINIC | Age: 73
End: 2023-02-14
Payer: MEDICARE

## 2023-02-14 NOTE — TELEPHONE ENCOUNTER
We could see him.  If he needs to be seen earlier than July just have him schedule for 30-minute appointment.  Thanks

## 2023-02-14 NOTE — TELEPHONE ENCOUNTER
Pt will be back in [place] at the end of April. He hasn't seen Dr. Seymour since 2017, but new patient appts are out to July. Is Dr. Seymour willing to continue care with this patient as established or does he need to establish with someone else? Please call Pt and leave message if unavailable.

## 2023-02-23 ENCOUNTER — TELEPHONE (OUTPATIENT)
Dept: FAMILY MEDICINE | Age: 73
End: 2023-02-23

## 2023-02-23 ENCOUNTER — TELEPHONE (OUTPATIENT)
Dept: FAMILY MEDICINE | Facility: CLINIC | Age: 73
End: 2023-02-23
Payer: MEDICARE

## 2023-02-23 DIAGNOSIS — I25.10 CORONARY ARTERY DISEASE INVOLVING NATIVE CORONARY ARTERY OF NATIVE HEART WITHOUT ANGINA PECTORIS: Primary | ICD-10-CM

## 2023-02-23 DIAGNOSIS — I25.10 ATHEROSCLEROSIS OF NATIVE CORONARY ARTERY OF NATIVE HEART WITHOUT ANGINA PECTORIS: Primary | ICD-10-CM

## 2023-02-23 DIAGNOSIS — E78.5 HYPERLIPIDEMIA, UNSPECIFIED HYPERLIPIDEMIA TYPE: ICD-10-CM

## 2023-02-23 NOTE — TELEPHONE ENCOUNTER
Juan is in [place] and will not be back until May.  He has an appointment with Dr Seymour May 2nd.  He had a heart stent placed 10 years ago and he has been seeing a Cardiologist every 6 months since. He would like Dr Seymour to refer him to Cardiology so he can have an appointment with them in May also.  He stated that he is due to see a cardiologist in April.    I explained that Dr Seymour would need to see him before sending a referral to cardiology.  He insisted that the Dr was asked to do the referral.    Please advise.

## 2023-02-23 NOTE — TELEPHONE ENCOUNTER
Pt wondering if he can get a referral to a cardiologist before his appt in May. His appt in May is for a general checkup. Please call back and let him know if he can get a referral.

## 2023-04-17 ENCOUNTER — TELEPHONE (OUTPATIENT)
Dept: FAMILY MEDICINE | Age: 73
End: 2023-04-17

## 2023-04-28 ENCOUNTER — APPOINTMENT (OUTPATIENT)
Dept: FAMILY MEDICINE | Age: 73
End: 2023-04-28

## 2023-05-01 ENCOUNTER — OFFICE VISIT (OUTPATIENT)
Dept: FAMILY MEDICINE | Facility: CLINIC | Age: 73
End: 2023-05-01
Payer: MEDICARE

## 2023-05-01 ENCOUNTER — LAB (OUTPATIENT)
Dept: LAB | Facility: CLINIC | Age: 73
End: 2023-05-01
Payer: MEDICARE

## 2023-05-01 VITALS
HEIGHT: 66 IN | OXYGEN SATURATION: 95 % | WEIGHT: 196 LBS | DIASTOLIC BLOOD PRESSURE: 82 MMHG | TEMPERATURE: 97.8 F | HEART RATE: 64 BPM | BODY MASS INDEX: 31.5 KG/M2 | SYSTOLIC BLOOD PRESSURE: 132 MMHG

## 2023-05-01 DIAGNOSIS — I25.10 CORONARY ARTERY DISEASE INVOLVING NATIVE CORONARY ARTERY OF NATIVE HEART WITHOUT ANGINA PECTORIS: ICD-10-CM

## 2023-05-01 DIAGNOSIS — R05.3 CHRONIC COUGH: ICD-10-CM

## 2023-05-01 DIAGNOSIS — B02.29 POST HERPETIC NEURALGIA: ICD-10-CM

## 2023-05-01 DIAGNOSIS — D69.6 THROMBOCYTOPENIA (CMS/HCC): Primary | ICD-10-CM

## 2023-05-01 PROBLEM — R35.1 BPH ASSOCIATED WITH NOCTURIA: Status: ACTIVE | Noted: 2019-05-09

## 2023-05-01 PROBLEM — S52.502D CLOSED FRACTURE OF LOWER END OF LEFT RADIUS WITH ROUTINE HEALING: Status: RESOLVED | Noted: 2018-08-22 | Resolved: 2023-05-01

## 2023-05-01 PROBLEM — N40.1 BPH ASSOCIATED WITH NOCTURIA: Status: ACTIVE | Noted: 2019-05-09

## 2023-05-01 PROBLEM — M25.562 CHRONIC PAIN OF LEFT KNEE: Status: RESOLVED | Noted: 2018-08-22 | Resolved: 2023-05-01

## 2023-05-01 PROBLEM — F41.9 ANXIETY DISORDER: Status: ACTIVE | Noted: 2019-05-09

## 2023-05-01 PROBLEM — M97.9XXA: Status: RESOLVED | Noted: 2018-08-22 | Resolved: 2023-05-01

## 2023-05-01 PROBLEM — G89.29 CHRONIC PAIN OF LEFT KNEE: Status: RESOLVED | Noted: 2018-08-22 | Resolved: 2023-05-01

## 2023-05-01 PROBLEM — F41.9 ANXIETY DISORDER: Status: RESOLVED | Noted: 2019-05-09 | Resolved: 2023-05-01

## 2023-05-01 PROBLEM — Z96.652 HISTORY OF TOTAL KNEE ARTHROPLASTY, LEFT: Status: RESOLVED | Noted: 2018-08-22 | Resolved: 2023-05-01

## 2023-05-01 LAB
ALBUMIN SERPL-MCNC: 4.8 G/DL (ref 3.5–5.3)
ALP SERPL-CCNC: 85 U/L (ref 45–115)
ALT SERPL-CCNC: 25 U/L (ref 7–52)
ANION GAP SERPL CALC-SCNC: 9 MMOL/L (ref 3–11)
AST SERPL-CCNC: 24 U/L
BASOPHILS # BLD AUTO: 0 10*3/UL
BASOPHILS NFR BLD AUTO: 0.6 % (ref 0–2)
BILIRUB SERPL-MCNC: 1.12 MG/DL (ref 0.2–1.4)
BUN SERPL-MCNC: 19 MG/DL (ref 7–25)
CALCIUM ALBUM COR SERPL-MCNC: 9.7 MG/DL (ref 8.6–10.3)
CALCIUM SERPL-MCNC: 10.3 MG/DL (ref 8.6–10.3)
CHLORIDE SERPL-SCNC: 106 MMOL/L (ref 98–107)
CHOLEST SERPL-MCNC: 107 MG/DL (ref 0–199)
CO2 SERPL-SCNC: 25 MMOL/L (ref 21–32)
CREAT SERPL-MCNC: 0.83 MG/DL (ref 0.7–1.3)
EOSINOPHIL # BLD AUTO: 0.2 10*3/UL
EOSINOPHIL NFR BLD AUTO: 2.4 % (ref 0–3)
ERYTHROCYTE [DISTWIDTH] IN BLOOD BY AUTOMATED COUNT: 15.7 % (ref 11.5–15)
FASTING STATUS PATIENT QL REPORTED: YES
GFR SERPL CREATININE-BSD FRML MDRD: 94 ML/MIN/1.73M*2
GLUCOSE SERPL-MCNC: 104 MG/DL (ref 70–105)
HCT VFR BLD AUTO: 43.3 % (ref 38–50)
HDLC SERPL-MCNC: 38 MG/DL
HGB BLD-MCNC: 14.3 G/DL (ref 13.2–17.2)
LDLC SERPL CALC-MCNC: 43 MG/DL (ref 20–99)
LYMPHOCYTES # BLD AUTO: 1.6 10*3/UL
LYMPHOCYTES NFR BLD AUTO: 26.1 % (ref 15–47)
MCH RBC QN AUTO: 28.7 PG (ref 29–34)
MCHC RBC AUTO-ENTMCNC: 33 G/DL (ref 32–36)
MCV RBC AUTO: 86.9 FL (ref 82–97)
MONOCYTES # BLD AUTO: 0.7 10*3/UL
MONOCYTES NFR BLD AUTO: 10.8 % (ref 5–13)
NEUTROPHILS # BLD AUTO: 3.8 10*3/UL
NEUTROPHILS NFR BLD AUTO: 60.1 % (ref 46–70)
PLATELET # BLD AUTO: 140 10*3/UL (ref 130–350)
PMV BLD AUTO: 10.2 FL (ref 6.9–10.8)
POTASSIUM SERPL-SCNC: 4.3 MMOL/L (ref 3.5–5.1)
PROT SERPL-MCNC: 7.5 G/DL (ref 6–8.3)
RBC # BLD AUTO: 4.98 10*6/ΜL (ref 4.1–5.8)
SODIUM SERPL-SCNC: 140 MMOL/L (ref 135–145)
TRIGL SERPL-MCNC: 128 MG/DL
WBC # BLD AUTO: 6.3 10*3/UL (ref 3.7–9.6)

## 2023-05-01 PROCEDURE — 85025 COMPLETE CBC W/AUTO DIFF WBC: CPT | Performed by: FAMILY MEDICINE

## 2023-05-01 PROCEDURE — 36415 COLL VENOUS BLD VENIPUNCTURE: CPT | Performed by: FAMILY MEDICINE

## 2023-05-01 PROCEDURE — G0463 HOSPITAL OUTPT CLINIC VISIT: HCPCS | Performed by: FAMILY MEDICINE

## 2023-05-01 PROCEDURE — 80061 LIPID PANEL: CPT | Performed by: FAMILY MEDICINE

## 2023-05-01 PROCEDURE — 99214 OFFICE O/P EST MOD 30 MIN: CPT | Performed by: FAMILY MEDICINE

## 2023-05-01 PROCEDURE — 80053 COMPREHEN METABOLIC PANEL: CPT | Performed by: FAMILY MEDICINE

## 2023-05-01 RX ORDER — METOPROLOL SUCCINATE 25 MG/1
25 TABLET, EXTENDED RELEASE ORAL DAILY
Qty: 90 TABLET | Refills: 4 | Status: SHIPPED | OUTPATIENT
Start: 2023-05-01 | End: 2024-05-28

## 2023-05-01 RX ORDER — ATORVASTATIN CALCIUM 20 MG/1
20 TABLET, FILM COATED ORAL DAILY
Qty: 90 TABLET | Refills: 4 | Status: SHIPPED | OUTPATIENT
Start: 2023-05-01 | End: 2024-05-13

## 2023-05-01 RX ORDER — RAMIPRIL 2.5 MG/1
2.5 CAPSULE ORAL DAILY
Qty: 90 CAPSULE | Refills: 4 | Status: SHIPPED | OUTPATIENT
Start: 2023-05-01 | End: 2024-05-13

## 2023-05-01 ASSESSMENT — ENCOUNTER SYMPTOMS
ROS GI COMMENTS: HEARTBURN
VOMITING: 0
NERVOUS/ANXIOUS: 0
PALPITATIONS: 0
ADENOPATHY: 0
FATIGUE: 0
FEVER: 0
BACK PAIN: 0
DYSPHORIC MOOD: 0
SHORTNESS OF BREATH: 0
POLYDIPSIA: 0
WOUND: 0
RHINORRHEA: 0
SLEEP DISTURBANCE: 0
CONSTIPATION: 0
NUMBNESS: 0
EYE REDNESS: 0
WEAKNESS: 0
EYE DISCHARGE: 0
JOINT SWELLING: 0
VOICE CHANGE: 0
UNEXPECTED WEIGHT CHANGE: 0
DIAPHORESIS: 0
TROUBLE SWALLOWING: 0
DIZZINESS: 0
DIFFICULTY URINATING: 0
ARTHRALGIAS: 1
EYE PAIN: 0
DIARRHEA: 0
NAUSEA: 0
FREQUENCY: 0
CHEST TIGHTNESS: 0
COUGH: 1
ABDOMINAL PAIN: 0
DYSURIA: 0
APPETITE CHANGE: 0
HEMATURIA: 0
CHILLS: 0
SORE THROAT: 0
HEADACHES: 0
WHEEZING: 0
SINUS PRESSURE: 0
MYALGIAS: 0
BRUISES/BLEEDS EASILY: 0
BLOOD IN STOOL: 0

## 2023-05-01 ASSESSMENT — PAIN SCALES - GENERAL: PAINLEVEL: 3

## 2023-05-01 NOTE — PROGRESS NOTES
Subjective      Juan Corona is a 72 y.o. male who presents for Med Refill ([facility] in [place]. ), Immunizations (Discuss tetanus-unsure of last, shingles-he has had 1st shot (he has a hx of shingles) next shot after May 20th, pneumonia-has had one in March, covid-has had 3. ), URI (Patient had a cold while traveling home from [place] in end of March. He had the cold for 2-3 weeks. He now is having a coughing spell once a day. Dry, not phlegm. ), and Other (May need labwork. Patient will be seeing Cardiologist in May. )  .    HPI  Patient with history of coronary disease status post stenting about a decade ago.  He is done quite well with no acute issues.  He is due for repeat labs today.  He is requesting refill of medications.  No acute issues.    Patient also with a pretty significant upper respiratory infection in March that has significantly improved but he still has the significant coughing spells every once in a while.  He does seem to bring up some mucus with this.  He denies any shortness of breath or chest pain.    Patient also with shingles about a year ago.  He has had some intermittent postherpetic neuralgia of his right flank since then.  This does seem to be improved with the use of lidocaine patches.    Patient also with a history of chronic thrombocytopenia.  He is due for repeat platelets today.    The following have been reviewed and updated as appropriate in this visit:   Tobacco  Allergies  Problems  Med Hx  Surg Hx  Fam Hx         No Known Allergies  Current Outpatient Medications   Medication Sig Dispense Refill    cyanocobalamin 100 mcg tablet Take 1 tablet (100 mcg total) by mouth daily      aspirin 81 mg EC tablet daily.      ramipriL (ALTACE) 2.5 mg capsule Take 1 capsule (2.5 mg total) by mouth daily 90 capsule 4    atorvastatin (LIPITOR) 20 mg tablet Take 1 tablet (20 mg total) by mouth daily 90 tablet 4    metoprolol succinate XL (TOPROL-XL) 25 mg 24 hr tablet Take 1  tablet (25 mg total) by mouth daily 90 tablet 4     No current facility-administered medications for this visit.     Past Medical History:   Diagnosis Date    Arthritis     Coronary artery disease     with stent     Deep vein thrombosis (CMS/HCC)     in left leg     GERD (gastroesophageal reflux disease)     High cholesterol     History of transfusion     Hypertension     PONV (postoperative nausea and vomiting)     Wears partial dentures     lower      Past Surgical History:   Procedure Laterality Date    HIP ARTHROPLASTY Left     JOINT REPLACEMENT      REPLACEMENT TOTAL KNEE BILATERAL Bilateral     SHOULDER SURGERY  01/01/2015    RCR    TENDON REPAIR Left 1/5/2018    Procedure: TENDON TRANSFER OF EXTENSOR INDICIS TO EXTENSOR POLLICIS LONGUS OF LEFT THUMB;  Surgeon: Nabil Wolfe DO;  Location: [facility];  Service: Orthopedics;  Laterality: Left;    TOTAL HIP ARTHROPLASTY Left 03/21/2017    With Dr. Strong    TOTAL HIP ARTHROPLASTY  03/21/2017    WRIST SURGERY       Family History   Problem Relation Age of Onset    Other Mother         Malignant tumor of breast    Arthritis Father     Arthritis Sister     Arthritis Brother     Arthritis Maternal Grandmother     Stroke Paternal Grandmother     Stroke Paternal Grandfather      Social History     Socioeconomic History    Marital status: Single   Tobacco Use    Smoking status: Never    Smokeless tobacco: Never   Substance and Sexual Activity    Alcohol use: No    Drug use: No     Social Determinants of Health     Tobacco Use: Low Risk     Smoking Tobacco Use: Never    Smokeless Tobacco Use: Never       Review of Systems   Constitutional:  Negative for appetite change, chills, diaphoresis, fatigue, fever and unexpected weight change.   HENT:  Negative for congestion, dental problem, ear discharge, ear pain, hearing loss, mouth sores, nosebleeds, rhinorrhea, sinus pressure, sore throat, tinnitus, trouble swallowing and voice change.    Eyes:  Negative for pain, discharge,  "redness and visual disturbance.   Respiratory:  Positive for cough. Negative for chest tightness, shortness of breath and wheezing.    Cardiovascular:  Negative for chest pain, palpitations and leg swelling.   Gastrointestinal:  Negative for abdominal pain, blood in stool, constipation, diarrhea, nausea and vomiting.        Heartburn   Endocrine: Negative for cold intolerance, heat intolerance, polydipsia and polyuria.   Genitourinary:  Negative for difficulty urinating, dysuria, frequency and hematuria.   Musculoskeletal:  Positive for arthralgias. Negative for back pain, joint swelling and myalgias.   Skin:  Negative for rash and wound.   Allergic/Immunologic: Negative for environmental allergies.   Neurological:  Negative for dizziness, weakness, numbness and headaches.   Hematological:  Negative for adenopathy. Does not bruise/bleed easily.   Psychiatric/Behavioral:  Negative for behavioral problems, dysphoric mood and sleep disturbance. The patient is not nervous/anxious.      Objective     Vitals  /82 (BP Location: Left arm, Patient Position: Sitting, Cuff Size: Regular Adult)   Pulse 64   Temp 36.6 °C (97.8 °F) (Temporal)   Ht 1.676 m (5' 6\")   Wt 88.9 kg (196 lb)   SpO2 95%   BMI 31.64 kg/m²     Physical Exam  Vitals and nursing note reviewed.   Constitutional:       General: He is not in acute distress.     Appearance: Normal appearance. He is well-developed. He is not ill-appearing, toxic-appearing or diaphoretic.   HENT:      Head: Normocephalic and atraumatic.      Right Ear: Tympanic membrane, ear canal and external ear normal. There is no impacted cerumen.      Left Ear: Tympanic membrane, ear canal and external ear normal. There is no impacted cerumen.      Nose: Nose normal. No congestion or rhinorrhea.      Mouth/Throat:      Mouth: Mucous membranes are moist.      Pharynx: Oropharynx is clear. No oropharyngeal exudate or posterior oropharyngeal erythema.   Eyes:      General: No scleral " icterus.        Right eye: No discharge.         Left eye: No discharge.      Conjunctiva/sclera: Conjunctivae normal.   Neck:      Thyroid: No thyromegaly.      Vascular: No carotid bruit.      Trachea: No tracheal deviation.   Cardiovascular:      Rate and Rhythm: Normal rate and regular rhythm.      Pulses: Normal pulses.      Heart sounds: Normal heart sounds. No murmur heard.    No friction rub. No gallop.   Pulmonary:      Effort: Pulmonary effort is normal. No respiratory distress.      Breath sounds: Normal breath sounds. No stridor. No wheezing, rhonchi or rales.   Chest:      Chest wall: No tenderness.   Abdominal:      General: Bowel sounds are normal. There is no distension.      Palpations: Abdomen is soft. There is no mass.      Tenderness: There is no abdominal tenderness. There is no guarding or rebound.      Hernia: No hernia is present.   Musculoskeletal:      Cervical back: Neck supple.      Right lower leg: No edema.      Left lower leg: No edema.   Lymphadenopathy:      Cervical: No cervical adenopathy.   Skin:     General: Skin is warm and dry.      Coloration: Skin is not pale.      Findings: No erythema or rash.   Neurological:      Mental Status: He is alert.      Gait: Gait normal.      Deep Tendon Reflexes: Reflexes are normal and symmetric.   Psychiatric:         Mood and Affect: Mood normal.       Procedures    Assessment/Plan   Diagnoses and all orders for this visit:    Thrombocytopenia (CMS/HCC)  Comments:  Repeat CBC today.  Follow pending results.    Chronic cough  Comments:  Likely postviral cough.  Return precautions discussed.  Patient can follow-up as needed.    Coronary artery disease involving native coronary artery of native heart without angina pectoris  Comments:  Continue current medications.  Repeat lipids today.  Follow-up pending results.  Orders:  -     CBC w/auto differential Blood, Venous  -     Comprehensive metabolic panel Blood, Venous  -     Lipid panel Blood,  Venous  -     ramipriL (ALTACE) 2.5 mg capsule; Take 1 capsule (2.5 mg total) by mouth daily  -     atorvastatin (LIPITOR) 20 mg tablet; Take 1 tablet (20 mg total) by mouth daily  -     metoprolol succinate XL (TOPROL-XL) 25 mg 24 hr tablet; Take 1 tablet (25 mg total) by mouth daily    Post herpetic neuralgia  Comments:  Continue lidocaine patches as needed.  He will get second shingles shot next month.  Follow-up as needed.        Return in about 6 months (around 11/1/2023) for Physical.    Wisam Seymour MD    Portions of this note were documented by Dolores Deleon as I performed the exam and collected the information from Juan Corona. I attest that I have reviewed the information as documented, verified the accuracy of the documentation and added additional information as needed.

## 2023-05-05 DIAGNOSIS — I25.10 CORONARY ARTERY DISEASE INVOLVING NATIVE HEART, UNSPECIFIED VESSEL OR LESION TYPE, UNSPECIFIED WHETHER ANGINA PRESENT: Primary | ICD-10-CM

## 2023-05-10 ENCOUNTER — ANCILLARY PROCEDURE (OUTPATIENT)
Dept: CARDIOLOGY | Facility: CLINIC | Age: 73
End: 2023-05-10
Payer: MEDICARE

## 2023-05-10 ENCOUNTER — OFFICE VISIT (OUTPATIENT)
Dept: CARDIOLOGY | Facility: CLINIC | Age: 73
End: 2023-05-10
Payer: MEDICARE

## 2023-05-10 VITALS
WEIGHT: 197.5 LBS | SYSTOLIC BLOOD PRESSURE: 126 MMHG | HEART RATE: 78 BPM | BODY MASS INDEX: 31.74 KG/M2 | DIASTOLIC BLOOD PRESSURE: 84 MMHG | HEIGHT: 66 IN | OXYGEN SATURATION: 94 %

## 2023-05-10 DIAGNOSIS — I25.10 CORONARY ARTERY DISEASE INVOLVING NATIVE HEART, UNSPECIFIED VESSEL OR LESION TYPE, UNSPECIFIED WHETHER ANGINA PRESENT: ICD-10-CM

## 2023-05-10 DIAGNOSIS — G47.33 OBSTRUCTIVE SLEEP APNEA: ICD-10-CM

## 2023-05-10 DIAGNOSIS — I25.10 CORONARY ARTERY DISEASE WITHOUT ANGINA PECTORIS, UNSPECIFIED VESSEL OR LESION TYPE, UNSPECIFIED WHETHER NATIVE OR TRANSPLANTED HEART: Primary | ICD-10-CM

## 2023-05-10 PROCEDURE — 99203 OFFICE O/P NEW LOW 30 MIN: CPT | Performed by: INTERNAL MEDICINE

## 2023-05-10 PROCEDURE — 93005 ELECTROCARDIOGRAM TRACING: CPT | Performed by: INTERNAL MEDICINE

## 2023-05-10 PROCEDURE — G0463 HOSPITAL OUTPT CLINIC VISIT: HCPCS | Performed by: INTERNAL MEDICINE

## 2023-05-10 ASSESSMENT — ENCOUNTER SYMPTOMS
PALPITATIONS: 0
SNORING: 1
LIGHT-HEADEDNESS: 0
DIZZINESS: 0
SHORTNESS OF BREATH: 0
IRREGULAR HEARTBEAT: 0
SLEEP DISTURBANCES DUE TO BREATHING: 0

## 2023-05-10 ASSESSMENT — PAIN SCALES - GENERAL: PAINLEVEL: 5

## 2023-05-10 NOTE — PROGRESS NOTES
CARDIOLOGY OUTPATIENT CONSULT NOTE    Subjective    Patient ID: Juan Corona is a 72 y.o. male.    Chief Complaint:   Chief Complaint   Patient presents with    Coronary Artery Disease    Hyperlipidemia       Coronary Artery Disease  Pertinent negatives include no chest pain, dizziness, leg swelling, palpitations or shortness of breath. Risk factors include hyperlipidemia.   Hyperlipidemia  Pertinent negatives include no chest pain or shortness of breath.     72-year-old man presents to the clinic to establish.  He had stenting of a coronary artery performed in [place] 11 years ago.  He was last seen in [place] by his cardiologist a year ago or so and had a stress test at that time which she reports as normal.  He denies rest or exertional chest pain, shortness of breath syncope and palpitations.  He does not regularly exercise and his daily step volume is relatively low at 4519-5983 steps per day he does not regularly exercise.  He does not smoke, he drinks socially and he is retired.  He does snore and he was never tested for obstructive sleep apnea.  His BMI is 32, 90 kg.  There is no recent cardiac testing available in [facility]        Past Medical History:   Diagnosis Date    Arthritis     Coronary artery disease     with stent     Deep vein thrombosis (CMS/HCC)     in left leg     GERD (gastroesophageal reflux disease)     High cholesterol     History of transfusion     Hypertension     PONV (postoperative nausea and vomiting)     Wears partial dentures     lower      Past Surgical History:   Procedure Laterality Date    HIP ARTHROPLASTY Left     JOINT REPLACEMENT      REPLACEMENT TOTAL KNEE BILATERAL Bilateral     SHOULDER SURGERY  01/01/2015    RCR    TENDON REPAIR Left 1/5/2018    Procedure: TENDON TRANSFER OF EXTENSOR INDICIS TO EXTENSOR POLLICIS LONGUS OF LEFT THUMB;  Surgeon: Nabil Wolfe DO;  Location: [facility];  Service: Orthopedics;  Laterality: Left;    TOTAL HIP  "ARTHROPLASTY Left 03/21/2017    With Dr. Strong    TOTAL HIP ARTHROPLASTY  03/21/2017    WRIST SURGERY         Allergies as of 05/10/2023    (No Known Allergies)     Current Outpatient Medications   Medication Sig Dispense Refill    ramipriL (ALTACE) 2.5 mg capsule Take 1 capsule (2.5 mg total) by mouth daily 90 capsule 4    atorvastatin (LIPITOR) 20 mg tablet Take 1 tablet (20 mg total) by mouth daily 90 tablet 4    metoprolol succinate XL (TOPROL-XL) 25 mg 24 hr tablet Take 1 tablet (25 mg total) by mouth daily 90 tablet 4    cyanocobalamin 100 mcg tablet Take 1 tablet (100 mcg total) by mouth daily      aspirin 81 mg EC tablet daily.       No current facility-administered medications for this visit.       No specialty comments available.    Family History   Problem Relation Age of Onset    Other Mother         Malignant tumor of breast    Arthritis Father     Arthritis Sister     Arthritis Brother     Arthritis Maternal Grandmother     Stroke Paternal Grandmother     Stroke Paternal Grandfather      Social History     Tobacco Use    Smoking status: Never    Smokeless tobacco: Never   Substance Use Topics    Alcohol use: No    Drug use: No       Review of Systems  Review of Systems   Cardiovascular:  Negative for chest pain, dyspnea on exertion, irregular heartbeat, leg swelling/pain and palpitations.   Respiratory:  Positive for snoring. Negative for shortness of breath, sleep disturbances due to breathing and sleep apnea.    Musculoskeletal:  Positive for joint pain.   Neurological:  Negative for dizziness and light-headedness.         Objective   Vitals:    05/10/23 1137   BP: 126/84   Pulse: 78   SpO2: 94%   Height: 1.676 m (5' 6\")   Weight: 89.6 kg (197 lb 8 oz)   PainSc:   5   PainLoc: Generalized   Patient Position: Sitting     Weight: 89.6 kg (197 lb 8 oz)  Physical Exam  Vitals and nursing note reviewed.   Constitutional:       General: He is not in acute distress.     Appearance: He is well-developed.     "  Comments: BMI 32, patient in no distress   HENT:      Head: Normocephalic.   Eyes:      General: No scleral icterus.     Pupils: Pupils are equal, round, and reactive to light.   Neck:      Thyroid: No thyromegaly.      Vascular: No JVD.      Trachea: No tracheal deviation.      Comments: No carotid bruit  Cardiovascular:      Rate and Rhythm: Normal rate and regular rhythm.      Heart sounds: Normal heart sounds. No murmur heard.     No friction rub. No gallop.   Pulmonary:      Effort: Pulmonary effort is normal. No respiratory distress.      Breath sounds: Normal breath sounds. No wheezing or rales.      Comments: Symmetric air entry no wheezing  Abdominal:      General: Bowel sounds are normal. There is distension.      Palpations: Abdomen is soft.      Tenderness: There is no abdominal tenderness. There is no rebound.   Musculoskeletal:         General: Normal range of motion.      Cervical back: Normal range of motion and neck supple.      Comments: No cyanosis, no clubbing, no edema   Skin:     General: Skin is warm and dry.      Findings: No erythema or rash.   Neurological:      Mental Status: He is alert and oriented to person, place, and time.      Deep Tendon Reflexes: Reflexes are normal and symmetric.   Psychiatric:         Behavior: Behavior normal.         Thought Content: Thought content normal.         Judgment: Judgment normal.           Data Review:   Sodium   Date Value Ref Range Status   05/01/2023 140 135 - 145 mmol/L Final     Potassium   Date Value Ref Range Status   05/01/2023 4.3 3.5 - 5.1 MMOL/L Final     Chloride   Date Value Ref Range Status   05/01/2023 106 98 - 107 mmol/L Final     CO2   Date Value Ref Range Status   05/01/2023 25 21 - 32 mmol/L Final     BUN   Date Value Ref Range Status   05/01/2023 19 7 - 25 mg/dL Final     Creatinine   Date Value Ref Range Status   05/01/2023 0.83 0.70 - 1.30 mg/dL Final     Glucose   Date Value Ref Range Status   05/01/2023 104 70 - 105 mg/dL  Final     Calcium   Date Value Ref Range Status   05/01/2023 10.3 8.6 - 10.3 mg/dL Final     Troponin I   Date Value Ref Range Status   08/06/2019 <0.030 <0.030 ng/mL Final     Cholesterol   Date Value Ref Range Status   05/01/2023 107 0 - 199 mg/dL Final     Triglycerides   Date Value Ref Range Status   05/01/2023 128 <=149 mg/dL Final     HDL   Date Value Ref Range Status   05/01/2023 38 (L) >=40 mg/dL Final     LDL Direct   Date Value Ref Range Status   07/22/2020 49 <=99 mg/dL Final     TSH: No results found for: TSH  Magnesium:   Lab Results   Component Value Date    MG 1.9 08/06/2019     Protime-INR: No results found for: PT, INR  A1c: No results found for: HGBA1C    Electrocardiogram: 5/10/2023  Sinus rhythm at 78 bpm with normal axis and intervals, nonspecific ST-T wave abnormalities isolated PVCs    Assessment and Plan:  Diagnoses and all orders for this visit:    Coronary artery disease without angina pectoris, unspecified vessel or lesion type, unspecified whether native or transplanted heart  -     US Echo complete; Future    Obstructive sleep apnea  -     Polysomnography; Future        Coronary artery disease:  Status post stenting 11 years ago in [place]  Currently asymptomatic with reasonable exercise tolerance  Non-smoker  Continue current medical regimen  Apparently he had a stress test done in [place] a year ago which we will try to obtain  Check 2D echocardiogram for function  Last LDL cholesterol is 43 in May 2023  Therapeutic lifestyle changes discussed with patient    Obesity:  BMI 32  90 kg  Continue weight management    Snoring:  Sleep study will be arranged   continue weight management          Next clinic follow up in 4 months, sooner as needed    Sincerely,    Shawn Espinosa MD

## 2023-05-11 PROCEDURE — 93010 ELECTROCARDIOGRAM REPORT: CPT | Performed by: INTERNAL MEDICINE

## 2023-06-28 ENCOUNTER — ANCILLARY PROCEDURE (OUTPATIENT)
Dept: CARDIOLOGY | Facility: CLINIC | Age: 73
End: 2023-06-28
Payer: MEDICARE

## 2023-06-28 VITALS — SYSTOLIC BLOOD PRESSURE: 138 MMHG | DIASTOLIC BLOOD PRESSURE: 86 MMHG

## 2023-06-28 DIAGNOSIS — I25.10 CORONARY ARTERY DISEASE WITHOUT ANGINA PECTORIS, UNSPECIFIED VESSEL OR LESION TYPE, UNSPECIFIED WHETHER NATIVE OR TRANSPLANTED HEART: ICD-10-CM

## 2023-06-28 LAB
ASCENDING AORTA: 3.57 CM
AV LVOT PEAK GRADIENT: 2.43 MMHG
AV MEAN GRADIENT: 3.19 MMHG
AV PEAK GRADIENT: 5.95 MMHG
DOP CALC AO PEAK VEL: 1.22 M/S
DOP CALC AO VTI: 26.5 CM
DOP CALC LVOT AREA: 4.37 CM2
DOP CALC LVOT DIAMETER: 2.36 CM
DOP CALC LVOT PEAK VEL: 78 CM/S
DOP CALC LVOT STROKE VOLUME: 77 CM3
DOP CALC MV VTI: 21.56 CM
DOP CALC RVOT PEAK VEL: 0.6 M/S
DOP CALCLVOT PEAK VEL VTI: 17.7 CM
E/A RATIO: 1.1
E/E' RATIO (AVERAGE): 8.6
E/E' RATIO: 9.2
ECHO EF ESTIMATED: 63 %
EJECTION FRACTION: 63 %
ERAP: 5 MMHG
INTERVENTRICULAR SEPTUM: 1.1 CM (ref 0.6–1.1)
IVC PROX: 1.75 CM
LA AREA A4C SYSTOLE: 105 CM3
LEFT ATRIUM SIZE: 4.3 CM
LEFT ATRIUM VOLUME INDEX: 44 ML/M2
LEFT ATRIUM VOLUME: 88.1 CM3
LEFT INTERNAL DIMENSION IN SYSTOLE: 3.9 CM (ref 2.1–4)
LEFT VENTRICLE DIASTOLIC VOLUME: 165 CM3
LEFT VENTRICLE SYSTOLIC VOLUME: 61 CM3
LEFT VENTRICULAR INTERNAL DIMENSION IN DIASTOLE: 5.4 CM (ref 3.5–6)
LVAD-AP2: 42.6 CM2
LVAD-AP4: 42.6 CM2
LVAD-AP4: 43.3 CM2
LVAD-AP4: 43.3 CM2
MV DEC SLOPE: 3280 MM/S2
MV DT: 211 MS
MV E/E' LATERAL: 8.1
MV MEAN GRADIENT: 1.15 MMHG
MV PEAK A VEL: 63 CM/S
MV PEAK E VEL: 72.4 CM/S
MV PEAK GRADIENT: 2 MMHG
MV STENOSIS PRESSURE HALF TIME: 0.07 S
MV VALVE AREA P 1/2 METHOD: 3.28 CM2
MV VMAX: 74 CM/S
MVA (VTI): 3.59 CM2
POSTERIOR WALL: 0.8 CM (ref 0.6–1.1)
PULM VEIN S/D RATIO: 1.2
PV PEAK D VEL: 25 CM/S
PV PEAK GRADIENT: 3.17 MMHG
PV PEAK S VEL: 29 CM/S
PV VMAX: 0.89 M/S
RA AREA: 20 CM2
RH ABDOMINAL AORTA: 2.6 CM
RH CV ECHO AV VALVE AREA VEL: 2.8 CM2
RH CV ECHO AV VALVE AREA VTI: 2.9 CM2
RIGHT VENTRICULAR INTERNAL DIMENSION IN DIASTOLE: 3.6 CM
RV AP4 BASE: 4.1 CM
S': 12.7 CM/S
TDI: 7.8 CM/S
TDILATERAL: 8.9 CM/S
TR MAX PG: 28.94 MMHG
TRICUSPID ANNULAR PLANE SYSTOLIC EXCURSION: 1.8 CM
TRICUSPID VALVE PEAK REGURGITATION VELOCITY: 2.69 M/S
TV REST PULMONARY ARTERY PRESSURE: 34 MMHG

## 2023-06-28 PROCEDURE — 93306 TTE W/DOPPLER COMPLETE: CPT

## 2023-06-28 PROCEDURE — 93306 TTE W/DOPPLER COMPLETE: CPT | Mod: 26 | Performed by: INTERNAL MEDICINE

## 2023-08-28 ENCOUNTER — ANCILLARY PROCEDURE (OUTPATIENT)
Dept: SLEEP MEDICINE | Facility: HOSPITAL | Age: 73
End: 2023-08-28
Payer: MEDICARE

## 2023-08-28 VITALS
DIASTOLIC BLOOD PRESSURE: 80 MMHG | OXYGEN SATURATION: 92 % | RESPIRATION RATE: 16 BRPM | HEIGHT: 66 IN | SYSTOLIC BLOOD PRESSURE: 130 MMHG | WEIGHT: 197 LBS | HEART RATE: 77 BPM | BODY MASS INDEX: 31.66 KG/M2

## 2023-08-28 DIAGNOSIS — G47.33 OBSTRUCTIVE SLEEP APNEA: ICD-10-CM

## 2023-08-28 PROCEDURE — 95811 POLYSOM 6/>YRS CPAP 4/> PARM: CPT | Mod: 26 | Performed by: PSYCHIATRY & NEUROLOGY

## 2023-08-28 PROCEDURE — 95811 POLYSOM 6/>YRS CPAP 4/> PARM: CPT

## 2023-10-02 PROBLEM — G47.33 SEVERE OBSTRUCTIVE SLEEP APNEA: Status: ACTIVE | Noted: 2023-10-02
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
## 2023-10-04 ENCOUNTER — OFFICE VISIT (OUTPATIENT)
Dept: NEUROLOGY | Facility: CLINIC | Age: 73
End: 2023-10-04
Payer: MEDICARE

## 2023-10-04 VITALS — BODY MASS INDEX: 31.67 KG/M2 | HEART RATE: 75 BPM | WEIGHT: 196.21 LBS | OXYGEN SATURATION: 95 %

## 2023-10-04 DIAGNOSIS — Z82.0 FAMILY HISTORY OF SLEEP APNEA: ICD-10-CM

## 2023-10-04 DIAGNOSIS — G47.33 SEVERE OBSTRUCTIVE SLEEP APNEA: Primary | ICD-10-CM

## 2023-10-04 DIAGNOSIS — R06.83 SNORING: ICD-10-CM

## 2023-10-04 PROCEDURE — G0463 HOSPITAL OUTPT CLINIC VISIT: HCPCS | Performed by: PHYSICIAN ASSISTANT

## 2023-10-04 PROCEDURE — 99204 OFFICE O/P NEW MOD 45 MIN: CPT | Performed by: PHYSICIAN ASSISTANT

## 2023-10-04 NOTE — PATIENT INSTRUCTIONS
You have been diagnosed with sleep apnea or another sleep disorder which may adversely affect your driving. People with sleep apnea or other sleep disorders may have a threefold increased rate of automobile crashes or other accidents. These accidents may cause serious injury or death to yourself or others. If you have had an automobile crash or frequent near-crashes due to sleepiness or inattention, you should stop driving and operating dangerous machinery until your sleep disorder has been treated and you no longer become sleepy or inattentive while driving. It is your responsibility not to drive if you are sleepy or inattentive while driving. Until your treatment has begun, have your spouse or friends drive for you. If you drive against medical advice, plan ahead so that you avoid driving at times of day when you are most likely to be sleepy, such as mid-afternoon, late at night, or after a poor night's sleep. If you have further concerns about your driving, talk to your doctor.        Living With Sleep Apnea  Sleep apnea is a condition in which breathing pauses or becomes shallow during sleep. Sleep apnea is most commonly caused by a collapsed or blocked airway. People with sleep apnea snore loudly and have times when they gasp and stop breathing for 10 seconds or more during sleep. This happens over and over during the night. This disrupts your sleep and keeps your body from getting the rest that it needs, which can cause tiredness and lack of energy (fatigue) during the day.  The breaks in breathing also interrupt the deep sleep that you need to feel rested. Even if you do not completely wake up from the gaps in breathing, your sleep may not be restful. You may also have a headache in the morning and low energy during the day, and you may feel anxious or depressed.  How can sleep apnea affect me?  Sleep apnea increases your chances of extreme tiredness during the day (daytime fatigue). It can also increase your  risk for health conditions, such as:  Heart attack.  Stroke.  Diabetes.  Heart failure.  Irregular heartbeat.  High blood pressure.  If you have daytime fatigue as a result of sleep apnea, you may be more likely to:  Perform poorly at school or work.  Fall asleep while driving.  Have difficulty with attention.  Develop depression or anxiety.  Become severely overweight (obese).  Have sexual dysfunction.  What actions can I take to manage sleep apnea?  Sleep apnea treatment  If you were given a device to open your airway while you sleep, use it only as told by your health care provider. You may be given:  An oral appliance. This is a custom-made mouthpiece that shifts your lower jaw forward.  A continuous positive airway pressure (CPAP) device. This device blows air through a mask when you breathe out (exhale).  A nasal expiratory positive airway pressure (EPAP) device. This device has valves that you put into each nostril.  A bi-level positive airway pressure (BPAP) device. This device blows air through a mask when you breathe in (inhale) and breathe out (exhale).  You may need surgery if other treatments do not work for you.       Sleep habits  Go to sleep and wake up at the same time every day. This helps set your internal clock (circadian rhythm) for sleeping.  If you stay up later than usual, such as on weekends, try to get up in the morning within 2 hours of your normal wake time.  Try to get at least 7-9 hours of sleep each night.  Stop computer, tablet, and mobile phone use a few hours before bedtime.  Do not take long naps during the day. If you nap, limit it to 30 minutes.  Have a relaxing bedtime routine. Reading or listening to music may relax you and help you sleep.  Use your bedroom only for sleep.  Keep your television and computer out of your bedroom.  Keep your bedroom cool, dark, and quiet.  Use a supportive mattress and pillows.  Follow your health care provider's instructions for other changes to  sleep habits.  Nutrition  Do not eat heavy meals in the evening.  Do not have caffeine in the later part of the day. The effects of caffeine can last for more than 5 hours.  Follow your health care provider's or dietitian's instructions for any diet changes.  Lifestyle  Do not drink alcohol before bedtime. Alcohol can cause you to fall asleep at first, but then it can cause you to wake up in the middle of the night and have trouble getting back to sleep.  Do not use any products that contain nicotine or tobacco, such as cigarettes and e-cigarettes. If you need help quitting, ask your health care provider.               Medicines  Take over-the-counter and prescription medicines only as told by your health care provider.  Do not use over-the-counter sleep medicine. You can become dependent on this medicine, and it can make sleep apnea worse.  Do not use medicines, such as sedatives and narcotics, unless told by your health care provider.  Activity  Exercise on most days, but avoid exercising in the evening. Exercising near bedtime can interfere with sleeping.  If possible, spend time outside every day. Natural light helps regulate your circadian rhythm.  General information  Lose weight if you need to, and maintain a healthy weight.  Keep all follow-up visits as told by your health care provider. This is important.  If you are having surgery, make sure to tell your health care provider that you have sleep apnea. You may need to bring your device with you.  Where to find more information  Learn more about sleep apnea and daytime fatigue from:  American Sleep Association: sleepassociation.org  National Sleep Foundation: sleepfoundation.org  National Heart, Lung, and [employer] [place]: nhlbi.nih.gov  Summary  Sleep apnea can cause daytime fatigue and other serious health conditions.  Both sleep apnea and daytime fatigue can be bad for your health and well-being.  You may need to wear a device while sleeping to help keep  your airway open.  If you are having surgery, make sure to tell your health care provider that you have sleep apnea. You may need to bring your device with you.  Making changes to sleep habits, diet, lifestyle, and activity can help you manage sleep apnea.  This information is not intended to replace advice given to you by your health care provider. Make sure you discuss any questions you have with your health care provider.       Insomnia  Insomnia is a sleep disorder that makes it difficult to fall asleep or stay asleep. Insomnia can cause fatigue, low energy, difficulty concentrating, mood swings, and poor performance at work or school.  There are three different ways to classify insomnia:  Difficulty falling asleep.  Difficulty staying asleep.  Waking up too early in the morning.  Any type of insomnia can be long-term (chronic) or short-term (acute). Both are common. Short-term insomnia usually lasts for three months or less. Chronic insomnia occurs at least three times a week for longer than three months.  What are the causes?  Insomnia may be caused by another condition, situation, or substance, such as:  Anxiety.  Certain medicines.  Gastroesophageal reflux disease (GERD) or other gastrointestinal conditions.  Asthma or other breathing conditions.  Restless legs syndrome, sleep apnea, or other sleep disorders.  Chronic pain.  Menopause.  Stroke.  Abuse of alcohol, tobacco, or illegal drugs.  Mental health conditions, such as depression.  Caffeine.  Neurological disorders, such as Alzheimer's disease.  An overactive thyroid (hyperthyroidism).  Sometimes, the cause of insomnia may not be known.  What increases the risk?  Risk factors for insomnia include:  Gender. Women are affected more often than men.  Age. Insomnia is more common as you get older.  Stress.  Lack of exercise.  Irregular work schedule or working night shifts.  Traveling between different time zones.  Certain medical and mental health  conditions.  What are the signs or symptoms?  If you have insomnia, the main symptom is having trouble falling asleep or having trouble staying asleep. This may lead to other symptoms, such as:  Feeling fatigued or having low energy.  Feeling nervous about going to sleep.  Not feeling rested in the morning.  Having trouble concentrating.  Feeling irritable, anxious, or depressed.  How is this diagnosed?  This condition may be diagnosed based on:  Your symptoms and medical history. Your health care provider may ask about:  Your sleep habits.  Any medical conditions you have.  Your mental health.  A physical exam.  How is this treated?  Treatment for insomnia depends on the cause. Treatment may focus on treating an underlying condition that is causing insomnia. Treatment may also include:  Medicines to help you sleep.  Counseling or therapy.  Lifestyle adjustments to help you sleep better.  Follow these instructions at home:       Eating and drinking  Limit or avoid alcohol, caffeinated beverages, and cigarettes, especially close to bedtime. These can disrupt your sleep.  Do not eat a large meal or eat spicy foods right before bedtime. This can lead to digestive discomfort that can make it hard for you to sleep.       Sleep habits  Keep a sleep diary to help you and your health care provider figure out what could be causing your insomnia. Write down:  When you sleep.  When you wake up during the night.  How well you sleep.  How rested you feel the next day.  Any side effects of medicines you are taking.  What you eat and drink.  Make your bedroom a dark, comfortable place where it is easy to fall asleep.  Put up shades or blackout curtains to block light from outside.  Use a white noise machine to block noise.  Keep the temperature cool.  Limit screen use before bedtime. This includes:  Watching TV.  Using your smartphone, tablet, or computer.  Stick to a routine that includes going to bed and waking up at the same  times every day and night. This can help you fall asleep faster. Consider making a quiet activity, such as reading, part of your nighttime routine.  Try to avoid taking naps during the day so that you sleep better at night.  Get out of bed if you are still awake after 15 minutes of trying to sleep. Keep the lights down, but try reading or doing a quiet activity. When you feel sleepy, go back to bed.       General instructions  Take over-the-counter and prescription medicines only as told by your health care provider.  Exercise regularly, as told by your health care provider. Avoid exercise starting several hours before bedtime.  Use relaxation techniques to manage stress. Ask your health care provider to suggest some techniques that may work well for you. These may include:  Breathing exercises.  Routines to release muscle tension.  Visualizing peaceful scenes.  Make sure that you drive carefully. Avoid driving if you feel very sleepy.  Keep all follow-up visits as told by your health care provider. This is important.  Contact a health care provider if:  You are tired throughout the day.  You have trouble in your daily routine due to sleepiness.  You continue to have sleep problems, or your sleep problems get worse.  Get help right away if:  You have serious thoughts about hurting yourself or someone else.  If you ever feel like you may hurt yourself or others, or have thoughts about taking your own life, get help right away. You can go to your nearest emergency department or call:  Your local emergency services (911 in the [place]  A suicide crisis helpline, such as the National Suicide Prevention Lifeline at 1-367.520.4932. This is open 24 hours a day.  Summary  Insomnia is a sleep disorder that makes it difficult to fall asleep or stay asleep.  Insomnia can be long-term (chronic) or short-term (acute).  Treatment for insomnia depends on the cause. Treatment may focus on treating an underlying condition that is  causing insomnia.  Keep a sleep diary to help you and your health care provider figure out what could be causing your insomnia.  This information is not intended to replace advice given to you by your health care provider. Make sure you discuss any questions you have with your health care provider.

## 2023-11-02 ENCOUNTER — TELEPHONE (OUTPATIENT)
Dept: NEUROLOGY | Facility: CLINIC | Age: 73
End: 2023-11-02
Payer: MEDICARE

## 2023-11-02 NOTE — TELEPHONE ENCOUNTER
Nurse called patient and informed him that per medicare guidelines he needed to have a face to face after receiving PAP machine. [place]. However, he needs to have a visit within the first 90 days. Nurse encouraged patient to establish with vendor in [place]. Patient verbalized understanding and was agreeable.

## 2024-04-10 ENCOUNTER — APPOINTMENT (OUTPATIENT)
Dept: RADIOLOGY | Facility: HOSPITAL | Age: 74
End: 2024-04-10
Payer: MEDICARE

## 2024-04-10 ENCOUNTER — HOSPITAL ENCOUNTER (EMERGENCY)
Facility: HOSPITAL | Age: 74
Discharge: HOME | End: 2024-04-10
Attending: STUDENT IN AN ORGANIZED HEALTH CARE EDUCATION/TRAINING PROGRAM
Payer: MEDICARE

## 2024-04-10 VITALS
RESPIRATION RATE: 20 BRPM | HEART RATE: 72 BPM | SYSTOLIC BLOOD PRESSURE: 111 MMHG | HEIGHT: 66 IN | TEMPERATURE: 97.3 F | WEIGHT: 194 LBS | BODY MASS INDEX: 31.18 KG/M2 | OXYGEN SATURATION: 93 % | DIASTOLIC BLOOD PRESSURE: 75 MMHG

## 2024-04-10 DIAGNOSIS — S62.316A DISPLACED FRACTURE OF BASE OF FIFTH METACARPAL BONE, RIGHT HAND, INITIAL ENCOUNTER FOR CLOSED FRACTURE: ICD-10-CM

## 2024-04-10 DIAGNOSIS — S69.91XA INJURY OF RIGHT HAND, INITIAL ENCOUNTER: Primary | ICD-10-CM

## 2024-04-10 PROCEDURE — 99282 EMERGENCY DEPT VISIT SF MDM: CPT | Performed by: STUDENT IN AN ORGANIZED HEALTH CARE EDUCATION/TRAINING PROGRAM

## 2024-04-10 PROCEDURE — 99283 EMERGENCY DEPT VISIT LOW MDM: CPT | Performed by: STUDENT IN AN ORGANIZED HEALTH CARE EDUCATION/TRAINING PROGRAM

## 2024-04-10 PROCEDURE — 73130 X-RAY EXAM OF HAND: CPT | Mod: RT

## 2024-04-10 NOTE — ED PROVIDER NOTES
HPI:   Chief Complaint   Patient presents with    Hand Pain     Fell today at 1430. No dizziness states he was looking up and forgot there was stairs. Noticeable mass on right hand posterior.      Patient presents with right dorsal hand injury that occurred when he was looking up and forgot about some stairs, tripping and falling landing with the right hand.  He denies any pain in the elbow or wrist, does have noticeable swelling and deformity of the hand.  He can move all the fingers, has a flexed fourth digit which she says is normal for him.  He is on a daily baby aspirin.      PE:   ED Triage Vitals [04/10/24 1515]   Temp Heart Rate Resp BP SpO2   36.3 °C (97.3 °F) 77 20 147/90 96 %      Mean BP (mmHg) Temp Source Heart Rate Source Patient Position BP Location   122 Oral -- Sitting Left arm      FiO2 (%)       --           Physical Exam  Well-appearing male in no acute distress  Right hand with localized swollen area, partially flexed fourth digit    ED procedures:   Procedures    ED Course:          Medical Decision Making      This is a 73-year-old male presenting with right hand deformity after ground-level fall.  He did not hit his head, no loss of consciousness, no dizziness.  X-ray shows grossly displaced right fifth metacarpal fracture.  Case was discussed with SHAUNNA Plummer at [facility] orthopedic and spine who recommends reduction and consultation with hand surgery.  Patient will proceed to [facility] orthopedic and spine to have this done.  Offered to reduce it in the ER, however, I would not be able to set him up for surgery which they will be able to facilitate at [facility].  He would prefer to have it done there.  He was discharged with instructions to proceed directly to [facility] for definitive treatment.    Discussion of management options was performed with patient, who understood and agreed with treatment plan.    Given Medications - No data to display    At the time of  discharge the patient was in stable condition with normal vital signs.    Discharge Instructions         You were seen in the ER for right fracture of your fifth metacarpal. You are going to proceed to  Orthopedics in [place] where they are going to reduce this and get you set up with the hand surgeon.             Clinical Impression:    Final diagnoses:   [S69.91XA] Injury of right hand, initial encounter   [S62.316A] Displaced fracture of base of fifth metacarpal bone, right hand, initial encounter for closed fracture     4/10/2024  4:01 PM     Asha Angel DO  04/10/24 [id number]

## 2024-04-10 NOTE — DISCHARGE INSTRUCTIONS
You were seen in the ER for right fracture of your fifth metacarpal. You are going to proceed to  Orthopedics in [place] where they are going to reduce this and get you set up with the hand surgeon.

## 2024-04-11 ENCOUNTER — LAB (OUTPATIENT)
Dept: LAB | Facility: CLINIC | Age: 74
End: 2024-04-11
Payer: MEDICARE

## 2024-04-11 ENCOUNTER — APPOINTMENT (OUTPATIENT)
Dept: CARDIOLOGY | Facility: CLINIC | Age: 74
End: 2024-04-11
Payer: MEDICARE

## 2024-04-11 ENCOUNTER — OFFICE VISIT (OUTPATIENT)
Dept: FAMILY MEDICINE | Facility: CLINIC | Age: 74
End: 2024-04-11
Payer: MEDICARE

## 2024-04-11 VITALS
TEMPERATURE: 98.2 F | SYSTOLIC BLOOD PRESSURE: 100 MMHG | DIASTOLIC BLOOD PRESSURE: 60 MMHG | BODY MASS INDEX: 31.31 KG/M2 | HEART RATE: 74 BPM | WEIGHT: 194 LBS | OXYGEN SATURATION: 97 %

## 2024-04-11 DIAGNOSIS — Z01.810 PREOP CARDIOVASCULAR EXAM: Primary | ICD-10-CM

## 2024-04-11 DIAGNOSIS — S62.337D CLOSED DISPLACED FRACTURE OF NECK OF FIFTH METACARPAL BONE OF LEFT HAND WITH ROUTINE HEALING, SUBSEQUENT ENCOUNTER: ICD-10-CM

## 2024-04-11 LAB
ALBUMIN SERPL-MCNC: 4.7 G/DL (ref 3.5–5.3)
ALP SERPL-CCNC: 68 U/L (ref 45–115)
ALT SERPL-CCNC: 41 U/L (ref 7–52)
ANION GAP SERPL CALC-SCNC: 7 MMOL/L (ref 3–11)
AST SERPL-CCNC: 41 U/L
BILIRUB SERPL-MCNC: 1.58 MG/DL (ref 0.2–1.4)
BUN SERPL-MCNC: 18 MG/DL (ref 7–25)
CALCIUM ALBUM COR SERPL-MCNC: 9.5 MG/DL (ref 8.6–10.3)
CALCIUM SERPL-MCNC: 10.1 MG/DL (ref 8.6–10.3)
CHLORIDE SERPL-SCNC: 105 MMOL/L (ref 98–107)
CO2 SERPL-SCNC: 25 MMOL/L (ref 21–32)
CREAT SERPL-MCNC: 0.72 MG/DL (ref 0.7–1.3)
EGFRCR SERPLBLD CKD-EPI 2021: 97 ML/MIN/1.73M*2
ERYTHROCYTE [DISTWIDTH] IN BLOOD BY AUTOMATED COUNT: 14.4 % (ref 11.5–15)
GLUCOSE SERPL-MCNC: 105 MG/DL (ref 70–105)
HCT VFR BLD AUTO: 46.2 % (ref 38–50)
HGB BLD-MCNC: 15.4 G/DL (ref 13.2–17.2)
MCH RBC QN AUTO: 29.6 PG (ref 29–34)
MCHC RBC AUTO-ENTMCNC: 33.3 G/DL (ref 32–36)
MCV RBC AUTO: 88.9 FL (ref 82–97)
PLATELET # BLD AUTO: 140 10*3/UL (ref 130–350)
PMV BLD AUTO: 10 FL (ref 6.9–10.8)
POTASSIUM SERPL-SCNC: 4.3 MMOL/L (ref 3.5–5.1)
PROT SERPL-MCNC: 7.6 G/DL (ref 6–8.3)
RBC # BLD AUTO: 5.2 10*6/ΜL (ref 4.1–5.8)
SODIUM SERPL-SCNC: 137 MMOL/L (ref 135–145)
WBC # BLD AUTO: 6.7 10*3/UL (ref 3.7–9.6)

## 2024-04-11 PROCEDURE — 99213 OFFICE O/P EST LOW 20 MIN: CPT | Performed by: STUDENT IN AN ORGANIZED HEALTH CARE EDUCATION/TRAINING PROGRAM

## 2024-04-11 PROCEDURE — G0463 HOSPITAL OUTPT CLINIC VISIT: HCPCS | Performed by: STUDENT IN AN ORGANIZED HEALTH CARE EDUCATION/TRAINING PROGRAM

## 2024-04-11 PROCEDURE — 93005 ELECTROCARDIOGRAM TRACING: CPT | Performed by: STUDENT IN AN ORGANIZED HEALTH CARE EDUCATION/TRAINING PROGRAM

## 2024-04-11 PROCEDURE — 85027 COMPLETE CBC AUTOMATED: CPT | Performed by: STUDENT IN AN ORGANIZED HEALTH CARE EDUCATION/TRAINING PROGRAM

## 2024-04-11 PROCEDURE — 36415 COLL VENOUS BLD VENIPUNCTURE: CPT | Performed by: STUDENT IN AN ORGANIZED HEALTH CARE EDUCATION/TRAINING PROGRAM

## 2024-04-11 PROCEDURE — 80053 COMPREHEN METABOLIC PANEL: CPT | Performed by: STUDENT IN AN ORGANIZED HEALTH CARE EDUCATION/TRAINING PROGRAM

## 2024-04-11 NOTE — PROGRESS NOTES
PRE-OPERATIVE CLEARANCE NOTE    The patient is medically optimized for 5th metacarpal repair.    PCP:  Wisam Seymour MD  DATE OF SERVICE: 4/11/2024    CC:   PRE-OPERATIVE CLEARANCE.    HPI:    Juan Corona is a 73 y.o. old male who presents for pre-operative evaluation as requested by [facility]. He requires evaluation and anesthesia clearance prior to undergoing 5th metacarpal repair.   Date of Surgery/Procedure: 4/12/24  Previously tolerated anesthesia: Yes, but with significant  The patient denies chest pain, palpitations, SOB, recent illness.    Today's preoperative cardiac clearance is based on the Revised Mcfarland Cardiac Risk Index (RCRI). The patient's score is = 0  This score has been based on the following risk factors for this patient:   - High-risk type of surgery = 0   - Ischemic heart disease = 0   - CHF = 0   - CVA = 0   - Diabetes mellitus requiring insulin = 0   - Creatinine >2.0 mg/dL = 0    ARISCAT Score: 11, low risk    10 point ROS completed and negative other than noted in the HPI above.     PHYSICAL EXAMINATION:   /60   Pulse 74   Temp 36.8 °C (98.2 °F)   Wt 88 kg (194 lb)   SpO2 97%   BMI 31.31 kg/m²   GENERAL APPEARANCE: No apparent distress.  Alert and oriented ×3.  HEENT: Atraumatic, normocephalic.  EOMI.  MMM.  No conjunctival erythema or exudates.    NECK: Supple.  PULM: Normal respiratory effort.  Clear to auscultation bilaterally.  No wheezes, rales, or rhonchi.  CV: RRR with normal S1 and S2.  No appreciable murmurs, gallops, or rubs.    DERM: Warm and dry.   MSK: No obvious edema, erythema or deformity.  ROM intact.  PSYCH: Cooperative, normal affect.    NEURO: No focal deficits.    LABORATORY/IMAGING:     EKG: normal sinus rhythm, no blocks or conduction defects, no ischemic changes    Last CBC:  Lab Results   Component Value Date    WBC 6.7 04/11/2024    HGB 15.4 04/11/2024    HCT 46.2 04/11/2024    MCV 88.9 04/11/2024     04/11/2024       Last CMP:     Chemistry        Component Value Date/Time     04/11/2024 1058    K 4.3 04/11/2024 1058     04/11/2024 1058    CO2 25 04/11/2024 1058    BUN 18 04/11/2024 1058    CREATININE 0.72 04/11/2024 1058        Component Value Date/Time    CALCIUM 10.1 04/11/2024 1058    ALKPHOS 68 04/11/2024 1058    AST 41 (H) 04/11/2024 1058    ALT 41 04/11/2024 1058    BILITOT 1.58 (H) 04/11/2024 1058              ASSESSMENT/PLAN:   Diagnoses and all orders for this visit:    Preop cardiovascular exam  -     ECG 12 lead  -     CBC Blood, Venous  -     Comprehensive metabolic panel Blood, Venous    Closed displaced fracture of neck of fifth metacarpal bone of left hand with routine healing, subsequent encounter        EKG and labs reviewed with the patient. We discussed proper medication use in the perioperative period. His questions were fully answered.    OVERALL EVALUATION: Patient is medically optimized for the above operation with the risk stratification profile as above. Patient expressed understanding and recognition of these risks.   ___________________  Holli Carbone MD   04/11/24 2:28 PM

## 2024-04-12 PROCEDURE — 93010 ELECTROCARDIOGRAM REPORT: CPT | Performed by: INTERNAL MEDICINE

## 2024-04-25 PROCEDURE — 99284 EMERGENCY DEPT VISIT MOD MDM: CPT | Performed by: EMERGENCY MEDICINE

## 2024-04-26 ENCOUNTER — ANESTHESIA (OUTPATIENT)
Dept: GASTROENTEROLOGY | Facility: HOSPITAL | Age: 74
End: 2024-04-26
Payer: MEDICARE

## 2024-04-26 ENCOUNTER — APPOINTMENT (OUTPATIENT)
Dept: FLUOROSCOPY | Facility: HOSPITAL | Age: 74
End: 2024-04-26
Payer: MEDICARE

## 2024-04-26 ENCOUNTER — HOSPITAL ENCOUNTER (EMERGENCY)
Facility: HOSPITAL | Age: 74
Discharge: HOME | End: 2024-04-26
Attending: EMERGENCY MEDICINE
Payer: MEDICARE

## 2024-04-26 ENCOUNTER — APPOINTMENT (OUTPATIENT)
Dept: CT IMAGING | Facility: HOSPITAL | Age: 74
End: 2024-04-26
Payer: MEDICARE

## 2024-04-26 ENCOUNTER — APPOINTMENT (OUTPATIENT)
Dept: ULTRASOUND IMAGING | Facility: HOSPITAL | Age: 74
End: 2024-04-26
Payer: MEDICARE

## 2024-04-26 ENCOUNTER — APPOINTMENT (OUTPATIENT)
Dept: RADIOLOGY | Facility: HOSPITAL | Age: 74
End: 2024-04-26
Payer: MEDICARE

## 2024-04-26 ENCOUNTER — ANESTHESIA EVENT (OUTPATIENT)
Dept: GASTROENTEROLOGY | Facility: HOSPITAL | Age: 74
End: 2024-04-26
Payer: MEDICARE

## 2024-04-26 VITALS
HEIGHT: 66 IN | OXYGEN SATURATION: 93 % | RESPIRATION RATE: 10 BRPM | DIASTOLIC BLOOD PRESSURE: 81 MMHG | BODY MASS INDEX: 31.18 KG/M2 | SYSTOLIC BLOOD PRESSURE: 141 MMHG | HEART RATE: 70 BPM | WEIGHT: 194 LBS | TEMPERATURE: 97 F

## 2024-04-26 DIAGNOSIS — Z90.49 S/P LAPAROSCOPIC CHOLECYSTECTOMY: ICD-10-CM

## 2024-04-26 DIAGNOSIS — K80.12 CALCULUS OF GALLBLADDER WITH ACUTE ON CHRONIC CHOLECYSTITIS WITHOUT OBSTRUCTION: ICD-10-CM

## 2024-04-26 DIAGNOSIS — R07.9 CHEST PAIN, UNSPECIFIED TYPE: Primary | ICD-10-CM

## 2024-04-26 LAB
ALBUMIN SERPL-MCNC: 4.4 G/DL (ref 3.5–5.3)
ALP SERPL-CCNC: 66 U/L (ref 45–115)
ALT SERPL-CCNC: 37 U/L (ref 7–52)
ANION GAP SERPL CALC-SCNC: 10 MMOL/L (ref 3–11)
AST SERPL-CCNC: 36 U/L
BASOPHILS # BLD AUTO: 0.1 10*3/UL
BASOPHILS NFR BLD AUTO: 0.6 % (ref 0–2)
BILIRUB SERPL-MCNC: 1.05 MG/DL (ref 0.2–1.4)
BNP SERPL-MCNC: 109 PG/ML (ref 0–100)
BUN SERPL-MCNC: 22 MG/DL (ref 7–25)
CALCIUM ALBUM COR SERPL-MCNC: 9.7 MG/DL (ref 8.6–10.3)
CALCIUM SERPL-MCNC: 10 MG/DL (ref 8.6–10.3)
CHLORIDE SERPL-SCNC: 105 MMOL/L (ref 98–107)
CO2 SERPL-SCNC: 25 MMOL/L (ref 21–32)
CREAT SERPL-MCNC: 0.86 MG/DL (ref 0.7–1.3)
DELTA HIGH SENSITIVITY TROPONIN I, 1 HOUR: 0.3
EGFRCR SERPLBLD CKD-EPI 2021: 92 ML/MIN/1.73M*2
EOSINOPHIL # BLD AUTO: 0.2 10*3/UL
EOSINOPHIL NFR BLD AUTO: 2.3 % (ref 0–3)
ERYTHROCYTE [DISTWIDTH] IN BLOOD BY AUTOMATED COUNT: 14 % (ref 11.5–15)
FIBRIN D-DIMER (NG/ML) IN PLATELET POOR PLASMA: 842 NG/MLFEU
GLUCOSE SERPL-MCNC: 95 MG/DL (ref 70–105)
HCT VFR BLD AUTO: 43.6 % (ref 38–50)
HGB BLD-MCNC: 14.4 G/DL (ref 13.2–17.2)
LIPASE SERPL-CCNC: 24 U/L (ref 11–82)
LYMPHOCYTES # BLD AUTO: 2.3 10*3/UL
LYMPHOCYTES NFR BLD AUTO: 26.1 % (ref 15–47)
MCH RBC QN AUTO: 29.4 PG (ref 29–34)
MCHC RBC AUTO-ENTMCNC: 33.1 G/DL (ref 32–36)
MCV RBC AUTO: 88.8 FL (ref 82–97)
MONOCYTES # BLD AUTO: 1 10*3/UL
MONOCYTES NFR BLD AUTO: 11.8 % (ref 5–13)
NEUTROPHILS # BLD AUTO: 5.1 10*3/UL
NEUTROPHILS NFR BLD AUTO: 59.2 % (ref 46–70)
PLATELET # BLD AUTO: 127 10*3/UL (ref 130–350)
PMV BLD AUTO: 10.1 FL (ref 6.9–10.8)
POTASSIUM SERPL-SCNC: 4 MMOL/L (ref 3.5–5.1)
PROT SERPL-MCNC: 6.9 G/DL (ref 6–8.3)
RBC # BLD AUTO: 4.91 10*6/ΜL (ref 4.1–5.8)
SODIUM SERPL-SCNC: 140 MMOL/L (ref 135–145)
TROPONIN I SERPL-MCNC: 8.8 PG/ML
TROPONIN I SERPL-MCNC: 9.1 PG/ML
WBC # BLD AUTO: 8.7 10*3/UL (ref 3.7–9.6)

## 2024-04-26 PROCEDURE — 99100 ANES PT EXTEME AGE<1 YR&>70: CPT | Performed by: NURSE ANESTHETIST, CERTIFIED REGISTERED

## 2024-04-26 PROCEDURE — (BLANK) HC RECOVERY PHASE-1 1ST  HOUR ACUITY LEVEL 2: Performed by: SURGERY

## 2024-04-26 PROCEDURE — 74174 CTA ABD&PLVS W/CONTRAST: CPT | Mod: 26 | Performed by: RADIOLOGY

## 2024-04-26 PROCEDURE — 2580000300 HC RX 258: Performed by: SURGERY

## 2024-04-26 PROCEDURE — (BLANK) HC GENERAL ANESTHESIA FACILITY CHARGE 1ST 15 MIN: Performed by: SURGERY

## 2024-04-26 PROCEDURE — 2720000000 HC SUPP 272 WO HCPCS: Performed by: SURGERY

## 2024-04-26 PROCEDURE — 00790 ANES IPER UPR ABD NOS: CPT | Performed by: NURSE ANESTHETIST, CERTIFIED REGISTERED

## 2024-04-26 PROCEDURE — 84484 ASSAY OF TROPONIN QUANT: CPT | Performed by: EMERGENCY MEDICINE

## 2024-04-26 PROCEDURE — 83690 ASSAY OF LIPASE: CPT | Performed by: EMERGENCY MEDICINE

## 2024-04-26 PROCEDURE — 2550000100 HC RX 255: Mod: JZ | Performed by: SURGERY

## 2024-04-26 PROCEDURE — 2500000200 HC RX 250 WO HCPCS: Performed by: SURGERY

## 2024-04-26 PROCEDURE — (BLANK) HC OR LEVEL 4 PROC EACH ADDITIONAL MIN: Performed by: SURGERY

## 2024-04-26 PROCEDURE — C1729 CATH, DRAINAGE: HCPCS | Performed by: SURGERY

## 2024-04-26 PROCEDURE — 6360000200 HC RX 636 W HCPCS (ALT 250 FOR IP): Performed by: NURSE ANESTHETIST, CERTIFIED REGISTERED

## 2024-04-26 PROCEDURE — 85379 FIBRIN DEGRADATION QUANT: CPT | Performed by: EMERGENCY MEDICINE

## 2024-04-26 PROCEDURE — 2500000200 HC RX 250 WO HCPCS: Performed by: NURSE ANESTHETIST, CERTIFIED REGISTERED

## 2024-04-26 PROCEDURE — 99204 OFFICE O/P NEW MOD 45 MIN: CPT | Mod: 57 | Performed by: NURSE PRACTITIONER

## 2024-04-26 PROCEDURE — 83880 ASSAY OF NATRIURETIC PEPTIDE: CPT | Performed by: EMERGENCY MEDICINE

## 2024-04-26 PROCEDURE — 2550000100 HC RX 255: Mod: JZ | Performed by: EMERGENCY MEDICINE

## 2024-04-26 PROCEDURE — 71045 X-RAY EXAM CHEST 1 VIEW: CPT | Mod: 26,CMS | Performed by: RADIOLOGY

## 2024-04-26 PROCEDURE — 93005 ELECTROCARDIOGRAM TRACING: CPT | Performed by: EMERGENCY MEDICINE

## 2024-04-26 PROCEDURE — 74300 X-RAY BILE DUCTS/PANCREAS: CPT

## 2024-04-26 PROCEDURE — 93010 ELECTROCARDIOGRAM REPORT: CPT | Performed by: EMERGENCY MEDICINE

## 2024-04-26 PROCEDURE — 80053 COMPREHEN METABOLIC PANEL: CPT | Performed by: EMERGENCY MEDICINE

## 2024-04-26 PROCEDURE — 76705 ECHO EXAM OF ABDOMEN: CPT

## 2024-04-26 PROCEDURE — 36415 COLL VENOUS BLD VENIPUNCTURE: CPT | Performed by: EMERGENCY MEDICINE

## 2024-04-26 PROCEDURE — 74300 X-RAY BILE DUCTS/PANCREAS: CPT | Mod: 26,NCP | Performed by: RADIOLOGY

## 2024-04-26 PROCEDURE — (BLANK) HC GENERAL ANESTHESIA FACILITY CHARGE EACH ADDITIONAL MIN: Performed by: SURGERY

## 2024-04-26 PROCEDURE — 71275 CT ANGIOGRAPHY CHEST: CPT | Mod: 26 | Performed by: RADIOLOGY

## 2024-04-26 PROCEDURE — 85025 COMPLETE CBC W/AUTO DIFF WBC: CPT | Performed by: EMERGENCY MEDICINE

## 2024-04-26 PROCEDURE — (BLANK) HC RECOVERY PHASE-2 EACH ADDITIONAL 1/2 HOUR ACUITY LEVEL 1: Performed by: SURGERY

## 2024-04-26 PROCEDURE — C1751 CATH, INF, PER/CENT/MIDLINE: HCPCS | Performed by: SURGERY

## 2024-04-26 PROCEDURE — 71045 X-RAY EXAM CHEST 1 VIEW: CPT

## 2024-04-26 PROCEDURE — 76705 ECHO EXAM OF ABDOMEN: CPT | Mod: 26 | Performed by: RADIOLOGY

## 2024-04-26 PROCEDURE — (BLANK) HC OR LEVEL 4 PROC 1ST 15MIN: Performed by: SURGERY

## 2024-04-26 PROCEDURE — 88304 TISSUE EXAM BY PATHOLOGIST: CPT

## 2024-04-26 PROCEDURE — 71275 CT ANGIOGRAPHY CHEST: CPT

## 2024-04-26 PROCEDURE — 6360000200 HC RX 636 W HCPCS (ALT 250 FOR IP): Performed by: SURGERY

## 2024-04-26 PROCEDURE — (BLANK) HC RECOVERY PHASE-2 1ST 1/2 HOUR ACUITY LEVEL 1: Performed by: SURGERY

## 2024-04-26 PROCEDURE — 47563 LAPARO CHOLECYSTECTOMY/GRAPH: CPT | Performed by: SURGERY

## 2024-04-26 RX ORDER — SODIUM CHLORIDE, SODIUM LACTATE, POTASSIUM CHLORIDE, CALCIUM CHLORIDE 600; 310; 30; 20 MG/100ML; MG/100ML; MG/100ML; MG/100ML
50 INJECTION, SOLUTION INTRAVENOUS CONTINUOUS
Status: DISCONTINUED | OUTPATIENT
Start: 2024-04-26 | End: 2024-04-26 | Stop reason: HOSPADM

## 2024-04-26 RX ORDER — OXYCODONE HYDROCHLORIDE 5 MG/1
5 TABLET ORAL ONCE AS NEEDED
Status: CANCELLED | OUTPATIENT
Start: 2024-04-26

## 2024-04-26 RX ORDER — METHOCARBAMOL 500 MG/1
500 TABLET, FILM COATED ORAL 4 TIMES DAILY
Qty: 40 TABLET | Refills: 0 | Status: SHIPPED | OUTPATIENT
Start: 2024-04-26 | End: 2024-05-06

## 2024-04-26 RX ORDER — MAGNESIUM SULFATE HEPTAHYDRATE 40 MG/ML
INJECTION, SOLUTION INTRAVENOUS AS NEEDED
Status: DISCONTINUED | OUTPATIENT
Start: 2024-04-26 | End: 2024-04-26 | Stop reason: SURG

## 2024-04-26 RX ORDER — SODIUM CHLORIDE 0.9 % (FLUSH) 0.9 %
2 SYRINGE (ML) INJECTION EVERY 8 HOURS SCHEDULED
Status: CANCELLED | OUTPATIENT
Start: 2024-04-26

## 2024-04-26 RX ORDER — GLYCOPYRROLATE 0.2 MG/ML
INJECTION INTRAMUSCULAR; INTRAVENOUS AS NEEDED
Status: DISCONTINUED | OUTPATIENT
Start: 2024-04-26 | End: 2024-04-26 | Stop reason: SURG

## 2024-04-26 RX ORDER — ACETAMINOPHEN 500 MG
1000 TABLET ORAL 3 TIMES DAILY
Qty: 42 TABLET | Refills: 0 | Status: SHIPPED | OUTPATIENT
Start: 2024-04-26 | End: 2024-05-09 | Stop reason: ALTCHOICE

## 2024-04-26 RX ORDER — ONDANSETRON HYDROCHLORIDE 2 MG/ML
4 INJECTION, SOLUTION INTRAVENOUS ONCE AS NEEDED
Status: DISCONTINUED | OUTPATIENT
Start: 2024-04-26 | End: 2024-04-26 | Stop reason: HOSPADM

## 2024-04-26 RX ORDER — ONDANSETRON HYDROCHLORIDE 2 MG/ML
INJECTION, SOLUTION INTRAVENOUS AS NEEDED
Status: DISCONTINUED | OUTPATIENT
Start: 2024-04-26 | End: 2024-04-26 | Stop reason: SURG

## 2024-04-26 RX ORDER — SODIUM CHLORIDE 0.9 % (FLUSH) 0.9 %
2 SYRINGE (ML) INJECTION AS NEEDED
Status: CANCELLED | OUTPATIENT
Start: 2024-04-26

## 2024-04-26 RX ORDER — PROPOFOL 10 MG/ML
INJECTION, EMULSION INTRAVENOUS CONTINUOUS PRN
Status: DISCONTINUED | OUTPATIENT
Start: 2024-04-26 | End: 2024-04-26 | Stop reason: SURG

## 2024-04-26 RX ORDER — BUPIVACAINE HYDROCHLORIDE 2.5 MG/ML
INJECTION, SOLUTION EPIDURAL; INFILTRATION; INTRACAUDAL AS NEEDED
Status: DISCONTINUED | OUTPATIENT
Start: 2024-04-26 | End: 2024-04-26 | Stop reason: HOSPADM

## 2024-04-26 RX ORDER — IOPAMIDOL 612 MG/ML
INJECTION, SOLUTION INTRAVASCULAR AS NEEDED
Status: DISCONTINUED | OUTPATIENT
Start: 2024-04-26 | End: 2024-04-26 | Stop reason: HOSPADM

## 2024-04-26 RX ORDER — IOPAMIDOL 755 MG/ML
80 INJECTION, SOLUTION INTRAVASCULAR ONCE
Status: COMPLETED | OUTPATIENT
Start: 2024-04-26 | End: 2024-04-26

## 2024-04-26 RX ORDER — METHOCARBAMOL 750 MG/1
750 TABLET, FILM COATED ORAL ONCE
Status: CANCELLED | OUTPATIENT
Start: 2024-04-26 | End: 2024-04-26

## 2024-04-26 RX ORDER — SODIUM CHLORIDE 0.9 % (FLUSH) 0.9 %
2 SYRINGE (ML) INJECTION AS NEEDED
Status: DISCONTINUED | OUTPATIENT
Start: 2024-04-26 | End: 2024-04-26 | Stop reason: HOSPADM

## 2024-04-26 RX ORDER — FENTANYL CITRATE/PF 50 MCG/ML
50 PLASTIC BAG, INJECTION (ML) INTRAVENOUS EVERY 10 MIN PRN
Status: DISCONTINUED | OUTPATIENT
Start: 2024-04-26 | End: 2024-04-26 | Stop reason: HOSPADM

## 2024-04-26 RX ORDER — ROCURONIUM BROMIDE 10 MG/ML
INJECTION, SOLUTION INTRAVENOUS AS NEEDED
Status: DISCONTINUED | OUTPATIENT
Start: 2024-04-26 | End: 2024-04-26 | Stop reason: SURG

## 2024-04-26 RX ORDER — SODIUM CHLORIDE 9 MG/ML
INJECTION, SOLUTION INTRAMUSCULAR; INTRAVENOUS; SUBCUTANEOUS AS NEEDED
Status: DISCONTINUED | OUTPATIENT
Start: 2024-04-26 | End: 2024-04-26 | Stop reason: HOSPADM

## 2024-04-26 RX ORDER — OXYCODONE HYDROCHLORIDE 5 MG/1
5 TABLET ORAL EVERY 4 HOURS PRN
Qty: 5 TABLET | Refills: 0 | Status: SHIPPED | OUTPATIENT
Start: 2024-04-26 | End: 2024-04-29

## 2024-04-26 RX ORDER — CELECOXIB 200 MG/1
200 CAPSULE ORAL ONCE
Status: CANCELLED | OUTPATIENT
Start: 2024-04-26 | End: 2024-04-26

## 2024-04-26 RX ORDER — SODIUM CHLORIDE, SODIUM LACTATE, POTASSIUM CHLORIDE, AND CALCIUM CHLORIDE .6; .31; .03; .02 G/100ML; G/100ML; G/100ML; G/100ML
500 INJECTION, SOLUTION INTRAVENOUS ONCE AS NEEDED
Status: DISCONTINUED | OUTPATIENT
Start: 2024-04-26 | End: 2024-04-26 | Stop reason: HOSPADM

## 2024-04-26 RX ORDER — MELOXICAM 7.5 MG/1
7.5 TABLET ORAL DAILY
Qty: 10 TABLET | Refills: 0 | Status: SHIPPED | OUTPATIENT
Start: 2024-04-26 | End: 2024-05-09 | Stop reason: ALTCHOICE

## 2024-04-26 RX ORDER — NALOXONE HYDROCHLORIDE 0.4 MG/ML
0.2 INJECTION, SOLUTION INTRAMUSCULAR; INTRAVENOUS; SUBCUTANEOUS AS NEEDED
Status: DISCONTINUED | OUTPATIENT
Start: 2024-04-26 | End: 2024-04-26 | Stop reason: HOSPADM

## 2024-04-26 RX ORDER — SODIUM CHLORIDE 0.9 % (FLUSH) 0.9 %
2 SYRINGE (ML) INJECTION EVERY 8 HOURS SCHEDULED
Status: DISCONTINUED | OUTPATIENT
Start: 2024-04-26 | End: 2024-04-26 | Stop reason: HOSPADM

## 2024-04-26 RX ORDER — PROPOFOL 10 MG/ML
INJECTION, EMULSION INTRAVENOUS AS NEEDED
Status: DISCONTINUED | OUTPATIENT
Start: 2024-04-26 | End: 2024-04-26 | Stop reason: SURG

## 2024-04-26 RX ORDER — DEXAMETHASONE SODIUM PHOSPHATE 4 MG/ML
INJECTION, SOLUTION INTRA-ARTICULAR; INTRALESIONAL; INTRAMUSCULAR; INTRAVENOUS; SOFT TISSUE AS NEEDED
Status: DISCONTINUED | OUTPATIENT
Start: 2024-04-26 | End: 2024-04-26 | Stop reason: SURG

## 2024-04-26 RX ORDER — LIDOCAINE HYDROCHLORIDE 20 MG/ML
INJECTION, SOLUTION EPIDURAL; INFILTRATION; INTRACAUDAL; PERINEURAL AS NEEDED
Status: DISCONTINUED | OUTPATIENT
Start: 2024-04-26 | End: 2024-04-26 | Stop reason: SURG

## 2024-04-26 RX ORDER — CELECOXIB 200 MG/1
200 CAPSULE ORAL ONCE
Status: DISCONTINUED | OUTPATIENT
Start: 2024-04-26 | End: 2024-04-26 | Stop reason: HOSPADM

## 2024-04-26 RX ORDER — ACETAMINOPHEN 500 MG
1000 TABLET ORAL ONCE
Status: CANCELLED | OUTPATIENT
Start: 2024-04-26 | End: 2024-04-26

## 2024-04-26 RX ORDER — ACETAMINOPHEN 500 MG
1000 TABLET ORAL ONCE
Status: DISCONTINUED | OUTPATIENT
Start: 2024-04-26 | End: 2024-04-26 | Stop reason: HOSPADM

## 2024-04-26 RX ORDER — SODIUM CHLORIDE, SODIUM LACTATE, POTASSIUM CHLORIDE, CALCIUM CHLORIDE 600; 310; 30; 20 MG/100ML; MG/100ML; MG/100ML; MG/100ML
50 INJECTION, SOLUTION INTRAVENOUS CONTINUOUS
Status: CANCELLED | OUTPATIENT
Start: 2024-04-26

## 2024-04-26 RX ORDER — FENTANYL CITRATE/PF 50 MCG/ML
PLASTIC BAG, INJECTION (ML) INTRAVENOUS AS NEEDED
Status: DISCONTINUED | OUTPATIENT
Start: 2024-04-26 | End: 2024-04-26 | Stop reason: SURG

## 2024-04-26 RX ORDER — SODIUM CHLORIDE, SODIUM LACTATE, POTASSIUM CHLORIDE, AND CALCIUM CHLORIDE .6; .31; .03; .02 G/100ML; G/100ML; G/100ML; G/100ML
250 INJECTION, SOLUTION INTRAVENOUS ONCE AS NEEDED
Status: DISCONTINUED | OUTPATIENT
Start: 2024-04-26 | End: 2024-04-26 | Stop reason: HOSPADM

## 2024-04-26 RX ORDER — GABAPENTIN 300 MG/1
300 CAPSULE ORAL ONCE
Status: DISCONTINUED | OUTPATIENT
Start: 2024-04-26 | End: 2024-04-26 | Stop reason: HOSPADM

## 2024-04-26 RX ADMIN — DEXAMETHASONE SODIUM PHOSPHATE 10 MG: 4 INJECTION, SOLUTION INTRAMUSCULAR; INTRAVENOUS at 13:49

## 2024-04-26 RX ADMIN — LIDOCAINE HYDROCHLORIDE 100 MG: 20 INJECTION, SOLUTION EPIDURAL; INFILTRATION; INTRACAUDAL; PERINEURAL at 13:49

## 2024-04-26 RX ADMIN — GLYCOPYRROLATE 0.2 MG: 0.2 INJECTION, SOLUTION INTRAMUSCULAR; INTRAVENOUS at 13:49

## 2024-04-26 RX ADMIN — MAGNESIUM SULFATE HEPTAHYDRATE 2 G: 40 INJECTION, SOLUTION INTRAVENOUS at 14:00

## 2024-04-26 RX ADMIN — IOPAMIDOL 80 ML: 755 INJECTION, SOLUTION INTRAVENOUS at 01:35

## 2024-04-26 RX ADMIN — ROCURONIUM BROMIDE 20 MG: 10 INJECTION INTRAVENOUS at 13:49

## 2024-04-26 RX ADMIN — CEFOXITIN 2000 MG: 2 INJECTION, POWDER, FOR SOLUTION INTRAVENOUS at 13:52

## 2024-04-26 RX ADMIN — SODIUM CHLORIDE, POTASSIUM CHLORIDE, SODIUM LACTATE AND CALCIUM CHLORIDE 50 ML/HR: 600; 310; 30; 20 INJECTION, SOLUTION INTRAVENOUS at 06:55

## 2024-04-26 RX ADMIN — PROPOFOL 150 MCG/KG/MIN: 10 INJECTION, EMULSION INTRAVENOUS at 13:49

## 2024-04-26 RX ADMIN — FENTANYL CITRATE 50 MCG: 50 INJECTION, SOLUTION INTRAMUSCULAR; INTRAVENOUS at 13:49

## 2024-04-26 RX ADMIN — ROCURONIUM BROMIDE 30 MG: 10 INJECTION INTRAVENOUS at 14:00

## 2024-04-26 RX ADMIN — ONDANSETRON 4 MG: 2 INJECTION INTRAMUSCULAR; INTRAVENOUS at 13:44

## 2024-04-26 RX ADMIN — FENTANYL CITRATE 50 MCG: 50 INJECTION, SOLUTION INTRAMUSCULAR; INTRAVENOUS at 14:23

## 2024-04-26 RX ADMIN — PROPOFOL 150 MG: 10 INJECTION, EMULSION INTRAVENOUS at 13:49

## 2024-04-26 RX ADMIN — SUGAMMADEX 200 MG: 100 INJECTION, SOLUTION INTRAVENOUS at 15:19

## 2024-04-26 RX ADMIN — FENTANYL CITRATE 50 MCG: 50 INJECTION, SOLUTION INTRAMUSCULAR; INTRAVENOUS at 14:10

## 2024-04-26 RX ADMIN — FENTANYL CITRATE 100 MCG: 50 INJECTION, SOLUTION INTRAMUSCULAR; INTRAVENOUS at 15:01

## 2024-04-26 RX ADMIN — FENTANYL CITRATE 100 MCG: 50 INJECTION, SOLUTION INTRAMUSCULAR; INTRAVENOUS at 14:32

## 2024-04-26 ASSESSMENT — ENCOUNTER SYMPTOMS
SHORTNESS OF BREATH: 0
EXERCISE TOLERANCE: GOOD (4-7 METS)

## 2024-04-26 NOTE — ANESTHESIA PROCEDURE NOTES
Airway  Date/Time: 4/26/2024 1:50 PM  Urgency: elective    Airway not difficult    General Information and Staff    Patient location during procedure: OR  CRNA: Shanthi Brush CRNA  Performed: CRNA     Indications and Patient Condition  Indications for airway management: anesthesia and airway protection  Spontaneous Ventilation: absent  Sedation level: deep  Preoxygenated: yes  Patient position: reverse Trendelenburg  MILS maintained throughout  Mask difficulty assessment: 1 - vent by mask    Final Airway Details  Final airway type: endotracheal airway      Successful airway: ETT  Cuffed: yes   Successful intubation technique: video laryngoscopy  Facilitating devices/methods: cricoid pressure  Blade type: Abdalla.  Blade size: #3  ETT size (mm): 8.0  Cormack-Lehane Classification: grade I - full view of glottis  Placement verified by: chest auscultation, capnometry and palpation of cuff   Measured from: lips  ETT to lips (cm): 25  Number of attempts at approach: 1

## 2024-04-26 NOTE — H&P (VIEW-ONLY)
General Surgery H&P     Date: 4/26/2024   Patient: Juan Corona   MRN: [id number]        HISTORY OF PRESENT ILLNESS:  Juan Corona is a 73 y.o. male referred for consideration of cholecystectomy due to acute cholecystitis.  Over the last 2 to 3 weeks he has had pain in his high right upper quadrant/right chest.  Sometimes worsened by food but not always associated with meals.     He presented to the ED last night for evaluation of this right chest pain.  After an overall reassuring cardiopulmonary workup (see ED provider note for complete details), a right upper quadrant ultrasound showed cholelithiasis with distended gallbladder and gallbladder wall thickening at 11 cm with evidence of pericholecystic edema.    He is currently pain-free.    No prior abdominal surgeries.  Multiple orthopedic surgeries.  His right hand is currently in a cast following surgery for displaced right fifth metacarpal fracture.    He denies a person history of bleeding or clotting disorders. He denies a history of anesthetic complications. He denies a personal history of Crohn disease, ulcerative colitis, or colon cancer. He is able to climb two flights of stairs without difficulty.       Past Medical History:   Diagnosis Date    Arthritis     Coronary artery disease     with stent     Deep vein thrombosis (CMS/HCC)     in left leg     GERD (gastroesophageal reflux disease)     High cholesterol     History of transfusion     Hypertension     PONV (postoperative nausea and vomiting)     Sleep apnea     Wears partial dentures     lower      Past Surgical History:   Procedure Laterality Date    HIP ARTHROPLASTY Left     JOINT REPLACEMENT      REPLACEMENT TOTAL KNEE BILATERAL Bilateral     SHOULDER SURGERY  01/01/2015    RCR    TENDON REPAIR Left 1/5/2018    Procedure: TENDON TRANSFER OF EXTENSOR INDICIS TO EXTENSOR POLLICIS LONGUS OF LEFT THUMB;  Surgeon: Nabil Wolfe DO;  Location: [facility];  Service: Orthopedics;  Laterality: Left;     TOTAL HIP ARTHROPLASTY Left 03/21/2017    With Dr. Strong    TOTAL HIP ARTHROPLASTY  03/21/2017    WRIST SURGERY       (Not in a hospital admission)    Current Facility-Administered Medications   Medication Dose Route Frequency Provider Last Rate Last Admin    sodium chloride flush 2 mL  2 mL intravenous q8h AP Jeff Lira MD        sodium chloride flush 2 mL  2 mL intravenous PRN Jeff Lira MD        gabapentin (NEURONTIN) capsule 300 mg  300 mg oral Once Jeff Lira MD        celecoxib (CeleBREX) capsule 200 mg  200 mg oral Once Jeff Lira MD        acetaminophen (TYLENOL) tablet 1,000 mg  1,000 mg oral Once Jeff Lira MD        cefOXitin (MEFOXIN) 2,000 mg in normal saline 50 mL IVPB - MBP  2,000 mg intravenous Once Jeff Lira MD        LR infusion  50 mL/hr intravenous Continuous Jeff Lira MD         Current Outpatient Medications   Medication Sig Dispense Refill    ramipriL (ALTACE) 2.5 mg capsule Take 1 capsule (2.5 mg total) by mouth daily 90 capsule 4    atorvastatin (LIPITOR) 20 mg tablet Take 1 tablet (20 mg total) by mouth daily 90 tablet 4    metoprolol succinate XL (TOPROL-XL) 25 mg 24 hr tablet Take 1 tablet (25 mg total) by mouth daily 90 tablet 4    aspirin 81 mg EC tablet daily.       No Known Allergies  Family Status   Relation Name Status    Mother  (Not Specified)    Father  (Not Specified)    Sister  (Not Specified)    Brother  (Not Specified)    Maternal GM  (Not Specified)    Paternal GM  (Not Specified)    Paternal GF  (Not Specified)     Social History     Socioeconomic History    Marital status: Single   Tobacco Use    Smoking status: Never    Smokeless tobacco: Never   Vaping Use    Vaping Use: Never used   Substance and Sexual Activity    Alcohol use: Yes     Alcohol/week: 2.0 standard drinks of alcohol     Types: 2 Shots of liquor per week    Drug use: No     Social Determinants of Health     Tobacco Use: Low  "Risk  (4/26/2024)    Patient History     Smoking Tobacco Use: Never     Smokeless Tobacco Use: Never     Wisam Seymour MD    I have independently reviewed the patient's medical, social, surgical and family history as listed above. In addition I have reviewed their allergies and medications.     Resuscitation Status: Full code    REVIEW OF SYSTEMS:  10 points ROS positive per HPI, all other systems negative.     PHYSICAL EXAMINATION:  /84   Pulse 59   Temp 36.2 °C (97.2 °F) (Oral)   Resp 16   Ht 1.676 m (5' 6\")   Wt 88 kg (194 lb)   SpO2 91%   BMI 31.31 kg/m²     Physical Exam  Constitutional:       General: He is not in acute distress.     Appearance: He is obese. He is not ill-appearing.      Comments: Accompanied by his girlfriend.   Cardiovascular:      Rate and Rhythm: Normal rate.   Pulmonary:      Effort: Pulmonary effort is normal.   Abdominal:      General: There is no distension.      Tenderness: There is no abdominal tenderness. There is no guarding or rebound.   Musculoskeletal:      Comments: Right hand in cast for fifth metacarpal fracture.   Skin:     General: Skin is warm and dry.      Capillary Refill: Capillary refill takes less than 2 seconds.      Findings: No erythema.   Neurological:      Mental Status: He is alert and oriented to person, place, and time.   Psychiatric:         Mood and Affect: Mood normal.         Behavior: Behavior normal.           ASSESSMENT & PLAN:  73 y.o. male with acute cholecystitis.  Will plan to proceed to the operating room for laparoscopic cholecystectomy with IOC today with Dr. Lira.    I have thoroughly counseled the patient regarding their diagnosis of acute cholecystitis.      The patient was informed that the proposed procedure or medical intervention involves removal of the gallbladder and does offer a good likelihood of prevention of future attacks of abdominal discomfort.      The patient was made aware of the risks of the procedure, " including but not limited to: inability to perform the procedure laparoscopically which would require conversion to an open procedure, bleeding, infection, common bile duct injury and potential need for further surgical repair of this structure, injury to other structures and organs, cardiopulmonary complications, potential need for post op ERCP and potential for failure to alleviate any/all of their symptoms.and that difficulties may be encountered during recovery to include postoperative nausea and diarrhea.      In the course of the evaluation I did discuss other therapeutic options with the patient, including observation.  The risks and benefits of this option was discussed, as were possible problems or difficulties the patient may encounter if treatment was not pursued at this time.  These include but are not limited to: more frequent and severe attacks, acute cholecystitis, choledocholithiasis, cholangitis and gallstone pancreatitis.      The patient was informed that Dr. Lira will be primarily responsible for the procedure.   Assistance during the procedure and during hospitalization may also be provided by other physicians, nurses and technicians, some of whom may be students.      All of the patient’s or their legal representative’s questions have been answered to their satisfaction and they have indicated a clear understanding of this discussion.      Wilfrido Carballo, CNP

## 2024-04-26 NOTE — DISCHARGE INSTRUCTIONS
"  [address] [facility] [place] [address]  Phone: [phone], Fax: [phone]      CHOLECYSTECTOMY DISCHARGE INSTRUCTIONS    Follow up  Please follow up with Violet Morris CNP as scheduled on your discharge instructions (in about 2-3 weeks).   Wilfirdo is the Nurse Practitioner who works directly with your surgeon.   If you need to change this appointment, or if you have non-urgent questions, you may message Wilfrido via [facility] or call [facility] (formerly known as [facility]") in [place], [place] at [phone].    What to Expect  It is common to experience bruising, mild swelling and discomfort around the incisions. It is typical for the upper middle incision to be the most tender, as this is a larger incision than the rest.  You may experience referred pain to your shoulders. This is due to the gas used for laparoscopic surgery.  You may feel tired for several days following surgery.    Diet  You may resume your regular diet after surgery.   Some people become nauseous with the anesthesia used for surgery, so you may want to start with easy-to-digest foods and advance as tolerated.  Remember that fatty foods may cause you to have loose stools during the first few weeks to months after having your gallbladder removed    Activity  After the procedure, take it easy for the rest of the day. Due to the anesthesia you received, please no driving or making important decisions for at least 24 hours.   Do not drive while taking narcotic pain medication.    No lifting greater than 15-20 lbs. until cleared in the clinic at your follow up appointment in 2-3 weeks.    Pain and Incision Care  You may control your pain with the following regimen:   Take 1,000 mg Tylenol 3x daily scheduled to stay ahead of acute pain.  Take 7.5 mg Meloxicam once daily in the morning as needed. Take this scheduled for the first couple of days to stay ahead of pain. Do not take Ibuprofen " while taking meloxicam.  Take 500 mg Robaxin (muscle relaxor) 4x per daily to stay ahead of acute pain.  Take 5 mg Oxycodone ONLY AS NEEDED if the above pain medications are not providing adequate relief of symptoms.    Utilize ice for comfort.   You may take off the top Band-Aids and shower in 24-48 hours. Please leave the steri-strips (white pieces of medical tape) over the incisions for 1 week, or until they fall off on their own.   Do not submerge your incisions in a bath until your follow up appointment. You do not need to re-cover your incision.  Your incision has been closed with absorbable sutures that are under the skin - you will not need to have any sutures removed.     Possible Constipation Management:  You may experience constipation after this procedure (related to the anesthesia medications or pain medications used). First, make sure you are consuming plenty of water (at least 64 oz. per day) and fiber (aim for 25-30 grams per day) - this can come from leafy green vegetables, whole grains, or over the counter fiber supplement such as Metamucil). If needed, a stool softener can help. You may utilize Sennakot twice a day and Miralax as needed. Titrate the Miralax up or down depending on stool consistency.     Call for:  Persistent fever greater than 101º Fahrenheit.  Persistent vomiting or nausea.  Foul-smelling discharge from the incisions.  Pain not relieved by the prescribed pain medicine.  Bleeding, warmth, or redness around the incisions.  Unusual chest pain, shortness of breath, or leg pain.  Please do not hesitate to call with any questions or concerns.    Return to Work  Most patients can return to work in 3-7 days depending on the physical activity required for your job.    General Anesthesia Post-Operative Instructions    Due to surgery, you may feel tired or lightheaded and your judgment and motor abilities are going to be impaired for the next 24 hours. For your safety, please follow these  instructions:  DO NOT drive or operate heavy machinery for the next 24 hours.  DO NOT use alcohol or sleeping pills for the next 24 hours.  DO NOT make any critical decisions or sign any legal documents for the next 24 hours.  A responsible adult needs to stay with you for the next 24 hours. Do not stay home alone.  Limit your activity for the next 24 hours. You may return to your normal activities when your doctor gives you permission.     Anesthesia may cause nausea and vomiting in some individuals. To help prevent this, follow these tips:  Start with water, clear liquids, and light foods.  If you are tolerating light foods without any problems, you may gradually increase diet as desired.  If you were prescribed a specific post-operative diet from your surgeon, please follow as directed.  If nausea or vomiting persists, call your doctor or come into the ER after clinic hours.     To help prevent blood clots, move your feet in a [place] or up and down 10-30 times an hour while sitting or lying down.    Call your doctor if you experience:  A fever over a 100.4 F.   If you notice pus or red streaks coming from your wound.  If you notice increased redness or swelling around the wound.  If you notice increasing bleeding from your wound.       Medications    You were given:    You may have more:      If you have any problems or questions you may call:    [facility]:  [phone]  [facility]:  [phone]  [facility]:  [phone]  [facility] access hospital [address]:  [phone]  [address]:  [phone]

## 2024-04-26 NOTE — OP NOTE
[facility] OPERATIVE NOTE    [id number]  Juan Corona  4/26/2024      PRE-OPERATIVE DIAGNOSIS: acute cholecystitis    POST-OPERATIVE DIAGNOSIS: Same, chronic cholecystitis with hydrops    Procedure(s):  Laparoscopic Cholecystectomy with Intraoperative Cholangiogram    Surgeon(s) and Role:  Dr. Lira    Assistant:  Scrub Tech    ANESTHESIA:   General Endotracheal Anesthesia     INDICATIONS FOR SURGERY:   Juan Corona is a 73 y.o. with a history, exam and imaging consistent with acute cholecystitis with cholelithiasis. Juan Corona is brought to the operating room for elective cholecystectomy.    OPERATIVE FINDINGS:  Imaging Interpretation: Isovue diluted 1:1 with injectable saline was injected into the cystic duct filling the intra- and extrahepatic bile ducts. There was adequate visualization of the bifurcation. No filling defects were seen within the common bile duct and there was filling of the duodenum. 5 seconds of fluoro time was used and 2 image (s) are interpreted along with an intraoperative sine loop.        DETAILS OF PROCEDURE PERFORMED:        The patient was brought to the operating room and placed on the operating table in the supine position. General endotracheal anesthesia was administered and the patient was prepped and draped in the standard fashion. A timeout was performed and preoperative antibiotics were dosed. SCD's were in place.     The procedure was begun with a Veress entry at Jung's point. Low insufflation  Pressures were seen and there was a rapid saline drop. Pneumoperitoneum was achieved. A 5 mm supraumbilical visiport was placed under direct vision. A 12 mm epigastric port and two 5 mm right subcostal ports were then placed under direct vision      The gallbladder was retracted superiorly and laterally. The peritoneum overlying the triangle of Calot was scored medially and laterally and stripped down. The critical view of safety was obtained. Clips were used to  control a small cystic artery crossing vessel.  A single clip was then placed proximally along the cystic duct and the duct partially transected. A single stone was milked back out of the ductotomy. A cholangiocatheter was then clipped into placed and a cholangiogram performed. There was good length on the cystic duct, retrograde hepatic filling, no filling defects within the common duct and easy filling of the duodenum.    The cholangiocatheter was then removed. The cystic duct was controlled with two clips proximally. The cystic artery was controlled with two clips proximally and one distally. The duct and artery were divided sharply and the gallbladder removed from the liver bed with cautery. It was then removed in an endocatch bag through the epigastric port. The RUQ was examined and a small bleeding posterior artery branch was noted. This was clipped and after that found to be hemostatic.      The epigastric port was closed with an 0 vicryl on a suture passer x2. Ports were removed under direct vision and pneumoperitoneum released. The skin at all ports was closed with a 4-0 subcuticular monocryl.     At the end of the case all counts were reported correct.The patient tolerated the procedure well and was transported postoperatively to PACU.     ESTIMATED BLOOD LOSS:  Minimal    SPECIMEN REMOVED:     ID Type Source Tests Collected by Time   1 :  Tissue Gallbladder PATHOLOGY SPECIMEN (HISTOLOGY) Jeff Lira MD 4/26/2024 1427       IMPLANTS: None    COMPLICATIONS: None    CONDITION: Stable.    Jeff Lira MD

## 2024-04-26 NOTE — ANESTHESIA PREPROCEDURE EVALUATION
"Pre-Procedure Assessment    Patient: Juan Corona, male, 73 y.o.    Ht Readings from Last 1 Encounters:   04/26/24 1.676 m (5' 6\")     Wt Readings from Last 1 Encounters:   04/26/24 88 kg (194 lb)       Last Vitals  BP      Temp      Pulse     Resp      SpO2      Pain Score         Problem list reviewed and Medical history reviewed    History of anesthetic complications: PONV         Airway   Mallampati: II  TM distance: >3 FB  Neck ROM: full      Dental    (+) partials    Comment: Lower partial    Pulmonary - normal exam   (+) sleep apnea  (-) shortness of breath  Cardiovascular   Exercise tolerance: good (4-7 METS)  (+) hypertension well controlled, CAD, DVT  (-) angina    ECG reviewed  Rhythm: regular  Rate: normal  ROS comment: EKG 4/25/24: Sinus rhythm; multiform ventricular premature complexes; consider RVH or posterior infarct.      Mental Status/Neuro/Psych    Pt is alert.      (+) arthritis    GI/Hepatic/Renal    (+) GERD (Diet related) well controlled    Endo/Other    (+) history of blood transfusion    Comments: Today's preoperative cardiac clearance is based on the Revised Mcfarland Cardiac Risk Index (RCRI). The patient's score is = 0  This score has been based on the following risk factors for this patient:   - High-risk type of surgery = 0   - Ischemic heart disease = 0   - CHF = 0   - CVA = 0   - Diabetes mellitus requiring insulin = 0   - Creatinine >2.0 mg/dL = 0     ARISCAT Score: 11, low risk    Abdominal      Other findings: Andino       Social History     Tobacco Use    Smoking status: Never    Smokeless tobacco: Never   Substance Use Topics    Alcohol use: Yes     Alcohol/week: 2.0 standard drinks of alcohol     Types: 2 Shots of liquor per week      Hematology   WBC   Date Value Ref Range Status   04/26/2024 8.7 3.7 - 9.6 10*3/uL Final     RBC   Date Value Ref Range Status   04/26/2024 4.91 4.10 - 5.80 10*6/µL Final     MCV   Date Value Ref Range Status   04/26/2024 88.8 82.0 - 97.0 fL Final " "    Hemoglobin   Date Value Ref Range Status   04/26/2024 14.4 13.2 - 17.2 g/dL Final     Hematocrit   Date Value Ref Range Status   04/26/2024 43.6 38.0 - 50.0 % Final     Platelets   Date Value Ref Range Status   04/26/2024 127 (L) 130 - 350 10*3/uL Final      Coagulation No results found for: \"PT\", \"APTT\", \"INR\"   General Chemistry   Calcium   Date Value Ref Range Status   04/26/2024 10.0 8.6 - 10.3 mg/dL Final     BUN   Date Value Ref Range Status   04/26/2024 22 7 - 25 mg/dL Final     Creatinine   Date Value Ref Range Status   04/26/2024 0.86 0.70 - 1.30 mg/dL Final     Glucose   Date Value Ref Range Status   04/26/2024 95 70 - 105 mg/dL Final     Sodium   Date Value Ref Range Status   04/26/2024 140 135 - 145 mmol/L Final     Potassium   Date Value Ref Range Status   04/26/2024 4.0 3.5 - 5.1 MMOL/L Final     Magnesium   Date Value Ref Range Status   08/06/2019 1.9 1.8 - 2.4 mg/dL Final     CO2   Date Value Ref Range Status   04/26/2024 25 21 - 32 mmol/L Final     Chloride   Date Value Ref Range Status   04/26/2024 105 98 - 107 mmol/L Final     Anesthesia Plan    ASA 3   NPO status reviewed: > 8 hours    General         Induction: intravenous   Airway Planning: oral ET tube    Additional Comments: H20 at 2200 4/25/24      Plan for postoperative opioid use.    Anesthetic plan and risks discussed with patient.  Use of blood products discussed with patient who consented to blood products.                    "

## 2024-04-26 NOTE — CONSULTATION
General Surgery H&P     Date: 4/26/2024   Patient: Juan Corona   MRN: [id number]        HISTORY OF PRESENT ILLNESS:  Juan Corona is a 73 y.o. male referred for consideration of cholecystectomy due to acute cholecystitis.  Over the last 2 to 3 weeks he has had pain in his high right upper quadrant/right chest.  Sometimes worsened by food but not always associated with meals.     He presented to the ED last night for evaluation of this right chest pain.  After an overall reassuring cardiopulmonary workup (see ED provider note for complete details), a right upper quadrant ultrasound showed cholelithiasis with distended gallbladder and gallbladder wall thickening at 11 cm with evidence of pericholecystic edema.    He is currently pain-free.    No prior abdominal surgeries.  Multiple orthopedic surgeries.  His right hand is currently in a cast following surgery for displaced right fifth metacarpal fracture.    He denies a person history of bleeding or clotting disorders. He denies a history of anesthetic complications. He denies a personal history of Crohn disease, ulcerative colitis, or colon cancer. He is able to climb two flights of stairs without difficulty.       Past Medical History:   Diagnosis Date    Arthritis     Coronary artery disease     with stent     Deep vein thrombosis (CMS/HCC)     in left leg     GERD (gastroesophageal reflux disease)     High cholesterol     History of transfusion     Hypertension     PONV (postoperative nausea and vomiting)     Sleep apnea     Wears partial dentures     lower      Past Surgical History:   Procedure Laterality Date    HIP ARTHROPLASTY Left     JOINT REPLACEMENT      REPLACEMENT TOTAL KNEE BILATERAL Bilateral     SHOULDER SURGERY  01/01/2015    RCR    TENDON REPAIR Left 1/5/2018    Procedure: TENDON TRANSFER OF EXTENSOR INDICIS TO EXTENSOR POLLICIS LONGUS OF LEFT THUMB;  Surgeon: Nabil Wolfe DO;  Location: [facility];  Service: Orthopedics;  Laterality: Left;     TOTAL HIP ARTHROPLASTY Left 03/21/2017    With Dr. Strong    TOTAL HIP ARTHROPLASTY  03/21/2017    WRIST SURGERY       (Not in a hospital admission)    Current Facility-Administered Medications   Medication Dose Route Frequency Provider Last Rate Last Admin    sodium chloride flush 2 mL  2 mL intravenous q8h AP Jeff Lira MD        sodium chloride flush 2 mL  2 mL intravenous PRN Jeff Lira MD        gabapentin (NEURONTIN) capsule 300 mg  300 mg oral Once Jeff Lira MD        celecoxib (CeleBREX) capsule 200 mg  200 mg oral Once Jeff Lira MD        acetaminophen (TYLENOL) tablet 1,000 mg  1,000 mg oral Once Jeff Lira MD        cefOXitin (MEFOXIN) 2,000 mg in normal saline 50 mL IVPB - MBP  2,000 mg intravenous Once Jeff Lira MD        LR infusion  50 mL/hr intravenous Continuous Jeff Lira MD         Current Outpatient Medications   Medication Sig Dispense Refill    ramipriL (ALTACE) 2.5 mg capsule Take 1 capsule (2.5 mg total) by mouth daily 90 capsule 4    atorvastatin (LIPITOR) 20 mg tablet Take 1 tablet (20 mg total) by mouth daily 90 tablet 4    metoprolol succinate XL (TOPROL-XL) 25 mg 24 hr tablet Take 1 tablet (25 mg total) by mouth daily 90 tablet 4    aspirin 81 mg EC tablet daily.       No Known Allergies  Family Status   Relation Name Status    Mother  (Not Specified)    Father  (Not Specified)    Sister  (Not Specified)    Brother  (Not Specified)    Maternal GM  (Not Specified)    Paternal GM  (Not Specified)    Paternal GF  (Not Specified)     Social History     Socioeconomic History    Marital status: Single   Tobacco Use    Smoking status: Never    Smokeless tobacco: Never   Vaping Use    Vaping Use: Never used   Substance and Sexual Activity    Alcohol use: Yes     Alcohol/week: 2.0 standard drinks of alcohol     Types: 2 Shots of liquor per week    Drug use: No     Social Determinants of Health     Tobacco Use: Low  "Risk  (4/26/2024)    Patient History     Smoking Tobacco Use: Never     Smokeless Tobacco Use: Never     Wisam Seymour MD    I have independently reviewed the patient's medical, social, surgical and family history as listed above. In addition I have reviewed their allergies and medications.     Resuscitation Status: Full code    REVIEW OF SYSTEMS:  10 points ROS positive per HPI, all other systems negative.     PHYSICAL EXAMINATION:  /84   Pulse 59   Temp 36.2 °C (97.2 °F) (Oral)   Resp 16   Ht 1.676 m (5' 6\")   Wt 88 kg (194 lb)   SpO2 91%   BMI 31.31 kg/m²     Physical Exam  Constitutional:       General: He is not in acute distress.     Appearance: He is obese. He is not ill-appearing.      Comments: Accompanied by his girlfriend.   Cardiovascular:      Rate and Rhythm: Normal rate.   Pulmonary:      Effort: Pulmonary effort is normal.   Abdominal:      General: There is no distension.      Tenderness: There is no abdominal tenderness. There is no guarding or rebound.   Musculoskeletal:      Comments: Right hand in cast for fifth metacarpal fracture.   Skin:     General: Skin is warm and dry.      Capillary Refill: Capillary refill takes less than 2 seconds.      Findings: No erythema.   Neurological:      Mental Status: He is alert and oriented to person, place, and time.   Psychiatric:         Mood and Affect: Mood normal.         Behavior: Behavior normal.           ASSESSMENT & PLAN:  73 y.o. male with acute cholecystitis.  Will plan to proceed to the operating room for laparoscopic cholecystectomy with IOC today with Dr. Lira.    I have thoroughly counseled the patient regarding their diagnosis of acute cholecystitis.      The patient was informed that the proposed procedure or medical intervention involves removal of the gallbladder and does offer a good likelihood of prevention of future attacks of abdominal discomfort.      The patient was made aware of the risks of the procedure, " including but not limited to: inability to perform the procedure laparoscopically which would require conversion to an open procedure, bleeding, infection, common bile duct injury and potential need for further surgical repair of this structure, injury to other structures and organs, cardiopulmonary complications, potential need for post op ERCP and potential for failure to alleviate any/all of their symptoms.and that difficulties may be encountered during recovery to include postoperative nausea and diarrhea.      In the course of the evaluation I did discuss other therapeutic options with the patient, including observation.  The risks and benefits of this option was discussed, as were possible problems or difficulties the patient may encounter if treatment was not pursued at this time.  These include but are not limited to: more frequent and severe attacks, acute cholecystitis, choledocholithiasis, cholangitis and gallstone pancreatitis.      The patient was informed that Dr. Lira will be primarily responsible for the procedure.   Assistance during the procedure and during hospitalization may also be provided by other physicians, nurses and technicians, some of whom may be students.      All of the patient’s or their legal representative’s questions have been answered to their satisfaction and they have indicated a clear understanding of this discussion.      Wilfrido Carballo, CNP

## 2024-04-26 NOTE — ED NOTES
Patient was moved from room 6 to room 7 for patient and his significant other's comfort.  He will stay until the surgeon reviews his case in the morning.     Bekah Marx RN  04/26/24 0608

## 2024-04-26 NOTE — ED PROVIDER NOTES
"HPI:   Chief Complaint   Patient presents with    Chest Pain     More of a pressure       This is a 73-year-old male who comes to the emergency department today for \"not feeling well\".  Patient reports between 10 and 11:00 at night he started to have a pressure on his chest.  He reports that it was very intense and felt like a severe indigestion and reflux.  He reports he had no shortness of breath with that, he reports he felt very hot and all of a sudden got very sweaty.  He reports that he never got lightheaded or had presyncope.  He reports he never felt nauseated or vomited.  He never had any shortness of breath or coughing.  He reports the symptoms resolved on their own after about 45 minutes to an hour.  He woke up his girlfriend from sleep because he was very concerned about them.  She reports that it is very unlike him to ever complain of anything, or ever want to go to the hospital.  She was very concerned and gave him 324 mg of baby aspirin and brought him to the ER.    He is currently pain-free and has absolutely no complaints.        PE:   ED Triage Vitals [04/26/24 0030]   Temp Heart Rate Resp BP SpO2   36.2 °C (97.2 °F) 74 11 135/85 95 %      Mean BP (mmHg) Temp Source Heart Rate Source Patient Position BP Location   109 Oral -- In bed Left arm      FiO2 (%)       --           Physical Exam  Vitals and nursing note reviewed.   Constitutional:       General: He is not in acute distress.     Appearance: He is well-developed.   HENT:      Head: Normocephalic and atraumatic.   Eyes:      Conjunctiva/sclera: Conjunctivae normal.   Cardiovascular:      Rate and Rhythm: Normal rate and regular rhythm.      Heart sounds: No murmur heard.  Pulmonary:      Effort: Pulmonary effort is normal. No respiratory distress.      Breath sounds: Normal breath sounds.   Abdominal:      Palpations: Abdomen is soft.      Tenderness: There is no abdominal tenderness.   Musculoskeletal:         General: No swelling.      " Cervical back: Neck supple.   Skin:     General: Skin is warm and dry.      Capillary Refill: Capillary refill takes less than 2 seconds.   Neurological:      Mental Status: He is alert.   Psychiatric:         Mood and Affect: Mood normal.         ED procedures:   ECG 12 lead - Chest pain    Performed by: Giulia Mackenzie MD  Authorized by: Giulia Mackenzie MD    Comments:      Rate is 73.  Rhythm is regular.  Intervals are within normal limits.  ST segments are within normal limits.  There is no arrhythmia, there is ectopy, singular PVC.  There is no STEMI.  Overall impression is a sinus rhythm.  When compared with previous EKG, there is no morphologic change.      ED Course:          Medical Decision Making      This is a 73-year-old male with episode of chest pain.    I have reviewed notes from previous providers in the patient's chart, as well as recent lab results and imaging results.  This showed: Coronary artery disease with stent, thrombocytopenia, BPH, sleep apnea, history of DVT in leg, hypertension.  Echocardiogram May 2023 shows normal ejection fraction of 63% moderate left ventricular diastolic abnormality atrial dilation bilaterally no effusion mild aortic regurgitation.    Differential diagnosis includes: ACS, pulmonary embolism, aortic dissection, GERD, pancreatitis, cholecystitis, musculoskeletal pain.    Patient was placed on telemetry.  This was closely monitored by myself and reveals regular rate and rhythm.    Laboratory studies reveal CBC with no leukocytosis, no anemia, there is mild thrombocytopenia, this appears new.  CMP shows a normal creatinine, normal liver function, and normal pancreatic function and normal electrolytes.  D-dimer is mildly elevated at 842.  Initial troponin is 8.8, trend is reassuring..  BNP is 109.    Imaging was reviewed by myself.  This independent interpretation shows chest x-ray with enlarged heart, no pneumothorax, no pneumonia.    Discussion of management  options was performed with patient and girlfriend, who understood and agreed with treatment plan.    Patient has remained pain-free here, his EKG is reassuring, troponin trend is reassuring, CT angiogram has ruled out aortic emergency and pulmonary embolism. There is some pericholecystic inflammation but without any evidence of gallstone on CT.  No biliary ductal dilation.  There is no leukocytosis, elevated LFTs, or lipase to suggest cholecystitis however.  He is currently pain-free, I discussed with him getting an ultrasound tonight, or getting an outpatient in the morning.  He would like to proceed with that tonight.    Ultrasound shows shadowing gallstone, galls bladder is distended measuring 11 cm with wall thickening measuring 4.3 mm and pericholecystic edema.  No sonographic Mendes sign.  This is suspicious for acute cholecystitis.    He has continued to remain pain-free.  He has no tenderness in the right upper quadrant on palpation.  I discussed this with Dr. Lira who is the on-call surgeon.  He has recommended cholecystectomy.  Patient is agreeable to the plan.  He will be held in the emergency department for Dr. Lira to evaluate him.    I am not clear on whether the gallbladder was the cause of his pain last night, and I have told him this, however with ultrasound findings of cholecystitis, whether or not this is the cause of his pain last night, it does need to be addressed.  Patient understands that and is in agreement with the plan to take out his gallbladder.  He will likely need further follow-up regarding this chest pain, especially if he has any other episodes.      Given   Medications   sodium chloride flush 2 mL (has no administration in time range)   sodium chloride flush 2 mL (has no administration in time range)   gabapentin (NEURONTIN) capsule 300 mg (has no administration in time range)   celecoxib (CeleBREX) capsule 200 mg (has no administration in time range)   acetaminophen  (TYLENOL) tablet 1,000 mg (has no administration in time range)   cefOXitin (MEFOXIN) 2,000 mg in normal saline 50 mL IVPB - MBP (has no administration in time range)   LR infusion (has no administration in time range)   iopamidoL (ISOVUE-370) 370 mg iodine /mL (76 %) injection 80 mL (80 mL intravenous Given 4/26/24 0135)       At the time of discharge the patient was in good condition with stable vital signs.      Clinical Impression:    Final diagnoses:   [R07.9] Chest pain, unspecified type   [K80.12] Calculus of gallbladder with acute on chronic cholecystitis without obstruction              Giulia Mackenzie MD  04/26/24 2823

## 2024-04-26 NOTE — PERIOPERATIVE NURSING NOTE
Pt. stable upon discharge.  Pt and family member, S/O Ilsa, verbalized understanding of discharge instruction, education and followup appointment.  Pt escorted to vehicle with all belongings.

## 2024-04-26 NOTE — ANESTHESIA POSTPROCEDURE EVALUATION
Patient: Juan Corona    Procedure Summary       Date: 04/26/24 Room / Location: [facility] OR 03 / [facility] OR    Anesthesia Start: 1343 Anesthesia Stop: 1532    Procedure: LAPAROSCOPIC CHOLECYSTECTOMY WITH CHOLANGIOGRAM (Abdomen) Diagnosis:       Calculus of gallbladder with acute on chronic cholecystitis without obstruction      (Calculus of gallbladder with acute on chronic cholecystitis without obstruction [K80.12])    Surgeons: Jeff Lira MD Responsible Provider: Shanthi Brush CRNA    Anesthesia Type: general ASA Status: 3            Anesthesia Type: general    Last vitals   Vitals Value Taken Time   /74 04/26/24 1545   Temp 36 °C (96.8 °F) 04/26/24 1535   Pulse 75 04/26/24 1550   Resp 10 04/26/24 1540   SpO2 95 % 04/26/24 1550   Pain Score         Anesthesia Post Evaluation    Patient location during evaluation: PACU  Patient participation: complete - patient participated  Level of consciousness: awake and alert  Pain management: adequate  Airway patency: patent  Cardiovascular status: acceptable  Respiratory status: acceptable  Hydration status: acceptable  May dismiss recovered patient based on consultation with the appropriate physicians and/or meeting appropriate discharge criteria     There were no known notable events for this encounter.    Cosmetic?  This procedure is not cosmetic.

## 2024-04-30 ENCOUNTER — TELEPHONE (OUTPATIENT)
Dept: SURGERY | Facility: CLINIC | Age: 74
End: 2024-04-30
Payer: MEDICARE

## 2024-04-30 NOTE — TELEPHONE ENCOUNTER
Patient had surgery on Friday for Gallbladder  On the right side almost to his back he feels like that is a sharp stabbing when he takes a deep breath or when walking. Encouraged him to continue to walk to work the gas out. If symptoms worsen and the pain is unbearable, encouraged him to go to the emergency room. Otherwise if the symptoms have not improved by Friday, encouraged him to contact the clinic.   Returned call to patient and she wanted to see Dr Carroll Winter at the [place] office. Scheduled and per the patient she has albino garvey and that is all her card said. Was trying to verify to make sure that it is not mediblu. We do not have a copy of the card.

## 2024-05-09 ENCOUNTER — OFFICE VISIT (OUTPATIENT)
Dept: SURGERY | Facility: CLINIC | Age: 74
End: 2024-05-09
Payer: MEDICARE

## 2024-05-09 VITALS
HEIGHT: 66 IN | OXYGEN SATURATION: 96 % | BODY MASS INDEX: 30.7 KG/M2 | DIASTOLIC BLOOD PRESSURE: 60 MMHG | HEART RATE: 57 BPM | WEIGHT: 191 LBS | SYSTOLIC BLOOD PRESSURE: 110 MMHG | TEMPERATURE: 97.8 F

## 2024-05-09 DIAGNOSIS — Z09 POSTOPERATIVE FOLLOW-UP: Primary | ICD-10-CM

## 2024-05-09 PROCEDURE — 99024 POSTOP FOLLOW-UP VISIT: CPT

## 2024-05-09 ASSESSMENT — PAIN SCALES - GENERAL: PAINLEVEL: 3

## 2024-05-09 NOTE — PROGRESS NOTES
"    GENERAL SURGERY CLINIC POST-OPERATIVE CHECK    Patient:  Juan Corona  MRN:  [id number]  Date of Service:  5/9/2024      SUBJECTIVE:  HPI  Juan Corona is a 73 y.o. male who presents to clinic unaccompanied after a laparoscopic cholecystectomy with cholangiogram performed on 4/26/2024 by Dr. Lira.   The patient reports that since discharge from the hospital he has done very well. Is no longer taking pain medication. Eating and drinking well. Normal bowel and bladder function.  He does report he has pain to his back on the right middle.  He does state he feels this is getting better however is no longer utilizing medications for this discomfort.  He states it does not make him feel short of breath and that he is able to take a deep breath without discomfort.  Denies symptoms of systemic or localized site infection or VTE. Overall, he is very happy with this procedure and recovery thus far. A brief ROS was reviewed today and otherwise negative.    Review of Systems:  Negative except per HPI.       OBJECTIVE:  Vital Signs  /60   Pulse 57   Temp 36.6 °C (97.8 °F) (Temporal)   Ht 1.676 m (5' 6\")   Wt 86.6 kg (191 lb)   SpO2 96%   BMI 30.83 kg/m²     Physical Exam:   Physical Exam  Constitutional:       Appearance: Normal appearance.   Cardiovascular:      Rate and Rhythm: Normal rate.      Pulses: Normal pulses.   Pulmonary:      Effort: Pulmonary effort is normal.   Abdominal:      General: There is no distension.      Palpations: Abdomen is soft.      Tenderness: There is no abdominal tenderness. There is no guarding or rebound.      Hernia: No hernia is present.   Skin:     General: Skin is warm and dry.      Comments: Surgical incisions healing appropriately without erythema, drainage, dehiscence, or evidence of infection.    Neurological:      Mental Status: He is alert and oriented to person, place, and time.   Psychiatric:         Mood and Affect: Mood normal.         Behavior: Behavior " normal.          Pathology:   Microscopic Pathological Diagnosis:   Gallbladder, cholecystectomy:   - Acute and chronic cholecystitis with cholelithiasis.       ASSESSMENT/PLAN:  Juan Corona is a 73 y.o. male who is 2 weeks + 1 days status post laparoscopic cholecystectomy with cholangiogram and is progressing well.  Encouraged to restart his meloxicam and follow-up via telephone visit next week Thursday in regards to his right sided mid back pain.  Advised that he may utilize ice or massage as well in the event this is muscular.  Advised that he may advance activity as tolerated at this point, letting discomfort be his guide.       Juan Corona was advised to RTC or call for any future surgical concerns. He participated in the decision making process and agreed with the above plan.  All questions were sought and answered to their satisfaction.      A voice recognition program was used to aid in medical record documentation. Sometimes words are printed not exactly as they were spoken. While efforts were made to carefully edit and correct any inaccuracies, some errors may be present and should be taken within the context of the discussion.  Please contact our office if you need assistance interpreting this medical record or notice any mistakes.    Violet Morris, CNP   5/9/2024  1:42 PM

## 2024-05-12 DIAGNOSIS — I25.10 CORONARY ARTERY DISEASE INVOLVING NATIVE CORONARY ARTERY OF NATIVE HEART WITHOUT ANGINA PECTORIS: ICD-10-CM

## 2024-05-13 RX ORDER — RAMIPRIL 2.5 MG/1
2.5 CAPSULE ORAL DAILY
Qty: 90 CAPSULE | Refills: 3 | Status: SHIPPED | OUTPATIENT
Start: 2024-05-13

## 2024-05-13 RX ORDER — ATORVASTATIN CALCIUM 20 MG/1
20 TABLET, FILM COATED ORAL DAILY
Qty: 90 TABLET | Refills: 1 | Status: SHIPPED | OUTPATIENT
Start: 2024-05-13 | End: 2024-10-14

## 2024-05-13 NOTE — TELEPHONE ENCOUNTER
Medication refill request:  Medication(s):  lipitor not filled due to Lab (s) are due and Patient does not have a future appointment scheduled within 90 days

## 2024-05-13 NOTE — TELEPHONE ENCOUNTER
Care Due:                  Date            Visit Type   Department     Provider  --------------------------------------------------------------------------------                                           [facility]  Last Visit: 04-      PRE OP       MEDICINE       JULIANNE ARORA  Next Visit: None Scheduled  None         None Found                                                            Last  Test          Frequency    Reason                     Performed    Due Date  --------------------------------------------------------------------------------  Office Visit  12 months..  atorvastatin, metoprolol,   05- 04-                             ramipriL.................    Lipid Panel.  12 months..  atorvastatin.............  Not Found    Overdue    [employer] Embedded Care Due Messages. Reference number: [id number]. 5/12/2024 8:22:36 PM ALANNA

## 2024-05-14 NOTE — PROGRESS NOTES
GENERAL SURGERY CLINIC POST-OPERATIVE CHECK    Patient:  Juan Corona  MRN:  [id number]  Date of Service:  5/14/2024      SUBJECTIVE:  HPI  TELEPHONE VISIT CONSENT:  Per discussion with Violet Morris CNP, Juan, has verbally consented to be treated via a telephone based visit: Yes. A total of 15 minutes were required for this telephone based visit.   Patient Location: Home  Provider Location: Clinic  Technology used by Provider: Phone     Juan Corona is a 73 y.o. male who was contacted via telephone for postoperative follow up after a laparoscopic cholecystectomy with cholangiogram performed on 4/26/2024 by Dr. Lira. The patient reports that since his last clinic appointment he has done very well. Is no longer taking pain medication. Eating and drinking well. Normal bowel and bladder function.  He states the back pain he previously noted at his postoperative appointment has much improved with time.  It is not completely gone but he states has significantly improved.  Denies symptoms of systemic or localized site infection or VTE. Overall, he is very happy with this procedure and recovery thus far. A brief ROS was reviewed today and otherwise negative.    Review of Systems:  Negative except per HPI      OBJECTIVE:  No vital signs or physical exam performed today due to this being a telephone encounter.       ASSESSMENT/PLAN:  Juan Corona is a 73 y.o. male who is 2 weeks + 5 days status post laparoscopic cholecystectomy with cholangiogram and is progressing well.  Advised that he may advance activity as tolerated at this point, letting discomfort be their guide.  No further postoperative follow up is necessary.      Juan Corona was advised to RTC or call for any future surgical concerns. He participated in the decision making process and agreed with the above plan.  All questions were sought and answered to their satisfaction.        Violet Morris CNP   5/14/2024  4:15 PM

## 2024-05-15 ENCOUNTER — TELEPHONE - BILLABLE (OUTPATIENT)
Dept: SURGERY | Facility: CLINIC | Age: 74
End: 2024-05-15
Payer: MEDICARE

## 2024-05-15 DIAGNOSIS — Z09 POSTOPERATIVE FOLLOW-UP: Primary | ICD-10-CM

## 2024-05-15 PROCEDURE — 99024 POSTOP FOLLOW-UP VISIT: CPT

## 2024-05-27 DIAGNOSIS — I25.10 CORONARY ARTERY DISEASE INVOLVING NATIVE CORONARY ARTERY OF NATIVE HEART WITHOUT ANGINA PECTORIS: ICD-10-CM

## 2024-05-28 RX ORDER — METOPROLOL SUCCINATE 25 MG/1
25 TABLET, EXTENDED RELEASE ORAL DAILY
Qty: 90 TABLET | Refills: 0 | Status: SHIPPED | OUTPATIENT
Start: 2024-05-28 | End: 2024-08-05

## 2024-05-28 NOTE — TELEPHONE ENCOUNTER
No care due was identified.  [facility] Embedded Care Due Messages. Reference number: [id number]. 5/27/2024 8:06:33 PM MDT

## 2024-08-03 DIAGNOSIS — I25.10 CORONARY ARTERY DISEASE INVOLVING NATIVE CORONARY ARTERY OF NATIVE HEART WITHOUT ANGINA PECTORIS: ICD-10-CM

## 2024-08-04 NOTE — TELEPHONE ENCOUNTER
Care Due:                  Date            Visit Type   Department     Provider  --------------------------------------------------------------------------------                                           [facility]  Last Visit: 04-      PRE OP       MEDICINE       JULIANNE ARORA  Next Visit: None Scheduled  None         None Found                                                            Last  Test          Frequency    Reason                     Performed    Due Date  --------------------------------------------------------------------------------  Office Visit  12 months..  atorvastatin, metoprolol,   05- 04-                             ramipriL.................    Lipid Panel.  12 months..  atorvastatin.............  Not Found    Overdue    [employer] Embedded Care Due Messages. Reference number: [id number]. 8/03/2024 8:31:20 PM ALANNA

## 2024-08-05 RX ORDER — METOPROLOL SUCCINATE 25 MG/1
25 TABLET, EXTENDED RELEASE ORAL DAILY
Qty: 90 TABLET | Refills: 3 | Status: SHIPPED | OUTPATIENT
Start: 2024-08-05

## 2024-08-13 ENCOUNTER — TELEPHONE (OUTPATIENT)
Dept: UROLOGY | Facility: CLINIC | Age: 74
End: 2024-08-13
Payer: MEDICARE

## 2024-08-13 ENCOUNTER — TELEPHONE (OUTPATIENT)
Dept: FAMILY MEDICINE | Facility: CLINIC | Age: 74
End: 2024-08-13
Payer: MEDICARE

## 2024-08-13 NOTE — TELEPHONE ENCOUNTER
Call Type: Incoming Voicemail    [Incoming VMs Only]  Left On (Date/Time): 8/13 at 11:44am and 11:48am    Pt Name: Juan Corona  URO Provider: No assigned provider requesting to see Dr. Burr  Reason For Call: scheduling    Call Notes:   The patient and an unknown female (no name was left in the voicemail) both called and left voicemails for the patient stating they wanted to schedule an appointment. I do not see where the patient has ever been seen in the urology clinic before, they do not have an active or finalized referral, as well as nothing in the patients  or the inbound faxes. I returned a call to the patient at [phone] to get some more information as to what they are needing to be seen for.     Diagnosis: cramps in lower stomach    Patient states he urinates okay, bowel movements are not spot on. Patient states he only has cramping in his stomach. He stated it was not actually the stomach but more so the intestines. I informed the patient that at Urology, we do not see diagnosis that have to do with the intestines and that would be gastroenterology. The patient stated that he thinks he could be having issues with his bladder, his kidneys and his prostate. He has not seen a primary care provider in regards of this and states he has been experiencing the cramping for a couple of months on and off. He states that now it seems like it is there all the time. He states that he does feel better after he urinates.           [CALL CENTER INSTRUCTIONS]  On Return Call, Transfer to: Main PAS Line  Ok to Transfer During Shutdown?: No

## 2024-08-13 NOTE — TELEPHONE ENCOUNTER
Called patient back to gather more information and discuss. Patient stated that he set up an appointment with a primary care and figured he would have them send a referral if needed. Agreed with patient that would be best but advised him to contact he clinic with any other questions. Patient verbalized understanding and had no further concerns at this time.

## 2024-08-13 NOTE — TELEPHONE ENCOUNTER
Referral Request     Pulmonology   Sleep Apnea   To Be Scheduled  Upload to Epic    Please send and call with any questions.

## 2024-08-30 ENCOUNTER — OFFICE VISIT (OUTPATIENT)
Dept: FAMILY MEDICINE | Facility: CLINIC | Age: 74
End: 2024-08-30
Payer: MEDICARE

## 2024-08-30 VITALS
BODY MASS INDEX: 32.38 KG/M2 | RESPIRATION RATE: 16 BRPM | DIASTOLIC BLOOD PRESSURE: 78 MMHG | TEMPERATURE: 97.5 F | OXYGEN SATURATION: 95 % | HEART RATE: 75 BPM | SYSTOLIC BLOOD PRESSURE: 132 MMHG | WEIGHT: 200.6 LBS

## 2024-08-30 DIAGNOSIS — G47.33 SEVERE OBSTRUCTIVE SLEEP APNEA: ICD-10-CM

## 2024-08-30 DIAGNOSIS — K59.04 CHRONIC IDIOPATHIC CONSTIPATION: Primary | ICD-10-CM

## 2024-08-30 DIAGNOSIS — Z12.11 COLON CANCER SCREENING: ICD-10-CM

## 2024-08-30 DIAGNOSIS — K21.9 GASTROESOPHAGEAL REFLUX DISEASE WITHOUT ESOPHAGITIS: ICD-10-CM

## 2024-08-30 PROCEDURE — G0463 HOSPITAL OUTPT CLINIC VISIT: HCPCS | Performed by: STUDENT IN AN ORGANIZED HEALTH CARE EDUCATION/TRAINING PROGRAM

## 2024-08-30 PROCEDURE — 99213 OFFICE O/P EST LOW 20 MIN: CPT | Performed by: STUDENT IN AN ORGANIZED HEALTH CARE EDUCATION/TRAINING PROGRAM

## 2024-08-30 RX ORDER — AMOXICILLIN 500 MG/1
1000 CAPSULE ORAL 2 TIMES DAILY
COMMUNITY
Start: 2024-08-28

## 2024-08-30 NOTE — PATIENT INSTRUCTIONS
I would encourage you to start a fiber supplement, like Metamucil or Citrucel. Take 1 tablespoon and mix it in 8 ounces of water every morning.     Please also continue to take ClearLAX or MiraLAX in addition to the fiber supplement. Start with 1 capful. You may dissolve it in the same water as the fiber. If after 3 days you still do not have soft stools, increase the MiraLAX to 1 1/2 capful. Again after 3 days, if your stools are not soft then increase to 2 capfuls. Continue to do this until you have soft, daily stools. If your stools become too loose, then decrease the MiraLAX by 1/2 capful.    Please make it a goal to drink 70-80 ounces of water daily.

## 2024-08-30 NOTE — PROGRESS NOTES
OUTPATIENT PROGRESS NOTE    Subjective   History of Present Illness    Juan, a patient with a history of multiple surgeries including gallbladder removal, presents with lower abdominal cramping that has been ongoing for a couple of months. The cramping is chronic and does not seem to be influenced by any specific activities or time of day. He reports that the cramping has been less severe this week, which he attributes to a healthier diet with more fiber. He denies fevers, nausea, diarrhea. He has been able to eat and drink normally.    In addition to the abdominal cramping, Juan also reports chronic constipation, with bowel movements occurring every two to three days. He has been taking Clearlax for about a month to manage this, with variable results. He also mentions chronic heartburn, which he has experienced his whole life and manages with over-the-counter medications like Pepcid and Tums.    Juan also reports a lack of energy and pep, which he attributes to pain in his hips and back that makes walking difficult. He denies any changes to his medications in the last two months and any significant health changes around the time the cramping started.             Objective   /78 (BP Location: Left arm, Patient Position: Sitting, Cuff Size: Regular Adult)   Pulse 75   Temp 36.4 °C (97.5 °F) (Temporal)   Resp 16   Wt 91 kg (200 lb 9.6 oz)   SpO2 95%   BMI 32.38 kg/m²   EXAM:  Physical Exam  Vitals and nursing note reviewed.   Constitutional:       General: He is not in acute distress.     Appearance: He is not ill-appearing.   HENT:      Nose: Nose normal.      Mouth/Throat:      Mouth: Mucous membranes are moist.      Pharynx: Oropharynx is clear.   Neck:      Thyroid: No thyroid mass, thyromegaly or thyroid tenderness.   Cardiovascular:      Rate and Rhythm: Normal rate and regular rhythm.      Pulses: Normal pulses.      Heart sounds: Normal heart sounds. No murmur heard.  Pulmonary:      Effort:  Pulmonary effort is normal. No respiratory distress.      Breath sounds: Normal breath sounds. No wheezing.   Abdominal:      General: Bowel sounds are normal. There is no distension.      Palpations: Abdomen is soft.      Tenderness: There is no abdominal tenderness. There is no right CVA tenderness, left CVA tenderness, guarding or rebound.   Musculoskeletal:      Cervical back: Neck supple.      Right lower leg: No edema.      Left lower leg: No edema.   Skin:     General: Skin is warm and dry.      Capillary Refill: Capillary refill takes less than 2 seconds.      Findings: No rash.   Neurological:      General: No focal deficit present.      Mental Status: He is alert.   Psychiatric:         Mood and Affect: Mood normal.         Behavior: Behavior normal.           Assessment and Plan:    Problem List Items Addressed This Visit       Severe obstructive sleep apnea - Primary    Relevant Orders    Ambulatory referral to Neurology     Other Visit Diagnoses       Colon cancer screening        Relevant Orders    Ambulatory referral to General Surgery            Assessment/Plan     Chronic Constipation  Abdominal Cramping  Patient reports infrequent bowel movements (every 2-3 days) and hard stools. Currently taking Clearlax inconsistently. No improvement in symptoms despite Clearlax use for approximately one month.  -Continue Clearlax daily, titrate dose based on stool consistency and frequency.  -Start fiber supplement (Metamucil or Citrucel) daily.  -Increase water intake to 70-80 ounces daily.  -Return for follow-up if symptoms persist despite these interventions.    Gastroesophageal Reflux Disease (GERD)  Chronic heartburn managed with over-the-counter Pepcid and Tums. No recent changes in severity or frequency.  -Continue Pepcid and Tums as needed.    Sleep Apnea  Managed with CPAP machine. Requires annual follow-up with neurologist for insurance purposes.  -Referral to neurologist for annual follow-up.    Colon  Cancer Screening  Patient has not had a colonoscopy in many years.  -Referral to general surgery for screening colonoscopy.    Thyroid Health  No family history of thyroid problems. Patient reports feeling tired and lacking energy. No physical abnormalities detected on thyroid palpation. Offered to check TSH today, but patient declines.  -Consider future thyroid function testing if symptoms persist.            The patient expressed understanding and agreement to the plan.  Return if symptoms worsen or fail to improve.    Studies due at next visit: CMP, CBC, TSH    Holli Carbone MD  09/02/24 10:16 PM

## 2024-09-02 PROBLEM — R06.83 SNORING: Status: RESOLVED | Noted: 2023-10-04 | Resolved: 2024-09-02

## 2024-09-13 NOTE — PROGRESS NOTES
"09/16/24    Chief Complaint   Patient presents with    Sleep Apnea         SUBJECTIVE:     HPI:    Juan Corona is a 74 y.o. male who [facility] for [facility].       In regard to the pt's most recent sleep study:              Sleep study date: 8/28/2023  Weight at time of study: 89 kg  AHI: 44.3  Minimum O2 saturation: 87%  Recommended: CPAP  Recommended pressure: 10 cm H2O  Recommended O2: None  PLMS index: 0  PLMS arousal awakening index: 0    Regarding the pt's current PAP setup:   Machine: AirSense 11   Mode: CPAP  Mask type: Nasal pillows  Chin strap use: No  Patient tolerating therapy: Yes  DME vendor: [employer]      Patient is here for essentially an annual CPAP follow-up.  I did not see him for his first sleep follow-up to document compliance, he did that in [place] with an alternate provider.  He stated that CPAP therapy has been \"awesome\".  He does get a benefit out of it primarily and that he no longer snores.  No issues with somnolence and impact driving safely.  No issues with dry mouth, or morning headaches.  He had no other concerns about CPAP therapy at today's visit.        ROS:  General: Denied daytime somnolence, driving while drowsy.  HEENT: Denied dry mouth.  Pulmonary: Denied snoring, choking/gasping during sleep.  Cardiovascular: Denied leg edema, palpitations.  Neurologic: Denied morning headaches; difficulty with attention/memory.     Allergies: No Known Allergies    Histories:    PMHx:   Past Medical History:   Diagnosis Date    Arthritis     Coronary artery disease     with stent     Deep vein thrombosis (CMS/HCC)     in left leg     GERD (gastroesophageal reflux disease)     High cholesterol     History of transfusion     Hypertension     PONV (postoperative nausea and vomiting)     Sleep apnea     Wears partial dentures     lower        PSHx:   Past Surgical History:   Procedure Laterality Date    CHOLECYSTECTOMY N/A 4/26/2024    " Procedure: LAPAROSCOPIC CHOLECYSTECTOMY WITH CHOLANGIOGRAM;  Surgeon: Jeff Lira MD;  Location: [facility] OR;  Service: General;  Laterality: N/A;    [place] CHOLANGIOGRAM INTRAOPERATIVE  2024    [place] CHOLANGIOGRAM INTRAOPERATIVE 2024 [facility] MIS FLUOROSCOPY    HIP ARTHROPLASTY Left     JOINT REPLACEMENT      REPLACEMENT TOTAL KNEE BILATERAL Bilateral     SHOULDER SURGERY  2015    RCR    TENDON REPAIR Left 2018    Procedure: TENDON TRANSFER OF EXTENSOR INDICIS TO EXTENSOR POLLICIS LONGUS OF LEFT THUMB;  Surgeon: Nabil Wolfe DO;  Location: [facility] OR;  Service: Orthopedics;  Laterality: Left;    TOTAL HIP ARTHROPLASTY Left 2017    With Dr. Strong    TOTAL HIP ARTHROPLASTY  2017    WRIST SURGERY         FamHx:   Family History   Problem Relation Age of Onset    Other Mother         Malignant tumor of breast    Arthritis Father     Arthritis Sister     Arthritis Brother     Arthritis Maternal Grandmother     Stroke Paternal Grandmother     Stroke Paternal Grandfather        SocHx:   Social History     Socioeconomic History    Marital status: Single     Spouse name: Not on file    Number of children: Not on file    Years of education: Not on file    Highest education level: Not on file   Occupational History    Not on file   Tobacco Use    Smoking status: Former     Current packs/day: 0.00     Types: Cigarettes     Quit date:      Years since quittin.7     Passive exposure: Past    Smokeless tobacco: Never   Vaping Use    Vaping status: Never Used   Substance and Sexual Activity    Alcohol use: Yes     Alcohol/week: 2.0 standard drinks of alcohol     Types: 2 Shots of liquor per week    Drug use: No    Sexual activity: Defer   Other Topics Concern    Not on file   Social History Narrative    Not on file     Social Determinants of Health     Financial Resource Strain: Not on file   Food Insecurity: Not on file   Transportation Needs: Not on file   Physical Activity: Not on file   Stress: Not  on file (2/10/2021)   Social Connections: Not on file   Intimate Partner Violence: Low Risk  (4/12/2021)    Received from [facility]    Intimate Partner Violence     Insults You: Not on file     Threatens You: Not on file     Screams at You: Not on file     Physically Hurt: Not on file     Intimate Partner Violence Score: Not on file   Housing Stability: Not on file       Medications:   Current Outpatient Medications   Medication Sig Dispense Refill    metoprolol succinate XL (TOPROL-XL) 25 mg 24 hr tablet Take 1 tablet (25 mg total) by mouth daily 90 tablet 3    ramipriL (ALTACE) 2.5 mg capsule Take 1 capsule (2.5 mg total) by mouth daily 90 capsule 3    atorvastatin (LIPITOR) 20 mg tablet Take 1 tablet (20 mg total) by mouth daily 90 tablet 1    aspirin 81 mg EC tablet daily.      amoxicillin (AMOXIL) 500 mg capsule Take 2 capsules (1,000 mg total) by mouth 2 (two) times a day       No current facility-administered medications for this visit.         OBJECTIVE:   Vitals: Pulse 77   Wt 93 kg (205 lb)   SpO2 96%   BMI 33.09 kg/m²      ESS: [place] Sleepiness Scale (ESS) completed in the office today with a total score of 5/24.    Positive Airway Pressure Therapy Compliance Report clinic visit 09/16/24: The patient has demonstrated from a 30 day CPAP therapy compliance report from 8/14-9/12/2024:   % usage over 4 hours: 90%  AHI: 1.9/h  10 cm H2O, EPR 3 full-time  95% leak index of 9.7 L/m    General: NAD; A&Ox3.  HEENT: Mallampati class 4 airway; no noted retro or micrognathia.  Pulmonary: No wheezing or crackles noted upon ausculation bilaterally.  Cardiovascular: Bilateral LEs without noted edema; RRR, no noted murmurs upon auscultation; radial pulses equal and palpable bilaterally.  Neurologic:  strength equal bilaterally; gait appears normal; PERRL, EOM intact.  Psychiatric: Normal behavior and mood.    ASSESSMENT:   Diagnoses and all orders for this visit:    Severe obstructive sleep apnea  -      Home CPAP    Family history of sleep apnea             PLAN:  Continue CPAP therapy. The patient is currently compliant, benefiting (given the therapeutic AHI, and symptomatically) from the prescribed therapy.    Prescription update was sent to DME provider.    Education provided regarding: risks of obstructive sleep apnea, risks of driving drowsy/tired.     Follow-up with Primary Care Provider for general medical needs. Keep all scheduled medical appointments.    Follow-up at the Neurology Clinic in 1 year for Sleep Health, sooner as needed.      The patient is in agreement with above plan, verbalizes understanding, no further questions or concerns.  The patient has been advised to contact this clinic or the answering service with questions or concerns that may arise from today's visit.      A total of 22 minutes was required for this clinic visit.    A voice recognition program was used to aid in documentation of the record. Sometimes words are not printed exactly as they were spoken. While efforts were made to carefully edit and correct any inaccuracies, some may be present; please take these into context. Please contact the provider if errors are identified.         Juan Chavez PA-C

## 2024-09-16 ENCOUNTER — OFFICE VISIT (OUTPATIENT)
Dept: NEUROLOGY | Facility: CLINIC | Age: 74
End: 2024-09-16
Payer: MEDICARE

## 2024-09-16 VITALS — BODY MASS INDEX: 33.09 KG/M2 | WEIGHT: 205 LBS | OXYGEN SATURATION: 96 % | HEART RATE: 77 BPM

## 2024-09-16 DIAGNOSIS — Z82.0 FAMILY HISTORY OF SLEEP APNEA: ICD-10-CM

## 2024-09-16 DIAGNOSIS — G47.33 SEVERE OBSTRUCTIVE SLEEP APNEA: Primary | ICD-10-CM

## 2024-09-16 PROCEDURE — G0463 HOSPITAL OUTPT CLINIC VISIT: HCPCS | Performed by: PHYSICIAN ASSISTANT

## 2024-09-16 PROCEDURE — 99213 OFFICE O/P EST LOW 20 MIN: CPT | Performed by: PHYSICIAN ASSISTANT

## 2024-09-16 NOTE — PATIENT INSTRUCTIONS
You have been diagnosed with sleep apnea or another sleep disorder which may adversely affect your driving. People with sleep apnea or other sleep disorders may have a threefold increased rate of automobile crashes or other accidents. These accidents may cause serious injury or death to yourself or others. If you have had an automobile crash or frequent near-crashes due to sleepiness or inattention, you should stop driving and operating dangerous machinery until your sleep disorder has been treated and you no longer become sleepy or inattentive while driving. It is your responsibility not to drive if you are sleepy or inattentive while driving. Until your treatment has begun, have your spouse or friends drive for you. If you drive against medical advice, plan ahead so that you avoid driving at times of day when you are most likely to be sleepy, such as mid-afternoon, late at night, or after a poor night's sleep. If you have further concerns about your driving, talk to your doctor.        Living With Sleep Apnea  Sleep apnea is a condition in which breathing pauses or becomes shallow during sleep. Sleep apnea is most commonly caused by a collapsed or blocked airway. People with sleep apnea snore loudly and have times when they gasp and stop breathing for 10 seconds or more during sleep. This happens over and over during the night. This disrupts your sleep and keeps your body from getting the rest that it needs, which can cause tiredness and lack of energy (fatigue) during the day.  The breaks in breathing also interrupt the deep sleep that you need to feel rested. Even if you do not completely wake up from the gaps in breathing, your sleep may not be restful. You may also have a headache in the morning and low energy during the day, and you may feel anxious or depressed.  How can sleep apnea affect me?  Sleep apnea increases your chances of extreme tiredness during the day (daytime fatigue). It can also increase your  risk for health conditions, such as:  Heart attack.  Stroke.  Diabetes.  Heart failure.  Irregular heartbeat.  High blood pressure.  If you have daytime fatigue as a result of sleep apnea, you may be more likely to:  Perform poorly at school or work.  Fall asleep while driving.  Have difficulty with attention.  Develop depression or anxiety.  Become severely overweight (obese).  Have sexual dysfunction.  What actions can I take to manage sleep apnea?  Sleep apnea treatment  If you were given a device to open your airway while you sleep, use it only as told by your health care provider. You may be given:  An oral appliance. This is a custom-made mouthpiece that shifts your lower jaw forward.  A continuous positive airway pressure (CPAP) device. This device blows air through a mask when you breathe out (exhale).  A nasal expiratory positive airway pressure (EPAP) device. This device has valves that you put into each nostril.  A bi-level positive airway pressure (BPAP) device. This device blows air through a mask when you breathe in (inhale) and breathe out (exhale).  You may need surgery if other treatments do not work for you.       Sleep habits  Go to sleep and wake up at the same time every day. This helps set your internal clock (circadian rhythm) for sleeping.  If you stay up later than usual, such as on weekends, try to get up in the morning within 2 hours of your normal wake time.  Try to get at least 7-9 hours of sleep each night.  Stop computer, tablet, and mobile phone use a few hours before bedtime.  Do not take long naps during the day. If you nap, limit it to 30 minutes.  Have a relaxing bedtime routine. Reading or listening to music may relax you and help you sleep.  Use your bedroom only for sleep.  Keep your television and computer out of your bedroom.  Keep your bedroom cool, dark, and quiet.  Use a supportive mattress and pillows.  Follow your health care provider's instructions for other changes to  sleep habits.  Nutrition  Do not eat heavy meals in the evening.  Do not have caffeine in the later part of the day. The effects of caffeine can last for more than 5 hours.  Follow your health care provider's or dietitian's instructions for any diet changes.  Lifestyle  Do not drink alcohol before bedtime. Alcohol can cause you to fall asleep at first, but then it can cause you to wake up in the middle of the night and have trouble getting back to sleep.  Do not use any products that contain nicotine or tobacco, such as cigarettes and e-cigarettes. If you need help quitting, ask your health care provider.               Medicines  Take over-the-counter and prescription medicines only as told by your health care provider.  Do not use over-the-counter sleep medicine. You can become dependent on this medicine, and it can make sleep apnea worse.  Do not use medicines, such as sedatives and narcotics, unless told by your health care provider.  Activity  Exercise on most days, but avoid exercising in the evening. Exercising near bedtime can interfere with sleeping.  If possible, spend time outside every day. Natural light helps regulate your circadian rhythm.  General information  Lose weight if you need to, and maintain a healthy weight.  Keep all follow-up visits as told by your health care provider. This is important.  If you are having surgery, make sure to tell your health care provider that you have sleep apnea. You may need to bring your device with you.  Where to find more information  Learn more about sleep apnea and daytime fatigue from:  American Sleep Association: sleepassociation.org  National Sleep Foundation: sleepfoundation.org  [employer]: nhlbi.nih.gov  Summary  Sleep apnea can cause daytime fatigue and other serious health conditions.  Both sleep apnea and daytime fatigue can be bad for your health and well-being.  You may need to wear a device while sleeping to help keep  your airway open.  If you are having surgery, make sure to tell your health care provider that you have sleep apnea. You may need to bring your device with you.  Making changes to sleep habits, diet, lifestyle, and activity can help you manage sleep apnea.  This information is not intended to replace advice given to you by your health care provider. Make sure you discuss any questions you have with your health care provider.       Insomnia  Insomnia is a sleep disorder that makes it difficult to fall asleep or stay asleep. Insomnia can cause fatigue, low energy, difficulty concentrating, mood swings, and poor performance at work or school.  There are three different ways to classify insomnia:  Difficulty falling asleep.  Difficulty staying asleep.  Waking up too early in the morning.  Any type of insomnia can be long-term (chronic) or short-term (acute). Both are common. Short-term insomnia usually lasts for three months or less. Chronic insomnia occurs at least three times a week for longer than three months.  What are the causes?  Insomnia may be caused by another condition, situation, or substance, such as:  Anxiety.  Certain medicines.  Gastroesophageal reflux disease (GERD) or other gastrointestinal conditions.  Asthma or other breathing conditions.  Restless legs syndrome, sleep apnea, or other sleep disorders.  Chronic pain.  Menopause.  Stroke.  Abuse of alcohol, tobacco, or illegal drugs.  Mental health conditions, such as depression.  Caffeine.  Neurological disorders, such as Alzheimer's disease.  An overactive thyroid (hyperthyroidism).  Sometimes, the cause of insomnia may not be known.  What increases the risk?  Risk factors for insomnia include:  Gender. Women are affected more often than men.  Age. Insomnia is more common as you get older.  Stress.  Lack of exercise.  Irregular work schedule or working night shifts.  Traveling between different time zones.  Certain medical and mental health  conditions.  What are the signs or symptoms?  If you have insomnia, the main symptom is having trouble falling asleep or having trouble staying asleep. This may lead to other symptoms, such as:  Feeling fatigued or having low energy.  Feeling nervous about going to sleep.  Not feeling rested in the morning.  Having trouble concentrating.  Feeling irritable, anxious, or depressed.  How is this diagnosed?  This condition may be diagnosed based on:  Your symptoms and medical history. Your health care provider may ask about:  Your sleep habits.  Any medical conditions you have.  Your mental health.  A physical exam.  How is this treated?  Treatment for insomnia depends on the cause. Treatment may focus on treating an underlying condition that is causing insomnia. Treatment may also include:  Medicines to help you sleep.  Counseling or therapy.  Lifestyle adjustments to help you sleep better.  Follow these instructions at home:       Eating and drinking  Limit or avoid alcohol, caffeinated beverages, and cigarettes, especially close to bedtime. These can disrupt your sleep.  Do not eat a large meal or eat spicy foods right before bedtime. This can lead to digestive discomfort that can make it hard for you to sleep.       Sleep habits  Keep a sleep diary to help you and your health care provider figure out what could be causing your insomnia. Write down:  When you sleep.  When you wake up during the night.  How well you sleep.  How rested you feel the next day.  Any side effects of medicines you are taking.  What you eat and drink.  Make your bedroom a dark, comfortable place where it is easy to fall asleep.  Put up shades or blackout curtains to block light from outside.  Use a white noise machine to block noise.  Keep the temperature cool.  Limit screen use before bedtime. This includes:  Watching TV.  Using your smartphone, tablet, or computer.  Stick to a routine that includes going to bed and waking up at the same  times every day and night. This can help you fall asleep faster. Consider making a quiet activity, such as reading, part of your nighttime routine.  Try to avoid taking naps during the day so that you sleep better at night.  Get out of bed if you are still awake after 15 minutes of trying to sleep. Keep the lights down, but try reading or doing a quiet activity. When you feel sleepy, go back to bed.       General instructions  Take over-the-counter and prescription medicines only as told by your health care provider.  Exercise regularly, as told by your health care provider. Avoid exercise starting several hours before bedtime.  Use relaxation techniques to manage stress. Ask your health care provider to suggest some techniques that may work well for you. These may include:  Breathing exercises.  Routines to release muscle tension.  Visualizing peaceful scenes.  Make sure that you drive carefully. Avoid driving if you feel very sleepy.  Keep all follow-up visits as told by your health care provider. This is important.  Contact a health care provider if:  You are tired throughout the day.  You have trouble in your daily routine due to sleepiness.  You continue to have sleep problems, or your sleep problems get worse.  Get help right away if:  You have serious thoughts about hurting yourself or someone else.  If you ever feel like you may hurt yourself or others, or have thoughts about taking your own life, get help right away. You can go to your nearest emergency department or call:  Your local emergency services (911 in the [place]  A suicide crisis helpline, such as the National Suicide Prevention Lifeline at 1-769.945.8568. This is open 24 hours a day.  Summary  Insomnia is a sleep disorder that makes it difficult to fall asleep or stay asleep.  Insomnia can be long-term (chronic) or short-term (acute).  Treatment for insomnia depends on the cause. Treatment may focus on treating an underlying condition that is  causing insomnia.  Keep a sleep diary to help you and your health care provider figure out what could be causing your insomnia.  This information is not intended to replace advice given to you by your health care provider. Make sure you discuss any questions you have with your health care provider.

## 2024-10-10 DIAGNOSIS — I25.10 CORONARY ARTERY DISEASE INVOLVING NATIVE CORONARY ARTERY OF NATIVE HEART WITHOUT ANGINA PECTORIS: ICD-10-CM

## 2024-10-11 NOTE — TELEPHONE ENCOUNTER
Care Due:                  Date            Visit Type   Department     Provider  --------------------------------------------------------------------------------                              ESTABLISHED   [facility]  Last Visit: 08-      PATIENT      MEDICINE       JULIANNE ARORA  Next Visit: None Scheduled  None         None Found                                                            Last  Test          Frequency    Reason                     Performed    Due Date  --------------------------------------------------------------------------------  Office Visit  12 months..  atorvastatin, metoprolol,   05- 04-                             ramipriL.................    Lipid Panel.  12 months..  atorvastatin.............  Not Found    Overdue    [employer] Embedded Care Due Messages. Reference number: [id number]. 10/10/2024 8:19:08 PM ALANNA

## 2024-10-14 RX ORDER — ATORVASTATIN CALCIUM 20 MG/1
20 TABLET, FILM COATED ORAL DAILY
Qty: 90 TABLET | Refills: 1 | Status: SHIPPED | OUTPATIENT
Start: 2024-10-14

## 2025-01-23 ENCOUNTER — TELEPHONE (OUTPATIENT)
Dept: FAMILY MEDICINE | Facility: CLINIC | Age: 75
End: 2025-01-23

## 2025-01-23 DIAGNOSIS — E78.5 HYPERLIPIDEMIA, UNSPECIFIED HYPERLIPIDEMIA TYPE: ICD-10-CM

## 2025-01-23 DIAGNOSIS — I25.10 ATHEROSCLEROSIS OF NATIVE CORONARY ARTERY OF NATIVE HEART WITHOUT ANGINA PECTORIS: Primary | ICD-10-CM

## 2025-01-23 RX ORDER — ICOSAPENT ETHYL 1 G/1
2 CAPSULE ORAL 2 TIMES DAILY WITH MEALS
Qty: 120 CAPSULE | Refills: 1 | Status: SHIPPED | OUTPATIENT
Start: 2025-01-23 | End: 2025-04-23

## 2025-01-23 NOTE — TELEPHONE ENCOUNTER
I have no problem with prescribing this.  Usual dose is 2 g twice daily with meals.  Could do #180 with 4 refills.  Thanks

## 2025-01-23 NOTE — TELEPHONE ENCOUNTER
Pt states there was a medication that a cardiologist in [place] prescribed about 2 years go. He is supposed to be taking 4 a day, two in the morning and two at night. It is Icosapent Ethyl capsules 1 gram. Pt wanting to know if Dr. Seymour will prescribe this or if he'll need to see a cardiologist to get it re-prescribed. Pt is also in [place] until April. He will need a refill until then. Please call to discuss.

## 2025-04-08 ENCOUNTER — TELEPHONE (OUTPATIENT)
Dept: FAMILY MEDICINE | Facility: CLINIC | Age: 75
End: 2025-04-08
Payer: MEDICARE

## 2025-04-08 DIAGNOSIS — I25.10 ATHEROSCLEROSIS OF NATIVE CORONARY ARTERY OF NATIVE HEART WITHOUT ANGINA PECTORIS: Primary | ICD-10-CM

## 2025-04-08 DIAGNOSIS — Z00.00 ENCOUNTER FOR MEDICARE ANNUAL WELLNESS EXAM: ICD-10-CM

## 2025-04-08 DIAGNOSIS — D69.6 THROMBOCYTOPENIA (CMS/HCC): ICD-10-CM

## 2025-04-08 DIAGNOSIS — Z13.1 SCREENING FOR DIABETES MELLITUS: ICD-10-CM

## 2025-04-08 NOTE — TELEPHONE ENCOUNTER
Pt states his Icosapent 1 gram is not on his insurance list and he'd like that added. Please call to discuss how he can get this added.

## 2025-04-08 NOTE — TELEPHONE ENCOUNTER
Екатерина Martinez have his prescription for the his Icosapent 1 gram 2 tables 2 times daily.  He would like it sent to [facility] mail order.  He has only been taking the tablets once daily instead of twice and this is why they have last longer.  He will take them correctly if he can get a new prescription. Last appointment with Dr Rawls 5/1/2023.  Appt with Dr Carbone for same day issues.  He stated he would make a physical appointment.

## 2025-04-08 NOTE — TELEPHONE ENCOUNTER
Pt made an appt for a [facility] on 5/26 with Dr. Seymour and he wants to make sure that an A1C lab is ordered.

## 2025-04-09 DIAGNOSIS — I25.10 ATHEROSCLEROSIS OF NATIVE CORONARY ARTERY OF NATIVE HEART WITHOUT ANGINA PECTORIS: ICD-10-CM

## 2025-04-09 DIAGNOSIS — E78.5 HYPERLIPIDEMIA, UNSPECIFIED HYPERLIPIDEMIA TYPE: ICD-10-CM

## 2025-04-09 RX ORDER — ICOSAPENT ETHYL 1 G/1
2 CAPSULE ORAL 2 TIMES DAILY WITH MEALS
Qty: 360 CAPSULE | Refills: 4 | Status: SHIPPED | OUTPATIENT
Start: 2025-04-09 | End: 2025-07-08

## 2025-05-20 DIAGNOSIS — Z11.59 NEED FOR HEPATITIS C SCREENING TEST: Primary | ICD-10-CM

## 2025-05-20 NOTE — PROGRESS NOTES
Subjective     Patient Care Team:  Wisam Seymour MD as PCP - General (Family Medicine)    Juan Corona is a 74 y.o. male who presents for an annual Medicare Wellness visit      Patient presents for an annual Medicare Wellness Exam. Discussed results of labs drawn on 5/22/25 in detail with verbalized understanding from patient.    Patient has a hx of atherosclerotic heart disease. He is currently taking Vascepa 2g twice daily, atorvastatin 20mg daily, metoprolol succinate XL 25mg daily, ramipril 2.5mg, and 81mg aspirin daily. Patient denies headaches, chest pain, shortness of breath, PND, blurred vision, palpitations, worse edema, or dizziness.    Patient has a hx of thrombocytopenia.     Patient has a hx of BPH associated with nocturia.     Patient's last colonoscopy was completed on 9/30/24 with 5 year repeat suggested. He is established with the Endoscopy center.    Patient has a hx of severe obstructive sleep apnea and uses a CPAP nightly. Sleep is well improved with CPAP use.     Patient is a former smoker. He denies chewing tobacco use. He receives annual flu vaccinations. He has received 1 dose of Shingrix.         History of Present Illness  He has been experiencing left anterior hip pain for the past eight months, which has progressively worsened. The pain is described as stabbing, particularly during hip flexion, and severely limits his mobility, including his ability to climb stairs. He has not attempted any treatments for this pain recently.    His medical history includes a left hip replacement around 2017 or 2018, following a femur fracture that required plate placement. Since the hip replacement, he has had difficulty lifting his leg. Previous interventions such as physical therapy and injections have not alleviated his symptoms. The hip pain also affects his ability to perform daily activities like dressing.    He also experiences lower back pain. No numbness or tingling in the left leg.    His  lipid panel is normal except for a low HDL at 33. He has received both doses of the Shingrix vaccine and the pneumonia vaccine but is unsure about his last tetanus shot, which he believes was over ten years ago.        Comprehensive Medical and Social History  Patient Active Problem List   Diagnosis    Atherosclerotic heart disease of native coronary artery without angina pectoris    Thrombocytopenia (CMS/HCC)    BPH associated with nocturia    Severe obstructive sleep apnea    Family history of sleep apnea    Calculus of gallbladder with acute on chronic cholecystitis without obstruction     Past Medical History:   Diagnosis Date    Arthritis     Coronary artery disease     with stent     Deep vein thrombosis (CMS/HCC)     in left leg     GERD (gastroesophageal reflux disease)     High cholesterol     History of transfusion     Hypertension     PONV (postoperative nausea and vomiting)     Sleep apnea     Wears partial dentures     lower      Past Surgical History:   Procedure Laterality Date    CHOLECYSTECTOMY N/A 4/26/2024    Procedure: LAPAROSCOPIC CHOLECYSTECTOMY WITH CHOLANGIOGRAM;  Surgeon: Jeff Lira MD;  Location: [facility] OR;  Service: General;  Laterality: N/A;    FL CHOLANGIOGRAM INTRAOPERATIVE  4/26/2024    FL CHOLANGIOGRAM INTRAOPERATIVE 4/26/2024 [facility] MIS FLUOROSCOPY    HIP ARTHROPLASTY Left     JOINT REPLACEMENT      REPLACEMENT TOTAL KNEE BILATERAL Bilateral     SHOULDER SURGERY  01/01/2015    RCR    TENDON REPAIR Left 1/5/2018    Procedure: TENDON TRANSFER OF EXTENSOR INDICIS TO EXTENSOR POLLICIS LONGUS OF LEFT THUMB;  Surgeon: Nabil Wolfe DO;  Location: [facility] OR;  Service: Orthopedics;  Laterality: Left;    TOTAL HIP ARTHROPLASTY Left 03/21/2017    With Dr. Strong    TOTAL HIP ARTHROPLASTY  03/21/2017    WRIST SURGERY       No Known Allergies  Current Outpatient Medications   Medication Sig Dispense Refill    icosapent ethyL (Vascepa) 1 gram capsule capsule Take 2 capsules (2 g total) by mouth 2  (two) times a day with meals 360 capsule 4    aspirin 81 mg EC tablet daily.      ramipriL (ALTACE) 2.5 mg capsule Take 1 capsule (2.5 mg total) by mouth daily 90 capsule 4    metoprolol succinate XL (TOPROL-XL) 25 mg 24 hr tablet Take 1 tablet (25 mg total) by mouth daily 90 tablet 4    atorvastatin (LIPITOR) 20 mg tablet Take 1 tablet (20 mg total) by mouth daily 90 tablet 4     No current facility-administered medications for this visit.     Social History     Socioeconomic History    Marital status: Single   Tobacco Use    Smoking status: Former     Current packs/day: 0.00     Types: Cigarettes     Quit date:      Years since quittin.4     Passive exposure: Past    Smokeless tobacco: Never   Vaping Use    Vaping status: Never Used   Substance and Sexual Activity    Alcohol use: Yes     Alcohol/week: 2.0 standard drinks of alcohol     Types: 2 Shots of liquor per week    Drug use: No    Sexual activity: Defer     Social Drivers of Health     Tobacco Use: Medium Risk (2025)    Patient History     Smoking Tobacco Use: Former     Smokeless Tobacco Use: Never     Passive Exposure: Past   Alcohol Use: Not At Risk (2025)    AUDIT-C     Frequency of Alcohol Consumption: 2-3 times a week     Average Number of Drinks: 1 or 2     Frequency of Binge Drinking: Never   Financial Resource Strain: Patient Declined (2025)    Overall Financial Resource Strain (CARDIA)     Difficulty of Paying Living Expenses: Patient declined   Food Insecurity: Patient Declined (2025)    Hunger Vital Sign     Worried About Running Out of Food in the Last Year: Patient declined     Ran Out of Food in the Last Year: Patient declined   Transportation Needs: No Transportation Needs (2025)    PRAPARE - Transportation     Lack of Transportation (Medical): No     Lack of Transportation (Non-Medical): No   Physical Activity: Insufficiently Active (2025)    Exercise Vital Sign     Days of Exercise per Week: 2 days     " Minutes of Exercise per Session: 20 min   Stress: Patient Declined (5/27/2025)    [employer] - Occupational Stress Questionnaire     Feeling of Stress : Patient declined   Social Connections: Unknown (5/27/2025)    Social Connection and Isolation Panel [NHANES]     Frequency of Communication with Friends and Family: Patient declined     Frequency of Social Gatherings with Friends and Family: Once a week     Attends Worship Services: 1 to 4 times per year     Active Member of Clubs or Organizations: No     Attends Club or Organization Meetings: Never     Marital Status: Living with partner   Depression: None or minimal depression (5/27/2025)    PHQ-9     PHQ-9 Score: 0   Housing Stability: Low Risk  (5/27/2025)    Housing Stability Vital Sign     Unable to Pay for Housing in the Last Year: No     Number of Times Moved in the Last Year: 1     Homeless in the Last Year: No   Utilities: Not At Risk (5/27/2025)    [facility] Utilities     Threatened with loss of utilities: No       Activities of Daily Living  In your present state of health, do you have any difficulty performing the following activities?:   Preparing food and eating?: No  Bathing yourself: No  Getting dressed: No  Using the toilet:No  Moving around from place to place: No  Housekeeping: No  Shopping: No  Managing finances: No  Managing medications: No   In the past year have you fallen or had a near fall?:No    Depression Screen  (Note: if answer to either of the following is \"Yes\", a more complete depression screening is indicated)   Q1: Over the past two weeks, have you felt down, depressed or hopeless? no  Q2: Over the past two weeks, have you felt little interest or pleasure in doing things? no     Current exercise habits: Home exercise routine includes walking.   Dietary issues discussed:  No  Hearing difficulties/changes: No  Vision problems/changes: No  Safe in current home environment: yes  Falls Risk Assessment: no " frequent falls while walking, no fall since last visit, and no fall(s) in the past year  Functional Ability/Safety Screen    1. Was the patient's timed Up & Go test unsteady or longer than 30 seconds?  No      2. Does your home have rugs in the hallway, lack grab bars in the bathroom, lack handrails on the stairs, or have poor lighting? No      3.  Have you noticed any hearing difficulties? No     The following have been reviewed and updated as appropriate in this visit:   Tobacco  Allergies  Meds  Problems  Med Hx  Surg Hx  Fam Hx         Review of Systems  Review of Systems    Objective     Blood pressure 136/78, pulse 67, temperature 36.4 °C (97.6 °F), temperature source Temporal, resp. rate 16, weight 90.6 kg (199 lb 12.8 oz), SpO2 98%.  Body mass index is 32.25 kg/m².  Timed Up & Go: 5 seconds  Physical Exam  Vitals and nursing note reviewed.   Constitutional:       General: He is not in acute distress.     Appearance: Normal appearance. He is well-developed. He is not ill-appearing, toxic-appearing or diaphoretic.   HENT:      Head: Normocephalic and atraumatic.      Right Ear: Tympanic membrane, ear canal and external ear normal. There is no impacted cerumen.      Left Ear: Tympanic membrane, ear canal and external ear normal. There is no impacted cerumen.      Nose: Nose normal. No congestion or rhinorrhea.      Mouth/Throat:      Mouth: Mucous membranes are moist.      Pharynx: Oropharynx is clear. No oropharyngeal exudate or posterior oropharyngeal erythema.   Eyes:      General: No scleral icterus.        Right eye: No discharge.         Left eye: No discharge.      Conjunctiva/sclera: Conjunctivae normal.   Neck:      Thyroid: No thyroid mass, thyromegaly or thyroid tenderness.      Vascular: No carotid bruit.      Trachea: No tracheal deviation.   Cardiovascular:      Rate and Rhythm: Normal rate and regular rhythm.      Pulses: Normal pulses.      Heart sounds: Normal heart sounds. No murmur  heard.     No friction rub. No gallop.   Pulmonary:      Effort: Pulmonary effort is normal. No respiratory distress.      Breath sounds: Normal breath sounds. No stridor. No wheezing, rhonchi or rales.   Chest:      Chest wall: No tenderness.   Musculoskeletal:      Cervical back: Neck supple.      Right lower leg: No edema.      Left lower leg: No edema.      Comments: Pain with hip flexion.   Lymphadenopathy:      Cervical: No cervical adenopathy.   Skin:     General: Skin is warm and dry.      Capillary Refill: Capillary refill takes less than 2 seconds.      Coloration: Skin is not pale.      Findings: No erythema or rash.   Neurological:      Mental Status: He is alert.      Gait: Gait normal.      Deep Tendon Reflexes: Reflexes are normal and symmetric.   Psychiatric:         Mood and Affect: Mood normal.         Assessment/Plan     Diagnoses and all orders for this visit:    Medicare annual wellness visit, subsequent    Coronary artery disease involving native coronary artery of native heart without angina pectoris  -     ramipriL (ALTACE) 2.5 mg capsule; Take 1 capsule (2.5 mg total) by mouth daily  -     metoprolol succinate XL (TOPROL-XL) 25 mg 24 hr tablet; Take 1 tablet (25 mg total) by mouth daily  -     atorvastatin (LIPITOR) 20 mg tablet; Take 1 tablet (20 mg total) by mouth daily    Thrombocytopenia (CMS/HCC)    BPH associated with nocturia    Severe obstructive sleep apnea    Colon cancer screening    Chronic left hip pain  -     X-ray hip 2 or 3 views left    Atherosclerosis of native coronary artery of native heart without angina pectoris      Assessment & Plan  Left hip pain  Chronic left anterior hip pain for 8 months, worsening, with history of hip replacement in 2018. Differential includes hip flexor issues or tendon/ligament inflammation. Previous interventions ineffective. Significant atrophy in left leg.  - Order x-ray of left hip to assess hardware status.  - Consider referral to  orthopedic surgeon if x-ray unremarkable.  - Discuss potential sports medicine consultation for further management.    Borderline elevated hemoglobin A1c  Hemoglobin A1c at 5.9%, upper limit of normal. Actively managing with diet and exercise.  - Continue current diet and exercise regimen to improve blood sugar control.    General Health Maintenance  Colorectal cancer screening up to date. Vaccinations include Shingrix and pneumonia. Tetanus vaccine overdue.  - Recommend tetanus booster, available at pharmacy.        During the course of the visit the patient was educated and counseled about appropriate screening and preventive services including:   Pneumococcal vaccine:  Reviewed and discussed with patient  Shingles vaccine:  Reviewed and discussed with patient  Hepatitis B vaccine:  Reviewed and discussed with patient  Smoking cessation counseling  Screening electrocardiogram discussed  Cholesterol screening discussed  Diabetes screening  Glaucoma screening  Nutrition counseling   Advanced directives: has an advanced directive - a copy HAS NOT been provided.    Written screening schedule individualized for the patient as above based on current USPSTF, age-appropriate medicare services and ACIP as described above was given.    Mental health conditions and/or risk factors are as listed (if any) in the ROS above as specific to this patient.  Direct cognitive impairment was assessed.  Throughout the interview there were no psychosocial or behavioral risks noted.    Patient's lifestyle was evaluated to promote wellness in terms of but not limited to: fall prevention, nutrition, physical activity, tobacco/alcohol if present and weight management.  These were addressed specifically with this patient as listed within the social history, discussion notes, and above.      Wisam Seymour MD    Portions of this note were documented by Dolores Deleon as I performed the exam and collected the information from Juan MCKINLEY  Cj. I attest that I have reviewed the information as documented, verified the accuracy of the documentation and added additional information as needed.

## 2025-05-22 ENCOUNTER — RESULTS FOLLOW-UP (OUTPATIENT)
Dept: FAMILY MEDICINE | Facility: CLINIC | Age: 75
End: 2025-05-22

## 2025-05-22 ENCOUNTER — LAB (OUTPATIENT)
Dept: LAB | Facility: CLINIC | Age: 75
End: 2025-05-22
Payer: MEDICARE

## 2025-05-22 DIAGNOSIS — Z00.00 ENCOUNTER FOR MEDICARE ANNUAL WELLNESS EXAM: ICD-10-CM

## 2025-05-22 DIAGNOSIS — Z13.1 SCREENING FOR DIABETES MELLITUS: ICD-10-CM

## 2025-05-22 DIAGNOSIS — Z11.59 NEED FOR HEPATITIS C SCREENING TEST: ICD-10-CM

## 2025-05-22 DIAGNOSIS — I25.10 ATHEROSCLEROSIS OF NATIVE CORONARY ARTERY OF NATIVE HEART WITHOUT ANGINA PECTORIS: ICD-10-CM

## 2025-05-22 DIAGNOSIS — D69.6 THROMBOCYTOPENIA (CMS/HCC): ICD-10-CM

## 2025-05-22 LAB
ALBUMIN SERPL-MCNC: 4.7 G/DL (ref 3.5–5.3)
ALP SERPL-CCNC: 74 U/L (ref 45–115)
ALT SERPL-CCNC: 44 U/L (ref 7–52)
ANION GAP SERPL CALC-SCNC: 8 MMOL/L (ref 3–11)
AST SERPL-CCNC: 36 U/L
BASOPHILS # BLD AUTO: 0.03 10*3/UL
BASOPHILS NFR BLD AUTO: 0.5 % (ref 0–2)
BILIRUB SERPL-MCNC: 1.7 MG/DL (ref 0.2–1.4)
BUN SERPL-MCNC: 17 MG/DL (ref 7–25)
CALCIUM ALBUM COR SERPL-MCNC: 9.9 MG/DL (ref 8.6–10.3)
CALCIUM SERPL-MCNC: 10.5 MG/DL (ref 8.6–10.3)
CHLORIDE SERPL-SCNC: 108 MMOL/L (ref 98–107)
CHOLEST SERPL-MCNC: 114 MG/DL (ref 0–199)
CO2 SERPL-SCNC: 24 MMOL/L (ref 21–32)
CREAT SERPL-MCNC: 0.64 MG/DL (ref 0.7–1.3)
EGFRCR SERPLBLD CKD-EPI 2021: 100 ML/MIN/1.73M*2
EOSINOPHIL # BLD AUTO: 0.17 10*3/UL
EOSINOPHIL NFR BLD AUTO: 2.9 % (ref 0–3)
ERYTHROCYTE DISTRIBUTION WIDTH STANDARD DEVIATION: 45.1 FL (ref 35.1–43.9)
ERYTHROCYTE [DISTWIDTH] IN BLOOD BY AUTOMATED COUNT: 13.4 % (ref 11.5–15)
EST. AVERAGE GLUCOSE BLD GHB EST-MCNC: 122.6 MG/DL
FASTING STATUS PATIENT QL REPORTED: YES
GLUCOSE SERPL-MCNC: 104 MG/DL (ref 70–105)
HBA1C MFR BLD: 5.9 % (ref 4–6)
HCT VFR BLD AUTO: 47.1 % (ref 38–50)
HCV AB SER QL: NORMAL
HDLC SERPL-MCNC: 33 MG/DL
HGB BLD-MCNC: 15.6 G/DL (ref 13.2–17.2)
IMMATURE GRANULOCYTES (10*3/UL) LEUKOCYTES IN BLOOD BY AUTOMATED COUNT: <0.03 10*3/UL
IMMATURE GRANULOCYTES/100 LEUKOCYTES IN BLOOD BY AUTOMATED COUNT: 0.3 %
LDLC SERPL CALC-MCNC: 52 MG/DL (ref 20–99)
LYMPHOCYTES # BLD AUTO: 2.01 10*3/UL
LYMPHOCYTES NFR BLD AUTO: 34.1 % (ref 15–47)
MCH RBC QN AUTO: 30.2 PG (ref 29–34)
MCHC RBC AUTO-ENTMCNC: 33.1 G/DL (ref 32–36)
MCV RBC AUTO: 91.3 FL (ref 82–97)
MONOCYTES # BLD AUTO: 0.63 10*3/UL
MONOCYTES NFR BLD AUTO: 10.7 % (ref 5–13)
NEUTROPHILS # BLD AUTO: 3.04 10*3/UL
NEUTROPHILS NFR BLD AUTO: 51.5 % (ref 46–70)
NRBC (10*3/UL) BY AUTOMATED COUNT: 0 10*3/UL (ref 0–0.1)
NRBC BLD-RTO: 0 /100 WBCS (ref 0–2)
PLATELET # BLD AUTO: 121 10*3/UL (ref 130–350)
PMV BLD AUTO: 13 FL (ref 6.9–10.8)
POTASSIUM SERPL-SCNC: 4.3 MMOL/L (ref 3.5–5.1)
PROT SERPL-MCNC: 7.5 G/DL (ref 6–8.3)
RBC # BLD AUTO: 5.16 10*6/UL (ref 4.1–5.8)
SODIUM SERPL-SCNC: 140 MMOL/L (ref 135–145)
TRIGL SERPL-MCNC: 145 MG/DL
WBC # BLD AUTO: 5.9 10*3/UL (ref 3.7–9.6)

## 2025-05-22 PROCEDURE — 80053 COMPREHEN METABOLIC PANEL: CPT

## 2025-05-22 PROCEDURE — 86803 HEPATITIS C AB TEST: CPT

## 2025-05-22 PROCEDURE — 36415 COLL VENOUS BLD VENIPUNCTURE: CPT

## 2025-05-22 PROCEDURE — 83036 HEMOGLOBIN GLYCOSYLATED A1C: CPT

## 2025-05-22 PROCEDURE — 85025 COMPLETE CBC W/AUTO DIFF WBC: CPT

## 2025-05-22 PROCEDURE — 80061 LIPID PANEL: CPT

## 2025-05-27 ENCOUNTER — RESULTS FOLLOW-UP (OUTPATIENT)
Dept: FAMILY MEDICINE | Facility: CLINIC | Age: 75
End: 2025-05-27

## 2025-05-27 ENCOUNTER — ANCILLARY PROCEDURE (OUTPATIENT)
Dept: RADIOLOGY | Facility: CLINIC | Age: 75
End: 2025-05-27
Payer: MEDICARE

## 2025-05-27 ENCOUNTER — OFFICE VISIT (OUTPATIENT)
Dept: FAMILY MEDICINE | Facility: CLINIC | Age: 75
End: 2025-05-27
Payer: MEDICARE

## 2025-05-27 VITALS
BODY MASS INDEX: 32.25 KG/M2 | OXYGEN SATURATION: 98 % | WEIGHT: 199.8 LBS | TEMPERATURE: 97.6 F | RESPIRATION RATE: 16 BRPM | SYSTOLIC BLOOD PRESSURE: 136 MMHG | HEART RATE: 67 BPM | DIASTOLIC BLOOD PRESSURE: 78 MMHG

## 2025-05-27 DIAGNOSIS — N40.1 BPH ASSOCIATED WITH NOCTURIA: ICD-10-CM

## 2025-05-27 DIAGNOSIS — G89.29 CHRONIC LEFT HIP PAIN: ICD-10-CM

## 2025-05-27 DIAGNOSIS — M25.552 CHRONIC LEFT HIP PAIN: ICD-10-CM

## 2025-05-27 DIAGNOSIS — I25.10 ATHEROSCLEROSIS OF NATIVE CORONARY ARTERY OF NATIVE HEART WITHOUT ANGINA PECTORIS: ICD-10-CM

## 2025-05-27 DIAGNOSIS — D69.6 THROMBOCYTOPENIA (CMS/HCC): ICD-10-CM

## 2025-05-27 DIAGNOSIS — G47.33 SEVERE OBSTRUCTIVE SLEEP APNEA: ICD-10-CM

## 2025-05-27 DIAGNOSIS — R35.1 BPH ASSOCIATED WITH NOCTURIA: ICD-10-CM

## 2025-05-27 DIAGNOSIS — I25.10 CORONARY ARTERY DISEASE INVOLVING NATIVE CORONARY ARTERY OF NATIVE HEART WITHOUT ANGINA PECTORIS: ICD-10-CM

## 2025-05-27 DIAGNOSIS — Z12.11 COLON CANCER SCREENING: ICD-10-CM

## 2025-05-27 DIAGNOSIS — Z00.00 MEDICARE ANNUAL WELLNESS VISIT, SUBSEQUENT: Primary | ICD-10-CM

## 2025-05-27 PROCEDURE — 73502 X-RAY EXAM HIP UNI 2-3 VIEWS: CPT | Mod: 26,LT | Performed by: RADIOLOGY

## 2025-05-27 PROCEDURE — G2211 COMPLEX E/M VISIT ADD ON: HCPCS | Performed by: FAMILY MEDICINE

## 2025-05-27 PROCEDURE — G0463 HOSPITAL OUTPT CLINIC VISIT: HCPCS | Mod: 25 | Performed by: FAMILY MEDICINE

## 2025-05-27 PROCEDURE — 73502 X-RAY EXAM HIP UNI 2-3 VIEWS: CPT | Mod: LT

## 2025-05-27 PROCEDURE — G0439 PPPS, SUBSEQ VISIT: HCPCS | Performed by: FAMILY MEDICINE

## 2025-05-27 PROCEDURE — 99214 OFFICE O/P EST MOD 30 MIN: CPT | Mod: 25 | Performed by: FAMILY MEDICINE

## 2025-05-27 RX ORDER — RAMIPRIL 2.5 MG/1
2.5 CAPSULE ORAL DAILY
Qty: 90 CAPSULE | Refills: 4 | Status: SHIPPED | OUTPATIENT
Start: 2025-05-27 | End: 2026-05-27

## 2025-05-27 RX ORDER — ATORVASTATIN CALCIUM 20 MG/1
20 TABLET, FILM COATED ORAL DAILY
Qty: 90 TABLET | Refills: 4 | Status: SHIPPED | OUTPATIENT
Start: 2025-05-27 | End: 2026-05-27

## 2025-05-27 RX ORDER — METOPROLOL SUCCINATE 25 MG/1
25 TABLET, EXTENDED RELEASE ORAL DAILY
Qty: 90 TABLET | Refills: 4 | Status: SHIPPED | OUTPATIENT
Start: 2025-05-27 | End: 2026-05-27

## 2025-05-27 SDOH — ECONOMIC STABILITY: HOUSING INSECURITY: AT ANY TIME IN THE PAST 12 MONTHS, WERE YOU HOMELESS OR LIVING IN A SHELTER (INCLUDING NOW)?: NO

## 2025-05-27 SDOH — SOCIAL STABILITY: SOCIAL INSECURITY
WITHIN THE LAST YEAR, HAVE YOU BEEN KICKED, HIT, SLAPPED, OR OTHERWISE PHYSICALLY HURT BY YOUR PARTNER OR EX-PARTNER?: NO

## 2025-05-27 SDOH — HEALTH STABILITY: MENTAL HEALTH: HOW OFTEN DO YOU HAVE SIX OR MORE DRINKS ON ONE OCCASION?: NEVER

## 2025-05-27 SDOH — HEALTH STABILITY: PHYSICAL HEALTH: ON AVERAGE, HOW MANY MINUTES DO YOU ENGAGE IN EXERCISE AT THIS LEVEL?: 20 MIN

## 2025-05-27 SDOH — HEALTH STABILITY: MENTAL HEALTH: HOW OFTEN DO YOU HAVE A DRINK CONTAINING ALCOHOL?: 2-3 TIMES A WEEK

## 2025-05-27 SDOH — ECONOMIC STABILITY: INCOME INSECURITY: IN THE PAST 12 MONTHS HAS THE ELECTRIC, GAS, OIL, OR WATER COMPANY THREATENED TO SHUT OFF SERVICES IN YOUR HOME?: NO

## 2025-05-27 SDOH — HEALTH STABILITY: MENTAL HEALTH: HOW MANY DRINKS CONTAINING ALCOHOL DO YOU HAVE ON A TYPICAL DAY WHEN YOU ARE DRINKING?: 1 OR 2

## 2025-05-27 SDOH — ECONOMIC STABILITY: FOOD INSECURITY: HOW HARD IS IT FOR YOU TO PAY FOR THE VERY BASICS LIKE FOOD, HOUSING, MEDICAL CARE, AND HEATING?: PATIENT DECLINED

## 2025-05-27 SDOH — SOCIAL STABILITY: SOCIAL INSECURITY: WITHIN THE LAST YEAR, HAVE YOU BEEN HUMILIATED OR EMOTIONALLY ABUSED IN OTHER WAYS BY YOUR PARTNER OR EX-PARTNER?: NO

## 2025-05-27 SDOH — SOCIAL STABILITY: SOCIAL NETWORK
DO YOU BELONG TO ANY CLUBS OR ORGANIZATIONS SUCH AS CHURCH GROUPS, UNIONS, FRATERNAL OR ATHLETIC GROUPS, OR SCHOOL GROUPS?: NO

## 2025-05-27 SDOH — SOCIAL STABILITY: SOCIAL INSECURITY: WITHIN THE LAST YEAR, HAVE YOU BEEN AFRAID OF YOUR PARTNER OR EX-PARTNER?: NO

## 2025-05-27 SDOH — ECONOMIC STABILITY: FOOD INSECURITY
WITHIN THE PAST 12 MONTHS, YOU WORRIED THAT YOUR FOOD WOULD RUN OUT BEFORE YOU GOT THE MONEY TO BUY MORE.: PATIENT DECLINED

## 2025-05-27 SDOH — SOCIAL STABILITY: SOCIAL NETWORK: HOW OFTEN DO YOU ATTEND CHURCH OR RELIGIOUS SERVICES?: 1 TO 4 TIMES PER YEAR

## 2025-05-27 SDOH — SOCIAL STABILITY: SOCIAL INSECURITY: ARE YOU MARRIED, WIDOWED, DIVORCED, SEPARATED, NEVER MARRIED, OR LIVING WITH A PARTNER?: LIVING WITH PARTNER

## 2025-05-27 SDOH — ECONOMIC STABILITY: HOUSING INSECURITY: IN THE LAST 12 MONTHS, WAS THERE A TIME WHEN YOU WERE NOT ABLE TO PAY THE MORTGAGE OR RENT ON TIME?: NO

## 2025-05-27 SDOH — SOCIAL STABILITY: SOCIAL INSECURITY
WITHIN THE LAST YEAR, HAVE YOU BEEN RAPED OR FORCED TO HAVE ANY KIND OF SEXUAL ACTIVITY BY YOUR PARTNER OR EX-PARTNER?: NO

## 2025-05-27 SDOH — ECONOMIC STABILITY: FOOD INSECURITY: WITHIN THE PAST 12 MONTHS, THE FOOD YOU BOUGHT JUST DIDN'T LAST AND YOU DIDN'T HAVE MONEY TO GET MORE.: PATIENT DECLINED

## 2025-05-27 SDOH — HEALTH STABILITY: PHYSICAL HEALTH: ON AVERAGE, HOW MANY DAYS PER WEEK DO YOU ENGAGE IN MODERATE TO STRENUOUS EXERCISE (LIKE A BRISK WALK)?: 2 DAYS

## 2025-05-27 SDOH — ECONOMIC STABILITY: TRANSPORTATION INSECURITY: IN THE PAST 12 MONTHS, HAS LACK OF TRANSPORTATION KEPT YOU FROM MEDICAL APPOINTMENTS OR FROM GETTING MEDICATIONS?: NO

## 2025-05-27 SDOH — SOCIAL STABILITY: SOCIAL NETWORK: HOW OFTEN DO YOU ATTEND MEETINGS OF THE CLUBS OR ORGANIZATIONS YOU BELONG TO?: NEVER

## 2025-05-27 SDOH — HEALTH STABILITY: PHYSICAL HEALTH
HOW OFTEN DO YOU NEED TO HAVE SOMEONE HELP YOU WHEN YOU READ INSTRUCTIONS, PAMPHLETS, OR OTHER WRITTEN MATERIAL FROM YOUR DOCTOR OR PHARMACY?: NEVER

## 2025-05-27 SDOH — HEALTH STABILITY: MENTAL HEALTH
DO YOU FEEL STRESS - TENSE, RESTLESS, NERVOUS, OR ANXIOUS, OR UNABLE TO SLEEP AT NIGHT BECAUSE YOUR MIND IS TROUBLED ALL THE TIME - THESE DAYS?: PATIENT DECLINED

## 2025-05-27 SDOH — ECONOMIC STABILITY: HOUSING INSECURITY: IN THE PAST 12 MONTHS, HOW MANY TIMES HAVE YOU MOVED WHERE YOU WERE LIVING?: 1

## 2025-05-27 SDOH — SOCIAL STABILITY: SOCIAL NETWORK: IN A TYPICAL WEEK, HOW MANY TIMES DO YOU TALK ON THE PHONE WITH FAMILY, FRIENDS, OR NEIGHBORS?: PATIENT DECLINED

## 2025-05-27 SDOH — SOCIAL STABILITY: SOCIAL NETWORK: HOW OFTEN DO YOU GET TOGETHER WITH FRIENDS OR RELATIVES?: ONCE A WEEK

## 2025-05-27 ASSESSMENT — PATIENT HEALTH QUESTIONNAIRE - PHQ9
2. FEELING DOWN, DEPRESSED OR HOPELESS: NOT AT ALL
SUM OF ALL RESPONSES TO PHQ9 QUESTIONS 1 & 2: 0
1. LITTLE INTEREST OR PLEASURE IN DOING THINGS: NOT AT ALL

## 2025-05-27 ASSESSMENT — ACTIVITIES OF DAILY LIVING (ADL): LACK_OF_TRANSPORTATION: NO

## 2025-05-27 ASSESSMENT — LIFESTYLE VARIABLES
AUDIT-C TOTAL SCORE: 3
SKIP TO QUESTIONS 9-10: 1

## 2025-05-27 NOTE — PATIENT INSTRUCTIONS
Preventative Care     Patient’s Personalized Health Advice and 5-10 Year Plan:      Physical Activity:   Physical activity can help you maintain a healthy weight, prevent or control illness, reduce stress, and sleep better. It can also help you improve your balance to avoid falls. Try to build up to and maintain a total of 30 minutes of activity each day. If you are able, try walking, doing yard or housework, and taking the stairs more often. You can also strengthen your muscles with exercises done while sitting or lying down.   Emotional Health:   Feeling “down in the dumps” or anxious every now and then is a natural part of life. If this feeling lasts for a few weeks or more, talk with me as soon as possible. It could be a sign of a problem that needs treatment. There are many types of treatment available.   Falls:   You can reduce your risk of falling by making changes in your home. Remove items that may cause tripping, improve lighting, and consider installing grab bars.   Talk with me if you have problems with balance and walking. To prevent falls, you may need your vision, hearing, or blood pressure checked. Exercises to improve your strength and balance, or using a cane or walker, may help.   Review your medicines with me at every visit because some can affect balance. Please be sure to let me know if you fall or are fearful you may fall.   Pain:   We all have aches and pains at times, but chronic pain can change how you feel and live every day. Please talk with me about any symptoms of chronic pain so that we can determine how best to treat.   Sleep:   Getting a good night’s sleep is vital to your health and well-being and can help prevent or manage health problems. Often, sleep can be improved by changing behaviors, including when you go to bed and what you do before bed. Sleep apnea can cause problems such as struggling to stay awake during the day. Please let me know if you would like to learn more about  improving your sleep and/or think you may have sleep apnea.   Home Safety:   You may benefit from a safety check to identify hazards in your home.   Seat Belt:   Please remember to wear a seat belt when driving or riding in a vehicle. It is one of the most important things you can do to stay safe in a car.   Nutrition:   Remember to eat plenty of fruits, vegetables, whole grains, and dairy. Drink at least 64 ounces (8 full glasses) of water a day unless you have been advised to limit fluids.   Alcohol:   Alcohol can have a greater effect on older people, who may feel its effects at a lower amount. Older people should limit alcoholic drinks (no more than one a day for women and no more than two a day for men). Please let me know if alcohol use becomes a problem.   Tobacco:   Not smoking or using other forms of tobacco is one of the most important things you can do for your health.        Additional Support:   Sometimes it can be challenging to manage all aspects of daily life. Finding the right support can help you maintain or improve your health and independence. Please let me know if you would like to talk further about finding resources to assist you.     Advance Directives:   There may come a time when medical decisions need to be made on your behalf. Please talk with your family and with me about your wishes. It is important to provide information about your decisions and any formal advance directives for your medical record.    SCREENINGS:  What Coverage & Frequency Why Previously Done   EKG One-time covered benefit during Welcome To Medicare Physical (IPPE) Check heart rate and rhythm for baseline. Date of Last ECG Order     Last order of ECG 12-LEAD was found on 4/25/2024 from Hospital Encounter on 4/25/2024      Lung Cancer Screening via Low Dos Chest CT Medicare covers annually if history of smoking with no signs or symptoms of lung cancer.  Screening for lung cancer.  Provider will need to advise of the  importance of quitting/avoiding smoking and provide smoking cessation services. Date of Last Chest CT Order     No order of CT CHEST LUNG CANCER LOW DOSE SCREENING is found.       AAA Screen Ultrasound for Abdominal Aortic Aneurysm One-time benefit with Welcome to Medicare Visit for men age 65-75 with history of smoking or positive family history of AAA or has smoked 100 or more cigarettes in his lifetime.   For early detection of abdominal aortic aneurysm. Date of Last AAA US Ordered     No order of US ABDOMINAL AORTA ANEURYSM SCREENING is found.       Osteoporosis Screening Every two years after the age of 65 for those at risk. Person with vertebral abnormalities on x-ray, or have received daily steroid for more than 3 months have increased risk for bone loss. Date of Last Dexa Ordered     No order of DXA BONE DENSITY is found.       Heart Disease Screening  Covers blood tests for all Medicare beneficiaries every 5 years to test for: Cholesterol Levels To check for high cholesterol, which can increase your risk of heart attack, stroke and other problems. Most Recent Cholesterol Results     Lab Results   Component Value Date    CHOL 114 05/22/2025      Lab Results   Component Value Date    HDL 33 (L) 05/22/2025      Lab Results   Component Value Date    LDLCALC 52 05/22/2025      Lab Results   Component Value Date    TRIG 145 05/22/2025      Colon cancer screening Recommend initiating colon cancer screening at age 45. Medicare will reimburse colonoscopy every 10 years and more frequently if at increased risk for developing colon cancer.? Medicare will cover FIT or 3 send home FOBTs annually (age 45 or greater) or Cologuard every 3 years (between the ages of 45-85).  Ordered as a screening to detect colon cancer before there are symptoms, so it can be more effectively treated. Last Colon Screening(s) Ordered      No order of COLOGUARD CANCER SCREENING KIT is found.         Last Referral To General Surgery     Last  "order of AMB REFERRAL TO GENERAL SURGERY was found on 8/31/2024 from Ancillary Orders on 8/30/2024        Last Referral to Gastroenterology     No order of AMB REFERRAL TO GASTROENTEROLOGY is found.      Diabetes screening Once yearly for patients previously tested but not diagnosed with pre-diabetes or patients who were never tested. Twice yearly for patients with pre-diabetes.  To identify diabetes that may not have symptoms. Last Fasting Glucose and A1C     No results found for: \"GLUF\"     Lab Results   Component Value Date    HGBA1C 5.9 05/22/2025      Sexually Transmitted Infection Screening Consider if sexually active and at risk for STI. Chlamydia, gonorrhea and syphilis annually for woman. Syphilis annually for men. To detect infections that may not have symptoms, to prevent complications.     Testing for Hepatitis C Covered Medicare benefit once if born [id number]. Covered annually, regardless of birth year, if considered high risk. To detect hepatitis C infection that may have no symptoms, to prevent complications. Last Hepatitis C Screening Result     Lab Results   Component Value Date    HEPCAB Non-reactive 05/22/2025      Glaucoma Screening  Covered benefit annually if diabetic, family history of glaucoma,  Americans age 50+,  Americans age 65+ Screen for glaucoma and prevent complications.  Date of Last Referral Placed to Ophthalmology     No order of AMB REFERRAL TO OPHTHALMOLOGY is found.        Date of Last Referral Placed to Optometry  No order of AMB REFERRAL TO OPTOMETRY is found.       Prostate Cancer Screening Covers PSA blood test for all male patients 50 and older once every twelve months.   Detect risk for prostate cancer. Date of Last PSA Ordered         Last PSA Result             Health Maintenance:     Health Maintenance Due   Topic Date Due   • Depression Screening  Never done   • Medicare Annual Wellness Visit (AWV)  Never done   • DTaP,Tdap,and Td Vaccines (1 - Tdap) " Never done   • Pneumococcal 50+ (1 of 1 - PCV) Never done   • Abdominal Aortic Aneurysm (AAA) Screen  Never done   • Zoster Vaccines Series (3 of 3) 07/31/2023   • COVID-19 Vaccine (2 - 2024-25 season) 03/20/2025           IMMUNIZATIONS:   Immunizations you have received:   Immunization History   Administered Date(s) Administered   • Flu vaccine Adjuvanted (PF) greater than or equal to 65 years old IM 11/20/2019   • Flu vaccine High-Dose (PF) greater than or equal to 65 years old IM 10/28/2020   • Fluad 10/09/2018   • Zoster SQ 01/30/2013        See grid below for the immunization recommendations.     What Age How Often Why   Flu Vaccine    Age 18 and older Every year Protects against flu virus and its complications.   Pneumonia Vaccine Age 65+  < 65 based on risk factors. Medicare covers an initial pneumonia vaccine and a booster pneumonia vaccine at least one year after the first vaccine. Protects against common types of pneumonia.   Shingles (Zoster) Vaccine Age 50 Not a covered benefit under Medicare.  Once in your lifetime Protects against shingles.   Tetanus Vaccine Age 18 and older Not a covered benefit under Medicare.  Every 10 years (at least one-time Tdap) Protects against tetanus infection.   Hepatitis B Vaccine Age 18 and older based on risk factors Covered if you are at high risk for hepatitis.  Your risk increases if you have End Stage Renal Disease (ESRD), diabetes, living with someone who has Hep B, or if health care worker. Protects against hepatitis, which can cause illness and complications.

## 2025-06-03 ENCOUNTER — DOCUMENTATION (OUTPATIENT)
Dept: FAMILY MEDICINE | Facility: CLINIC | Age: 75
End: 2025-06-03
Payer: MEDICARE

## 2025-06-03 DIAGNOSIS — N52.8 OTHER MALE ERECTILE DYSFUNCTION: Primary | ICD-10-CM

## 2025-06-03 RX ORDER — TADALAFIL 10 MG/1
10 TABLET ORAL DAILY PRN
Qty: 10 TABLET | Refills: 0 | Status: SHIPPED | OUTPATIENT
Start: 2025-06-03 | End: 2025-07-03

## 2025-06-03 NOTE — PROGRESS NOTES
PA denied. Pharmacy notified.         The medication you have requested is not covered by Medicare Part D Law

## (undated) DEVICE — GLOVE SENSICARE SLT 9.0 SURG @

## (undated) DEVICE — GLOVE BIOGEL PI IND 7.0 SURGICAL

## (undated) DEVICE — BAG DECANTER II

## (undated) DEVICE — COVER LIGHT HANDLE ST FLEX 2PK

## (undated) DEVICE — SYRINGE 35CC LUER LOCK TIP STL MONOJECT

## (undated) DEVICE — NEEDLE HYPO 22G 1 1/2" SAFETY MONOJECT MAGELLAN

## (undated) DEVICE — BANDAGE ELASTIC VELCLOSE 2"5YD ACE WRAP

## (undated) DEVICE — DEVICE ELECTROSURGICAL 1450X TRIVERSE PENCIL

## (undated) DEVICE — SUTURE VICRYL 4-0 FS-2 27

## (undated) DEVICE — TUBE SET PNEUMOCLEAR SMOKE EVACUATION HIGH FLOW

## (undated) DEVICE — SOL SOD CHL IRR .9% 250ML BTL USP PLASTIC POUR BOTTLE

## (undated) DEVICE — MANIFOLD 4 PORT NEPTUNE

## (undated) DEVICE — NEEDLE HYPO 18G 1 1/2" SAFETY MONOJECT MAGELLAN

## (undated) DEVICE — STOPCOCK 3 WAY HIGH PRESSURE

## (undated) DEVICE — GAUZE XEROFORM OIL 1X8" STRIP NON-OCCLUSIVE OIL EMULSION

## (undated) DEVICE — SPONGE VISTEC 4X4 STL 10'S

## (undated) DEVICE — DRESSING TEGADERM 2 3/8X2 3/4

## (undated) DEVICE — SOL SOD CHL INJ .9% 1000ML BAG USP VIAFLEX PLASTIC CONTAINER

## (undated) DEVICE — Device

## (undated) DEVICE — DRAPE HAND ORTHO 77"X146" 2" DIAMETER FENSTRATION

## (undated) DEVICE — PREP SKIN CHLORAPREP ORNG 26ML STL

## (undated) DEVICE — ELECTRODE LAP WIRE J-HOOK 36CM CLEANCOAT

## (undated) DEVICE — NEEDLE INSUFFLATION 120M C2201

## (undated) DEVICE — SOL LAC RING INJ 1000ML BAG USP VIAFLEX PLASTIC CONTAINER

## (undated) DEVICE — GOWN SURG IMPERVIOUS XLG ASTOUND SLEEVE

## (undated) DEVICE — CATH CHOLANGIOGRAPHY 5MM PORT INSERT RADIOLUCENT SHEATH

## (undated) DEVICE — GLOVE BIOGEL PI IND SURGICAL SZ 7.5

## (undated) DEVICE — SUTURE MONOCRYL 4-0 PS-2 27" 3/8 CIRCLE NEEDLE REVERSE CUT

## (undated) DEVICE — SYRINGE EAR & ULCER 2 OZ NEONATAL

## (undated) DEVICE — DRAPE STERI SM

## (undated) DEVICE — SYRINGE 10CC LUER LOCK TIP STL

## (undated) DEVICE — GLOVE BIOGEL PI IND 6.5 SURGICAL

## (undated) DEVICE — SUTURE PROLENE 4-0 PS-2

## (undated) DEVICE — GLOVE SENSICARE GREEN 7.5 SURG

## (undated) DEVICE — BANDAGE ELASTIC VELCLOSE 3"5YD ACE WRAP

## (undated) DEVICE — BAG RETRIEVAL 5MM

## (undated) DEVICE — IMMOBILIZER SHOULDER LG STRAP LENGTH 43

## (undated) DEVICE — TROCAR NON BLADED 12 X 100MM OPTICAL

## (undated) DEVICE — BANDAGE ESMARCH 4"X 9' STL @

## (undated) DEVICE — BENZOIN TINCTURE COMPOUND

## (undated) DEVICE — GLOVE SENSICARE SLT 7.5 SURG STL POWDER FREE

## (undated) DEVICE — GUARD NEEDLE MAT DISP FOAM NEEDLE COUNTER

## (undated) DEVICE — CATH IV 20G 1 1/4" SAFETY

## (undated) DEVICE — SUTURE VICRYL 3-0 CT-2

## (undated) DEVICE — PACK BASIC

## (undated) DEVICE — PAD BOVIE ADULT 9' CORD REM ELECTRODE PATIENT RETURN

## (undated) DEVICE — SUTURE VICRYL 0 UR6 27" VIOLET

## (undated) DEVICE — GLOVE SENSICARE SLT 6.5 SURG

## (undated) DEVICE — GLOVE SENSICARE SLT 7.0 SURG

## (undated) DEVICE — SYSTEM FIXATION KII FIOS TROCAR 5X100

## (undated) DEVICE — MARKER SKIN DUAL TIP STL PENSCRIBE W/RULER/9 LABELS  1 PEN 2 TIPS

## (undated) DEVICE — BAG PRESSURE INFUSOR 1000ML MESH BACKING

## (undated) DEVICE — CATH CHOLANGIOGRAPHY KUMAR

## (undated) DEVICE — SPONGE KERLIX SUPER MED 6IN

## (undated) DEVICE — PADDING CAST SPECIALIST 2"X4YD COTTON BANDAGE TEARABLE

## (undated) DEVICE — RETRIEVER SUTURE HEWSON

## (undated) DEVICE — BLANKET WARMING UPPER BODY @ HYPOTHERMIA FORCED AIR

## (undated) DEVICE — GOWN SURGICAL LEVEL 4 W/TOWEL XL AERO CHROME

## (undated) DEVICE — PADDING CAST SPECIALIST 3" COTTON BANDAGE TEARABLE

## (undated) DEVICE — CLOSURE SKIN STERISTRIP 1/2" 3M

## (undated) DEVICE — GLOVE COTTON STERILE

## (undated) DEVICE — SUTURE MONOCRYL 3-0 PS2 18" UNDYED

## (undated) DEVICE — HANDLE SUCTION CONTROL

## (undated) DEVICE — TUBING CLIPVAC DISP USED W CLIPPER VACUUM 0118659

## (undated) DEVICE — DRAPE C-ARM 3M 60X42

## (undated) DEVICE — SPONGE GAUZE 2X2" 8PLY STL

## (undated) DEVICE — TRAY LAP CHOLE CUSTOM SPRH CUSTOM PACK

## (undated) DEVICE — SOL SOD CHL IRR .9% 500ML BTL USP PLASTIC POUR BOTTLE

## (undated) DEVICE — GOWN SURG IMPERVIOUS XXL XLONG SMARTSLEEVE

## (undated) DEVICE — BLADE SCISSOR TIP SWITCH METZ

## (undated) DEVICE — APPLIER CLIP MED/LRG TITANIUM

## (undated) DEVICE — TUBING SUCTION 3/16"X10'

## (undated) DEVICE — GARMENT CALF 18" MED GREEN VASOPRESS

## (undated) DEVICE — DRAPE PLASTIC IMPERVIOUS SPLIT

## (undated) DEVICE — DRAPE MED SURG 3/4 SHEET 60X76 TIBURON STL 60X76